# Patient Record
Sex: MALE | Employment: FULL TIME | URBAN - METROPOLITAN AREA
[De-identification: names, ages, dates, MRNs, and addresses within clinical notes are randomized per-mention and may not be internally consistent; named-entity substitution may affect disease eponyms.]

---

## 2017-01-16 DIAGNOSIS — Z76.0 ENCOUNTER FOR MEDICATION REFILL: Primary | ICD-10-CM

## 2017-01-16 RX ORDER — TRIAMCINOLONE ACETONIDE 1 MG/G
CREAM TOPICAL
Qty: 80 G | Refills: 0 | Status: SHIPPED | OUTPATIENT
Start: 2017-01-16 | End: 2021-07-30

## 2017-02-01 DIAGNOSIS — E78.00 HYPERCHOLESTEREMIA: Primary | ICD-10-CM

## 2017-02-01 DIAGNOSIS — I10 ESSENTIAL HYPERTENSION: ICD-10-CM

## 2017-02-01 DIAGNOSIS — Z80.42 FAMILY HISTORY OF PROSTATE CANCER: ICD-10-CM

## 2017-02-01 DIAGNOSIS — Z11.59 NEED FOR HEPATITIS C SCREENING TEST: ICD-10-CM

## 2017-02-01 PROCEDURE — 86803 HEPATITIS C AB TEST: CPT | Mod: 90 | Performed by: FAMILY MEDICINE

## 2017-02-01 PROCEDURE — 36415 COLL VENOUS BLD VENIPUNCTURE: CPT | Performed by: FAMILY MEDICINE

## 2017-02-01 PROCEDURE — 80061 LIPID PANEL: CPT | Mod: 90 | Performed by: FAMILY MEDICINE

## 2017-02-01 PROCEDURE — 80053 COMPREHEN METABOLIC PANEL: CPT | Mod: 90 | Performed by: FAMILY MEDICINE

## 2017-02-01 PROCEDURE — 84153 ASSAY OF PSA TOTAL: CPT | Mod: 90 | Performed by: FAMILY MEDICINE

## 2017-02-01 NOTE — Clinical Note
[facility] [address] [place]  [address]  Phone: [phone]    February 3, 2017      Aiden Valdez  [address] [facility]    [place] [address]              Dear Aiden,    I am writing to report that your included test results are within expected ranges. I do not suggest that we make any changes at this time.        Sincerely,     Jarret Tay M.D.    Results for orders placed or performed in visit on 02/01/17   HCV Antibody ([facility])   Result Value Ref Range    Hep C Virus Ab <0.1 0.0 - 0.9 s/co ratio    Narrative    Performed at:  01 - [facility] [address], [place], [place]  [id number]  : Tim Cardenas MD, Phone:  [phone]   Comp. Metabolic Panel (14) ([facility])   Result Value Ref Range    Glucose 97 65 - 99 mg/dL    Urea Nitrogen 17 8 - 27 mg/dL    Creatinine 0.94 0.76 - 1.27 mg/dL    eGFR If NonAfricn Am 86 >59 mL/min/1.73    eGFR If Africn Am 99 >59 mL/min/1.73    BUN/Creatinine Ratio 18 10 - 22    Sodium 141 134 - 144 mmol/L    Potassium 5.0 3.5 - 5.2 mmol/L    Chloride 102 96 - 106 mmol/L    Total CO2 25 18 - 28 mmol/L    Calcium 9.4 8.6 - 10.2 mg/dL    Protein Total 7.2 6.0 - 8.5 g/dL    Albumin 4.3 3.6 - 4.8 g/dL    Globulin, Total 2.9 1.5 - 4.5 g/dL    A/G Ratio 1.5 1.1 - 2.5    Bilirubin Total 0.4 0.0 - 1.2 mg/dL    Alkaline Phosphatase 54 39 - 117 IU/L    AST 19 0 - 40 IU/L    ALT 16 0 - 44 IU/L    Narrative    Performed at:  01 - [facility] [address], [place], [place]  [id number]  : Tim Cardenas MD, Phone:  [phone]   Lipid Panel ([facility])   Result Value Ref Range    Cholesterol 210 (H) 100 - 199 mg/dL    Triglycerides 91 0 - 149 mg/dL    HDL Cholesterol 50 >39 mg/dL    VLDL Cholesterol George 18 5 - 40 mg/dL    LDL Cholesterol Calculated 142 (H) 0 - 99 mg/dL    LDL/HDL Ratio 2.8 0.0 - 3.6 ratio units    Narrative    Performed at:  01 - [facility] [address], [place], [place]  [id number]  :  Tim Cardenas MD, Phone:  [phone]   PSA Serum ([facility])   Result Value Ref Range    PSA NG/ML 5.0 (H) 0.0 - 4.0 ng/mL    Narrative    Performed at:  01 - [facility] [address], [place], [place]  [id number]  : Tim Cardenas MD, Phone:  [phone]

## 2017-02-02 LAB
ALBUMIN SERPL-MCNC: 4.3 G/DL (ref 3.6–4.8)
ALBUMIN/GLOB SERPL: 1.5 {RATIO} (ref 1.1–2.5)
ALP SERPL-CCNC: 54 IU/L (ref 39–117)
ALT SERPL-CCNC: 16 IU/L (ref 0–44)
AST SERPL-CCNC: 19 IU/L (ref 0–40)
BILIRUB SERPL-MCNC: 0.4 MG/DL (ref 0–1.2)
BUN SERPL-MCNC: 17 MG/DL (ref 8–27)
BUN/CREATININE RATIO: 18 (ref 10–22)
CALCIUM SERPL-MCNC: 9.4 MG/DL (ref 8.6–10.2)
CHLORIDE SERPLBLD-SCNC: 102 MMOL/L (ref 96–106)
CHOLEST SERPL-MCNC: 210 MG/DL (ref 100–199)
CREAT SERPL-MCNC: 0.94 MG/DL (ref 0.76–1.27)
EGFR IF AFRICN AM: 99 ML/MIN/1.73
EGFR IF NONAFRICN AM: 86 ML/MIN/1.73
GLOBULIN, TOTAL: 2.9 G/DL (ref 1.5–4.5)
GLUCOSE SERPL-MCNC: 97 MG/DL (ref 65–99)
HCV AB SERPL QL IA: <0.1 S/CO RATIO (ref 0–0.9)
HDLC SERPL-MCNC: 50 MG/DL
LDL/HDL RATIO: 2.8 RATIO UNITS (ref 0–3.6)
LDLC SERPL CALC-MCNC: 142 MG/DL (ref 0–99)
POTASSIUM SERPL-SCNC: 5 MMOL/L (ref 3.5–5.2)
PROT SERPL-MCNC: 7.2 G/DL (ref 6–8.5)
PSA NG/ML: 5 NG/ML (ref 0–4)
SODIUM SERPL-SCNC: 141 MMOL/L (ref 134–144)
TOTAL CO2: 25 MMOL/L (ref 18–28)
TRIGL SERPL-MCNC: 91 MG/DL (ref 0–149)
VLDLC SERPL CALC-MCNC: 18 MG/DL (ref 5–40)

## 2017-02-03 NOTE — PROGRESS NOTES
Quick Note:    Dear Aiden,   I am writing to report that your included test results are within expected ranges. I do not suggest that we make any changes at this time.    Jarret Tay M.D.      ______

## 2017-02-06 DIAGNOSIS — E78.00 HYPERCHOLESTEREMIA: Primary | ICD-10-CM

## 2017-02-06 RX ORDER — FENOFIBRATE 145 MG/1
145 TABLET, COATED ORAL DAILY
Qty: 90 TABLET | Refills: 3 | Status: SHIPPED | OUTPATIENT
Start: 2017-02-06 | End: 2018-03-01

## 2017-02-09 ENCOUNTER — OFFICE VISIT (OUTPATIENT)
Dept: FAMILY MEDICINE | Facility: CLINIC | Age: 62
End: 2017-02-09

## 2017-02-09 VITALS
SYSTOLIC BLOOD PRESSURE: 128 MMHG | WEIGHT: 169 LBS | HEART RATE: 68 BPM | HEIGHT: 67 IN | DIASTOLIC BLOOD PRESSURE: 82 MMHG | OXYGEN SATURATION: 98 % | BODY MASS INDEX: 26.53 KG/M2

## 2017-02-09 DIAGNOSIS — Z12.11 SPECIAL SCREENING FOR MALIGNANT NEOPLASMS, COLON: ICD-10-CM

## 2017-02-09 DIAGNOSIS — I10 ESSENTIAL HYPERTENSION: ICD-10-CM

## 2017-02-09 DIAGNOSIS — C61 MALIGNANT NEOPLASM OF PROSTATE (H): ICD-10-CM

## 2017-02-09 DIAGNOSIS — Z00.00 ROUTINE GENERAL MEDICAL EXAMINATION AT A HEALTH CARE FACILITY: Primary | ICD-10-CM

## 2017-02-09 DIAGNOSIS — E78.00 HYPERCHOLESTEREMIA: ICD-10-CM

## 2017-02-09 PROCEDURE — 99396 PREV VISIT EST AGE 40-64: CPT | Performed by: FAMILY MEDICINE

## 2017-02-09 PROCEDURE — 99213 OFFICE O/P EST LOW 20 MIN: CPT | Mod: 25 | Performed by: FAMILY MEDICINE

## 2017-02-09 NOTE — MR AVS SNAPSHOT
After Visit Summary   2/9/2017    Aiden Valdez    MRN: [id number]           Patient Information     Date Of Birth          1955        Visit Information        Provider Department      2/9/2017 2:15 PM Jarret Tay MD [facility]        Today's Diagnoses     Routine general medical examination at a health care facility    -  1     Special screening for malignant neoplasms, colon         Malignant neoplasm of prostate (H)         Essential hypertension         Hypercholesteremia           Care Instructions      Preventive Health Recommendations  Male Ages 50 - 64    Yearly exam:             See your health care provider every year in order to  o   Review health changes.   o   Discuss preventive care.    o   Review your medicines if your doctor has prescribed any.     Have a cholesterol test every 5 years, or more frequently if you are at risk for high cholesterol/heart disease.     Have a diabetes test (fasting glucose) every three years. If you are at risk for diabetes, you should have this test more often.     Have a colonoscopy at age 50, or have a yearly FIT test (stool test). These exams will check for colon cancer.      Talk with your health care provider about whether or not a prostate cancer screening test (PSA) is right for you.    You should be tested each year for STDs (sexually transmitted diseases), if you re at risk.     Shots: Get a flu shot each year. Get a tetanus shot every 10 years.     Nutrition:    Eat at least 5 servings of fruits and vegetables daily.     Eat whole-grain bread, whole-wheat pasta and brown rice instead of white grains and rice.     Talk to your provider about Calcium and Vitamin D.     Lifestyle    Exercise for at least 150 minutes a week (30 minutes a day, 5 days a week). This will help you control your weight and prevent disease.     Limit alcohol to one drink per day.     No smoking.     Wear sunscreen to prevent skin cancer.     See  "your dentist every six months for an exam and cleaning.     See your eye doctor every 1 to 2 years.            Follow-ups after your visit        Additional Services     GASTROENTEROLOGY ADULT REF CONSULT ONLY       Preferred Location: [facility] [phone]  Screening colonoscopy    Please be aware that coverage of these services is subject to the terms and limitations of your health insurance plan.  Call member services at your health plan with any benefit or coverage questions.  Any procedures must be performed at a [place] facility OR coordinated by your clinic's referral office.    Please bring the following with you to your appointment:    (1) Any X-Rays, CTs or MRIs which have been performed.  Contact the facility where they were done to arrange for  prior to your scheduled appointment.    (2) List of current medications   (3) This referral request   (4) Any documents/labs given to you for this referral                  Who to contact     If you have questions or need follow up information about today's clinic visit or your schedule please contact [facility] directly at [phone].  Normal or non-critical lab and imaging results will be communicated to you by [employer], letter or phone within 4 business days after the clinic has received the results. If you do not hear from us within 7 days, please contact the clinic through [employer] or phone. If you have a critical or abnormal lab result, we will notify you by phone as soon as possible.  Submit refill requests through [facility] or call your pharmacy and they will forward the refill request to us. Please allow 3 business days for your refill to be completed.          Additional Information About Your Visit        [facility] Information     [facility] lets you send messages to your doctor, view your test results, renew your prescriptions, schedule appointments and more. To sign up, go to [email or web address] . Click on \"Log in\" on the left side " "of the screen, which will take you to the Welcome page. Then click on \"Sign up Now\" on the right side of the page.     You will be asked to enter the access code listed below, as well as some personal information. Please follow the directions to create your username and password.     Your access code is: [email or web address]  Expires: 5/10/2017  3:31 PM     Your access code will  in 90 days. If you need help or a new code, please call your [facility] or [phone].        Care EveryWhere ID     This is your Care EveryWhere ID. This could be used by other organizations to access your [place] medical records  [id number]        Your Vitals Were     Pulse Height BMI (Body Mass Index) Pulse Oximetry          68 1.695 m (5' 6.75\") 26.68 kg/m2 98%         Blood Pressure from Last 3 Encounters:   17 128/82   10/06/16 138/88   16 132/80    Weight from Last 3 Encounters:   17 76.658 kg (169 lb)   10/06/16 76.204 kg (168 lb)   16 76.749 kg (169 lb 3.2 oz)              We Performed the Following     GASTROENTEROLOGY ADULT REF CONSULT ONLY        Primary Care Provider Office Phone # Fax #    Jarret Tay [phone]       [facility] [address] [place] [address]        Thank you!     Thank you for choosing [facility]  for your care. Our goal is always to provide you with excellent care. Hearing back from our patients is one way we can continue to improve our services. Please take a few minutes to complete the written survey that you may receive in the mail after your visit with us. Thank you!             Your Updated Medication List - Protect others around you: Learn how to safely use, store and throw away your medicines at www.disposemymeds.org.          This list is accurate as of: 17  3:31 PM.  Always use your most recent med list.                   Brand Name Dispense Instructions for use    ACIDOPHILUS PROBIOTIC 100 MG Caps     " 60 capsule    Take 100 mg by mouth 2 times daily       fenofibrate 145 MG tablet     90 tablet    Take 1 tablet (145 mg) by mouth daily       FEXOFENADINE HCL PO      Take 180 mg by mouth daily       fluconazole 150 MG tablet    DIFLUCAN    10 tablet    1 tablet PO every other day       hydrocortisone 2.5 % cream          hydrocortisone 2.5 % cream    PROCTOSOL HC    28.35 g    place rectally twice daily as directed.       lisinopril 10 MG tablet    PRINIVIL/ZESTRIL    30 tablet    Take 1 tablet (10 mg) by mouth daily       triamcinolone 0.1 % cream    KENALOG    80 g    Apply sparingly to affected area three times daily as needed

## 2017-02-09 NOTE — PROGRESS NOTES
SUBJECTIVE:     CC: Aiden Valdez is an 61 year old male who presents for preventative health visit.     Healthy Habits:    Do you get at least three servings of calcium containing foods daily (dairy, green leafy vegetables, etc.)? yes    Amount of exercise or daily activities, outside of work: 6 day(s) per week    Problems taking medications regularly No    Medication side effects: Yes, muscle cramping. Fenofibrate??    Have you had an eye exam in the past two years? yes    Do you see a dentist twice per year? yes    Do you have sleep apnea, excessive snoring or daytime drowsiness?no        Questions about Fenofibrate and muscle cramping    Today's PHQ-2 Score:   PHQ-2 ( 1999 [employer]) 1/27/2016 1/6/2014   Q1: Little interest or pleasure in doing things 0 0   Q2: Feeling down, depressed or hopeless 0 0   PHQ-2 Score 0 0       Abuse: Current or Past(Physical, Sexual or Emotional)- No  Do you feel safe in your environment - Yes    Social History   Substance Use Topics     Smoking status: Never Smoker      Smokeless tobacco: Never Used     Alcohol Use: Yes     The patient does not drink >3 drinks per day nor >7 drinks per week.    Last PSA: No results found for: PSA    Recent Labs   Lab Test  02/01/17   0910  02/29/16   0751   CHOL  210*  237*   HDL  50  56   LDL  142*  161*   TRIG  91  100       Reviewed orders with patient. Reviewed health maintenance and updated orders accordingly - Yes    All Histories reviewed and updated in Epic.  Past Medical History   Diagnosis Date     AR (allergic rhinitis)      Periocular dermatitides      Hypercholesteremia      HTN (hypertension)       Past Surgical History   Procedure Laterality Date     Extracapsular cataract extration with intraocular lens implant       Prostate biopsy, needle, saturation sampling         ROS:  C: NEGATIVE for fever, chills, change in weight  I: NEGATIVE for worrisome rashes, moles or lesions  E: NEGATIVE for vision changes or irritation  ENT: NEGATIVE  for ear, mouth and throat problems  R: NEGATIVE for significant cough or SOB  CV: NEGATIVE for chest pain, palpitations or peripheral edema  GI: NEGATIVE for nausea, abdominal pain, heartburn, or change in bowel habits   male: negative for dysuria, hematuria, decreased urinary stream, erectile dysfunction, urethral discharge  M: NEGATIVE for significant arthralgias or myalgia  N: NEGATIVE for weakness, dizziness or paresthesias  P: NEGATIVE for changes in mood or affect    BP Readings from Last 3 Encounters:   02/09/17 128/82   10/06/16 138/88   01/27/16 132/80    Wt Readings from Last 3 Encounters:   02/09/17 76.658 kg (169 lb)   10/06/16 76.204 kg (168 lb)   01/27/16 76.749 kg (169 lb 3.2 oz)                  OBJECTIVE:     There were no vitals taken for this visit.  EXAM:  GENERAL: healthy, alert and no distress  EYES: Eyes grossly normal to inspection, PERRL and conjunctivae and sclerae normal  HENT: ear canals and TM's normal, nose and mouth without ulcers or lesions  NECK: no adenopathy, no asymmetry, masses, or scars and thyroid normal to palpation  RESP: lungs clear to auscultation - no rales, rhonchi or wheezes  BREAST: normal without masses, tenderness or nipple discharge and no palpable axillary masses or adenopathy  CV: regular rate and rhythm, normal S1 S2, no S3 or S4, no murmur, click or rub, no peripheral edema and peripheral pulses strong  ABDOMEN: soft, nontender, no hepatosplenomegaly, no masses and bowel sounds normal  RECTAL: normal sphincter tone, no rectal masses, prostate normal size, smooth, nontender without nodules or masses  MS: no gross musculoskeletal defects noted, no edema  SKIN: no suspicious lesions or rashes  NEURO: Normal strength and tone, mentation intact and speech normal  PSYCH: mentation appears normal, affect normal/bright    ASSESSMENT/PLAN:     Aiden was seen today for physical and hearing screening.    Diagnoses and all orders for this visit:    Routine general medical  "examination at a health care facility  -     GASTROENTEROLOGY ADULT REF CONSULT ONLY    Special screening for malignant neoplasms, colon  -     GASTROENTEROLOGY ADULT REF CONSULT ONLY    Other orders  -     Cancel: GASTROENTEROLOGY ADULT REF PROCEDURE ONLY        COUNSELING:  Reviewed preventive health counseling, as reflected in patient instructions       Regular exercise       Colon cancer screening         reports that he has never smoked. He has never used smokeless tobacco.    Estimated body mass index is 26.71 kg/(m^2) as calculated from the following:    Height as of 1/27/16: 1.689 m (5' 6.5\").    Weight as of 10/6/16: 76.204 kg (168 lb).       Counseling Resources:  ATP IV Guidelines  Pooled Cohorts Equation Calculator  FRAX Risk Assessment  ICSI Preventive Guidelines  Dietary Guidelines for Americans, 2010  USDA's MyPlate  ASA Prophylaxis  Lung CA Screening    Jarret Tay MD  [facility]  "

## 2017-02-09 NOTE — PROGRESS NOTES
Problem(s) Oriented visit    Besides the Wellness Exam he is here to discuss the status of the following problem(s)  Subjective:  1. Hypercholesteremia  Has history of hyperlipidemia.  On med for this, denies any significant side effects of this medication.      Latest labs reviewed:    Recent Labs   Lab Test  02/01/17   0910  02/29/16   0751   CHOL  210*  237*   HDL  50  56   LDL  142*  161*   TRIG  91  100        AST       19   2/1/2017     2.  Essential hypertension  Blood presure remains well controlled when checked out of clinic.    Reviewed last 6 BP readings in chart:  BP Readings from Last 6 Encounters:   02/09/17 128/82   10/06/16 138/88   01/27/16 132/80   02/17/15 140/86   02/05/15 118/66   01/22/15 142/82       he has not experienced any significant side effects from medications for hypertension.    NO active cardiac complaints or symptoms with exercise.    3. Malignant neoplasm of prostate (H)  Found on biopsy with Dr. Ray, wants us to order next MRI in approx 9 monts    ROS:  General and CV completed and negative except as noted above     HISTORY:   reports that he has never smoked. He has never used smokeless tobacco.    EXAM:  BP: 128/82   Pulse: 68    Temp: Data Unavailable    Wt Readings from Last 2 Encounters:   02/09/17 76.658 kg (169 lb)   10/06/16 76.204 kg (168 lb)       BMI= Body mass index is 26.68 kg/(m^2).    EXAM:  APPEARANCE: = Relaxed and in no distress      Assessment/Plan:  Malignant neoplasm of prostate (H)  Will check MRI in November  Essential hypertension  Discussed hypertension in detail including JNC VIII guidelines for blood pressure goals.  Discussed indication for treatment and treatment options.  Discussed the importance for aggressive management of HTN to prevent vascular complications later.  Recommended lower fat, lower carbohydrate, and lower sodium (<2000 mg)diet.  Discussed required intervals for follow up on HTN, lab studies, and the need to aggresive management of  other cardiac disease risk factors.  Recommened pt. follow their blood pressures outside the clinic to ensure that BPs are remaining within guidelines, and to contact me if the readings are not within guidelines so we can adjust treatment as needed.    Hypercholesteremia  Discussed current lipid results, previous results (if available) current guidelines (NCEP) for treatment and goals for lipids.  Discussed lifestyle modification, dietary changes (low fat, low simple carb) and regular aerobic exercise.  Discussed the link between dysmetabolic syndrome and impaired glucose tolerance seen in certain patterns of lipids.  Briefly discussed medication used for lipid lowering, including the statins are their possible side effects of myalgias, rhabdomyolysis, and liver toxicity.    Refill medicaion    Reviewed patients plan of care and provided an AVS.   Jarret Tay  [facility]  [phone]   For any issues my office # is [phone]

## 2017-03-08 DIAGNOSIS — Z76.0 ENCOUNTER FOR MEDICATION REFILL: ICD-10-CM

## 2017-03-09 RX ORDER — LISINOPRIL 10 MG/1
10 TABLET ORAL DAILY
Qty: 90 TABLET | Refills: 3 | Status: ON HOLD | OUTPATIENT
Start: 2017-03-09 | End: 2017-05-19

## 2017-05-08 ENCOUNTER — OFFICE VISIT (OUTPATIENT)
Dept: FAMILY MEDICINE | Facility: CLINIC | Age: 62
End: 2017-05-08

## 2017-05-08 VITALS
OXYGEN SATURATION: 95 % | SYSTOLIC BLOOD PRESSURE: 138 MMHG | HEART RATE: 94 BPM | BODY MASS INDEX: 26.67 KG/M2 | DIASTOLIC BLOOD PRESSURE: 88 MMHG | WEIGHT: 169 LBS

## 2017-05-08 DIAGNOSIS — T78.40XD ALLERGIC REACTION, SUBSEQUENT ENCOUNTER: ICD-10-CM

## 2017-05-08 DIAGNOSIS — J01.01 ACUTE RECURRENT MAXILLARY SINUSITIS: Primary | ICD-10-CM

## 2017-05-08 PROCEDURE — 99214 OFFICE O/P EST MOD 30 MIN: CPT | Performed by: FAMILY MEDICINE

## 2017-05-08 RX ORDER — FLUTICASONE PROPIONATE 50 MCG
1-2 SPRAY, SUSPENSION (ML) NASAL DAILY
Qty: 1 BOTTLE | Refills: 11 | Status: ON HOLD | OUTPATIENT
Start: 2017-05-08 | End: 2017-05-18

## 2017-05-08 NOTE — PROGRESS NOTES
SUBJECTIVE:  Aiden Valdez is a 61 year old male who complains of headaches for 4 days. Description of pain: dull pain, bilateral in the occipital area. Duration of individual headaches: all days, frequency daily. Associated symptoms: photophobia. Pain relief: OTC NSAID's. Precipitating factors: stress. He denies a history of recent head injury.   Prior neurological history: negative for migraine headaches, stroke, brain neoplasms, multiple sclerosis, meningitis, major head injuries, neurosurgical procedures.  Neurologic Review of Systems - no TIA or stroke-like symptoms.    Current Outpatient Prescriptions   Medication Sig Dispense Refill     Naphazoline-Pheniramine (OPCON-A OP)        lisinopril (PRINIVIL/ZESTRIL) 10 MG tablet Take 1 tablet (10 mg) by mouth daily 90 tablet 3     fenofibrate 145 MG tablet Take 1 tablet (145 mg) by mouth daily 90 tablet 3     triamcinolone (KENALOG) 0.1 % cream Apply sparingly to affected area three times daily as needed 80 g 0     FEXOFENADINE HCL PO Take 180 mg by mouth daily       hydrocortisone (PROCTOSOL HC) 2.5 % rectal cream place rectally twice daily as directed. 28.35 g 0     Lactobacillus (ACIDOPHILUS PROBIOTIC) 100 MG CAPS Take 100 mg by mouth 2 times daily 60 capsule 1     hydrocortisone 2.5 % cream          OBJECTIVE:  Appearance: healthy, alert and cooperative.  Neurological Exam: normal without focal findings, mental status, speech normal, alert and oriented x iii, TREVER, reflexes normal and symmetric.    Assessment/Plan:  Aiden was seen today for headache.    Diagnoses and all orders for this visit:    Acute recurrent maxillary sinusitis  -     amoxicillin-clavulanate (AUGMENTIN) 875-125 MG per tablet; Take 1 tablet by mouth 2 times daily  -     fluticasone (FLONASE) 50 MCG/ACT spray; Spray 1-2 sprays into both nostrils daily    Allergic reaction, subsequent encounter  -     fluticasone (FLONASE) 50 MCG/ACT spray; Spray 1-2 sprays into both nostrils  daily    Recommendations: lie in darkened room and apply cold packs prn for pain, side effect profile discussed in detail, asked to keep headache diary and patient reassured that neurodiagnostic workup not indicated from benign H&P.  [facility].Jarret Tay MD  [facility]  [phone]

## 2017-05-08 NOTE — MR AVS SNAPSHOT
"              After Visit Summary   2017    Aiden Valdez    MRN: [id number]           Patient Information     Date Of Birth          1955        Visit Information        Provider Department      2017 2:15 PM Jarret Tay MD [facility]        Today's Diagnoses     Acute recurrent maxillary sinusitis    -  1    Allergic reaction, subsequent encounter           Follow-ups after your visit        Who to contact     If you have questions or need follow up information about today's clinic visit or your schedule please contact [facility] directly at [phone].  Normal or non-critical lab and imaging results will be communicated to you by [employer], letter or phone within 4 business days after the clinic has received the results. If you do not hear from us within 7 days, please contact the clinic through [employer] or phone. If you have a critical or abnormal lab result, we will notify you by phone as soon as possible.  Submit refill requests through [employer] or call your pharmacy and they will forward the refill request to us. Please allow 3 business days for your refill to be completed.          Additional Information About Your Visit        MyChart Information     Nevro lets you send messages to your doctor, view your test results, renew your prescriptions, schedule appointments and more. To sign up, go to [email or web address] . Click on \"Log in\" on the left side of the screen, which will take you to the Welcome page. Then click on \"Sign up Now\" on the right side of the page.     You will be asked to enter the access code listed below, as well as some personal information. Please follow the directions to create your username and password.     Your access code is: [email or web address]  Expires: 5/10/2017  4:31 PM     Your access code will  in 90 days. If you need help or a new code, please call your [place] clinic or [phone].        Care EveryWhere ID     This is your " Care EveryWhere ID. This could be used by other organizations to access your [place] medical records  [id number]        Your Vitals Were     Pulse Pulse Oximetry BMI (Body Mass Index)             94 95% 26.67 kg/m2          Blood Pressure from Last 3 Encounters:   05/08/17 138/88   02/09/17 128/82   10/06/16 138/88    Weight from Last 3 Encounters:   05/08/17 76.7 kg (169 lb)   02/09/17 76.7 kg (169 lb)   10/06/16 76.2 kg (168 lb)              Today, you had the following     No orders found for display         Today's Medication Changes          These changes are accurate as of: 5/8/17  2:48 PM.  If you have any questions, ask your nurse or doctor.               Start taking these medicines.        Dose/Directions    amoxicillin-clavulanate 875-125 MG per tablet   Commonly known as:  AUGMENTIN   Used for:  Acute recurrent maxillary sinusitis   Started by:  Jarret Tay MD        Dose:  1 tablet   Take 1 tablet by mouth 2 times daily   Quantity:  20 tablet   Refills:  0       fluticasone 50 MCG/ACT spray   Commonly known as:  FLONASE   Used for:  Acute recurrent maxillary sinusitis, Allergic reaction, subsequent encounter   Started by:  Jarret Tay MD        Dose:  1-2 spray   Spray 1-2 sprays into both nostrils daily   Quantity:  1 Bottle   Refills:  11            Where to get your medicines      These medications were sent to [place] [address] [place] [facility] [address]  [address], [place] [address]     Phone:  [phone]     amoxicillin-clavulanate 875-125 MG per tablet    fluticasone 50 MCG/ACT spray                Primary Care Provider Office Phone # Fax #    Jarret Tay [phone]       [facility] [address] [place] [address]        Thank you!     Thank you for choosing [facility]  for your care. Our goal is always to provide you with excellent care. Hearing back from our patients is one way we can  continue to improve our services. Please take a few minutes to complete the written survey that you may receive in the mail after your visit with us. Thank you!             Your Updated Medication List - Protect others around you: Learn how to safely use, store and throw away your medicines at www.disposemymeds.org.          This list is accurate as of: 5/8/17  2:48 PM.  Always use your most recent med list.                   Brand Name Dispense Instructions for use    ACIDOPHILUS PROBIOTIC 100 MG Caps     60 capsule    Take 100 mg by mouth 2 times daily       amoxicillin-clavulanate 875-125 MG per tablet    AUGMENTIN    20 tablet    Take 1 tablet by mouth 2 times daily       fenofibrate 145 MG tablet     90 tablet    Take 1 tablet (145 mg) by mouth daily       FEXOFENADINE HCL PO      Take 180 mg by mouth daily       fluticasone 50 MCG/ACT spray    FLONASE    1 Bottle    Spray 1-2 sprays into both nostrils daily       hydrocortisone 2.5 % cream          hydrocortisone 2.5 % cream    PROCTOSOL HC    28.35 g    place rectally twice daily as directed.       lisinopril 10 MG tablet    PRINIVIL/ZESTRIL    90 tablet    Take 1 tablet (10 mg) by mouth daily       OPCON-A OP          triamcinolone 0.1 % cream    KENALOG    80 g    Apply sparingly to affected area three times daily as needed

## 2017-05-11 ENCOUNTER — OFFICE VISIT (OUTPATIENT)
Dept: FAMILY MEDICINE | Facility: CLINIC | Age: 62
End: 2017-05-11

## 2017-05-11 ENCOUNTER — TRANSFERRED RECORDS (OUTPATIENT)
Dept: FAMILY MEDICINE | Facility: CLINIC | Age: 62
End: 2017-05-11

## 2017-05-11 VITALS
HEART RATE: 79 BPM | OXYGEN SATURATION: 98 % | RESPIRATION RATE: 16 BRPM | BODY MASS INDEX: 26.48 KG/M2 | WEIGHT: 167.8 LBS | SYSTOLIC BLOOD PRESSURE: 136 MMHG | DIASTOLIC BLOOD PRESSURE: 82 MMHG | TEMPERATURE: 98.6 F

## 2017-05-11 DIAGNOSIS — R51.9 HEADACHE, UNSPECIFIED HEADACHE TYPE: Primary | ICD-10-CM

## 2017-05-11 DIAGNOSIS — I10 ESSENTIAL HYPERTENSION: ICD-10-CM

## 2017-05-11 PROCEDURE — 93000 ELECTROCARDIOGRAM COMPLETE: CPT | Performed by: FAMILY MEDICINE

## 2017-05-11 PROCEDURE — 99214 OFFICE O/P EST MOD 30 MIN: CPT | Performed by: FAMILY MEDICINE

## 2017-05-11 RX ORDER — OXYCODONE HYDROCHLORIDE 5 MG/1
5 TABLET ORAL EVERY 4 HOURS PRN
Qty: 18 TABLET | Refills: 0 | Status: ON HOLD | OUTPATIENT
Start: 2017-05-11 | End: 2017-05-18

## 2017-05-11 RX ORDER — METHYLPREDNISOLONE 4 MG
TABLET, DOSE PACK ORAL
Qty: 21 TABLET | Refills: 0 | Status: ON HOLD | OUTPATIENT
Start: 2017-05-11 | End: 2017-05-18

## 2017-05-11 NOTE — MR AVS SNAPSHOT
"              After Visit Summary   5/11/2017    Aiden Valdez    MRN: [id number]           Patient Information     Date Of Birth          1955        Visit Information        Provider Department      5/11/2017 1:45 PM Dutch Corey MD [facility]        Today's Diagnoses     Headache, unspecified headache type    -  1    Essential hypertension          Care Instructions      * Tension Headache    Muscle Tension Headache (also called \"stress headache\") is a very common cause of head pain. Under stress, some people tense the muscles of their shoulder, neck and scalp without knowing it. If this lasts long enough, a headache can occur. These headaches can be very painful and last for hours or even days.  Home Care:    If you were given pain medicine for this headache, do not drive yourself home. Arrange for a ride, instead. When you get home, try to sleep. You should feel much better when you wake up.    Heat to the back of your neck may relieve neck spasm.    Drink only clear liquids or eat a very light diet to avoid nausea/vomiting until symptoms improve.  Preventing Future Headaches    Identify the sources of stress in your life. These may not be obvious! Learn new ways to handle your stress, such as regular exercise, biofeedback, self-hypnosis and meditation. For more information about this, consult your doctor or go to a local bookstore and review the many books and tapes on this subject.    At the first sign of a tension headache, take time out if possible. Remove yourself from the stressful situation, find a quiet comfortable place to sit or lie down and let yourself relax. Heat and deep massage of the tight areas in the neck and shoulders may help reduce muscle spasm. Medicine, such as ibuprofen (Advil or Motrin) or a prescribed muscle relaxant may be helpful at this point.  Follow Up with your doctor if the headache is not better within the next 24 hours. If you have frequent headaches you " should discuss a treatment plan with your primary care doctor. Ask if you can have medicine to take at home the next time you get a bad headache. This may avoid the need for a visit to the emergency department in the future. Poorly controlled chronic headaches may require a referral to a neurologist (headache specialist).  Call your Doctor Or Get Prompt Medical Attention if any of the following occur:    Worsening of your head pain or no improvement within 24 hours    Repeated vomiting (unable to keep liquids down)    Fever of 100.4 F (38.0 C) or higher, or as directed by your healthcare provider    Stiff neck    Extreme drowsiness, confusion or fainting    Dizziness, vertigo (dizziness with spinning sensation)    Weakness of an arm or leg or one side of the face    Difficulty with speech or vision    2178-7682 [facility], [address] [place] [address]. All rights reserved. This information is not intended as a substitute for professional medical care. Always follow your healthcare professional's instructions.        * Migraine Headache  Migraine headaches are related to changes in blood flow to the brain. This causes throbbing or constant pain on one or both sides of the head. The pain may last from a few hours to several days. There is usually nausea, vomiting, sensitivity to light and sound, and blurred vision. A migraine attack may be triggered by emotional stress, hormone changes during the menstrual cycle, oral contraceptives, alcohol use, certain foods containing tyramine, eye strain, weather changes, missing meals, or too little or too much sleep.  Home Care For This Headache:  1) If you were given pain medicine for this headache, do not drive yourself home . Arrange for a ride, instead. When you get home, try to sleep. You should feel much better when you wake up.  2) Migraine headaches may improve with an ice pack on the forehead or at the base of the skull. Heat to the back of your neck  may relieve any neck spasm.  3) Drink only clear liquids or eat a very light diet to avoid nausea/vomiting until symptoms improve.  Preventing Future Headaches:  1) Pay attention to those factors that seem to trigger your headache. Try to avoid them when you can. If you have frequent headaches, it is useful to keep a diary of what you were doing, feeling or eating in the hours before each attack. Show this to your doctor to help find the cause of your headaches.  a) If you feel that stress is a factor in your headaches, look at the sources of stress in your life. Find ways to release the build-up of those stresses by using regular exercise, relaxation methods (yoga, meditation), bio-feedback or simply taking time-out for yourself. For more information about this, consult your doctor or go to a local bookstore and review books and tapes on this subject.  b) Tyramine is a substance present in the following foods : chocolate, yogurt, all cheeses except cottage cheese and cream cheese. smoked or pickled fish and meat (including herring, caviar, bologna, pepperoni, salami), liver, avocados, bananas, figs, raisins, and red wine. Be aware that these foods may trigger a migraine in some persons. Try taking these foods out of your diet for 1-2 months to see if this reduces headache frequency.  Treating Future Attacks:  1) At the first sign of a migraine headache, take a medicine to stop it if one has been prescribed for you. If not, take acetaminophen (Tylenol) or ibuprofen (Motrin, Advil) if you are able to take these. The sooner you take medicine, the better it will work.  2) You may also want to find a quiet, dark, comfortable place to sit or lie down. Let yourself relax or sleep.  3) An ice pack on the forehead or area of greatest pain may also help.  Follow Up  with your doctor if the headache is not better within the next 24 hours. If you have frequent headaches you should discuss a treatment plan with your primary  care doctor. Ask if you can have medicine to take at home the next time you get a bad headache. Poorly controlled chronic headaches may require a referral to a neurologist (headache specialist).  Get Prompt Medical Attention  if any of the following occur:    Your head pain gets worse, or does not improve within 24 hours    Repeated vomiting (can t keep liquids down)    Sinus or ear or throat pain (not already reported)    Fever of 101  F (38.3  C) or higher, or as directed by your healthcare provider    Stiff neck    Extreme drowsiness, confusion or fainting    Weakness of an arm or leg or one side of the face    Difficulty with speech or vision    7639-1376 [employer]. [address] [place] [address], [place] [address]. All rights reserved. This information is not intended as a substitute for professional medical care. Always follow your healthcare professional's instructions.        Self-Care for Headaches  Most headaches aren't serious and can be relieved with self-care. But some headaches may be a sign of another health problem like eye trouble or high blood pressure. To find the best treatment, learn what kind of headaches you get. For tension headaches, self-care will usually help. To treat migraines, ask your healthcare provider for advice. It is also possible to get both tension and migraine headaches. Self-care involves relieving the pain and avoiding headache  triggers  if you can.    Ways to reduce pain and tension  Try these steps:    Apply a cold compress or ice pack to the pain site.    Drink fluids. If nausea makes it hard to drink, try sucking on ice.    Rest. Protect yourself from bright light and loud noises.    Calm your emotions by imagining a peaceful scene.    Massage tight neck, shoulder, and head muscles.    To relax muscles, soak in a hot bath or use a hot shower.  Use medicines  Aspirin or aspirin substitutes, such as ibuprofen and acetaminophen, can relieve headache. Remember: Never  "give aspirin to anyone 18 years old or younger because of the risk of developing Reye syndrome. Use pain medicines only when necessary.  Track your headaches  Keeping a headache diary can help you and your healthcare provider identify what's causing your headaches:    Note when each headache happens.    Identify your activities and the foods you've eaten 6 to 8 hours before the headache began.    Look for any trends or \"triggers.\"  Signs of tension headache  Any of the following can be signs:    Dull pain or feeling of pressure in a tight band around your head    Pain in your neck or shoulders    Headache without a definite beginning or end    Headache after an activity such as driving or working on a computer  Signs of migraine  Any of the following can be signs:    Throbbing pain on one or both sides of your head    Nausea or vomiting    Extreme sensitivity to light, sound, and smells    Bright spots, flashes, or other visual changes    Pain or nausea so severe that you can't continue your daily activities  Call your healthcare provider   If you have any of the following symptoms, contact your healthcare provider:    A headache that lingers after a recent injury or bump to the head.    A fever with a stiff neck or pain when you bend your head toward your chest.    A headache along with slurred speech, changes in your vision, or numbness or weakness in your arms or legs.    A headache for longer than 3 days.    Frequent headaches, especially in the morning.    Headaches with seizures     Seek immediate medical attention if you have a headache that you would call \"the worst headache you have ever had.\"     2331-8643 [employer]. [address] [place] [address]. All rights reserved. This information is not intended as a substitute for professional medical care. Always follow your healthcare professional's instructions.              Follow-ups after your visit        Additional Services     " "[facility]       Referral to [facility]  Phone: [phone]  Fax: [phone]  Reason for referral: CT scan of brain, CT scan of sinuses .  Severe headache for 5 days                  Who to contact     If you have questions or need follow up information about today's clinic visit or your schedule please contact [facility] directly at [phone].  Normal or non-critical lab and imaging results will be communicated to you by [employer], letter or phone within 4 business days after the clinic has received the results. If you do not hear from us within 7 days, please contact the clinic through [employer] or phone. If you have a critical or abnormal lab result, we will notify you by phone as soon as possible.  Submit refill requests through [employer] or call your pharmacy and they will forward the refill request to us. Please allow 3 business days for your refill to be completed.          Additional Information About Your Visit        [employer]ly Information     [employer] lets you send messages to your doctor, view your test results, renew your prescriptions, schedule appointments and more. To sign up, go to [email or web address] . Click on \"Log in\" on the left side of the screen, which will take you to the Welcome page. Then click on \"Sign up Now\" on the right side of the page.     You will be asked to enter the access code listed below, as well as some personal information. Please follow the directions to create your username and password.     Your access code is: [email or web address]  Expires: 2017  2:14 PM     Your access code will  in 90 days. If you need help or a new code, please call your [place] clinic or [phone].        Care EveryWhere ID     This is your Care EveryWhere ID. This could be used by other organizations to access your [place] medical records  [id number]        Your Vitals Were     Pulse Temperature Respirations Pulse Oximetry BMI (Body Mass Index)       79 " 98.6  F (37  C) (Oral) 16 98% 26.48 kg/m2        Blood Pressure from Last 3 Encounters:   05/11/17 136/82   05/08/17 138/88   02/09/17 128/82    Weight from Last 3 Encounters:   05/11/17 76.1 kg (167 lb 12.8 oz)   05/08/17 76.7 kg (169 lb)   02/09/17 76.7 kg (169 lb)              We Performed the Following     EKG 12-lead complete w/read - Clinics     Referral to [facility]          Today's Medication Changes          These changes are accurate as of: 5/11/17  2:14 PM.  If you have any questions, ask your nurse or doctor.               Start taking these medicines.        Dose/Directions    methylPREDNISolone 4 MG tablet   Commonly known as:  MEDROL DOSEPAK   Used for:  Headache, unspecified headache type   Started by:  Dutch Corey MD        Follow package instructions   Quantity:  21 tablet   Refills:  0       oxyCODONE 5 MG IR tablet   Commonly known as:  ROXICODONE   Used for:  Headache, unspecified headache type   Started by:  Dutch Corey MD        Dose:  5 mg   Take 1 tablet (5 mg) by mouth every 4 hours as needed for pain Maximum 8 tablet(s) per day   Quantity:  18 tablet   Refills:  0            Where to get your medicines      Some of these will need a paper prescription and others can be bought over the counter.  Ask your nurse if you have questions.     Bring a paper prescription for each of these medications     methylPREDNISolone 4 MG tablet    oxyCODONE 5 MG IR tablet                Primary Care Provider Office Phone # Fax #    Jarret Tay [phone]       [facility] [address]  [facility] [address]        Thank you!     Thank you for choosing [facility]  for your care. Our goal is always to provide you with excellent care. Hearing back from our patients is one way we can continue to improve our services. Please take a few minutes to complete the written survey that you may receive in the mail after your visit with us. Thank  you!             Your Updated Medication List - Protect others around you: Learn how to safely use, store and throw away your medicines at www.disposemymeds.org.          This list is accurate as of: 5/11/17  2:14 PM.  Always use your most recent med list.                   Brand Name Dispense Instructions for use    ACIDOPHILUS PROBIOTIC 100 MG Caps     60 capsule    Take 100 mg by mouth 2 times daily       amoxicillin-clavulanate 875-125 MG per tablet    AUGMENTIN    20 tablet    Take 1 tablet by mouth 2 times daily       fenofibrate 145 MG tablet     90 tablet    Take 1 tablet (145 mg) by mouth daily       FEXOFENADINE HCL PO      Take 180 mg by mouth daily       fluticasone 50 MCG/ACT spray    FLONASE    1 Bottle    Spray 1-2 sprays into both nostrils daily       hydrocortisone 2.5 % cream          hydrocortisone 2.5 % cream    PROCTOSOL HC    28.35 g    place rectally twice daily as directed.       lisinopril 10 MG tablet    PRINIVIL/ZESTRIL    90 tablet    Take 1 tablet (10 mg) by mouth daily       methylPREDNISolone 4 MG tablet    MEDROL DOSEPAK    21 tablet    Follow package instructions       OPCON-A OP      Place 1-2 drops into both eyes daily       oxyCODONE 5 MG IR tablet    ROXICODONE    18 tablet    Take 1 tablet (5 mg) by mouth every 4 hours as needed for pain Maximum 8 tablet(s) per day       triamcinolone 0.1 % cream    KENALOG    80 g    Apply sparingly to affected area three times daily as needed

## 2017-05-11 NOTE — PATIENT INSTRUCTIONS
"  * Tension Headache    Muscle Tension Headache (also called \"stress headache\") is a very common cause of head pain. Under stress, some people tense the muscles of their shoulder, neck and scalp without knowing it. If this lasts long enough, a headache can occur. These headaches can be very painful and last for hours or even days.  Home Care:    If you were given pain medicine for this headache, do not drive yourself home. Arrange for a ride, instead. When you get home, try to sleep. You should feel much better when you wake up.    Heat to the back of your neck may relieve neck spasm.    Drink only clear liquids or eat a very light diet to avoid nausea/vomiting until symptoms improve.  Preventing Future Headaches    Identify the sources of stress in your life. These may not be obvious! Learn new ways to handle your stress, such as regular exercise, biofeedback, self-hypnosis and meditation. For more information about this, consult your doctor or go to a local bookstore and review the many books and tapes on this subject.    At the first sign of a tension headache, take time out if possible. Remove yourself from the stressful situation, find a quiet comfortable place to sit or lie down and let yourself relax. Heat and deep massage of the tight areas in the neck and shoulders may help reduce muscle spasm. Medicine, such as ibuprofen (Advil or Motrin) or a prescribed muscle relaxant may be helpful at this point.  Follow Up with your doctor if the headache is not better within the next 24 hours. If you have frequent headaches you should discuss a treatment plan with your primary care doctor. Ask if you can have medicine to take at home the next time you get a bad headache. This may avoid the need for a visit to the emergency department in the future. Poorly controlled chronic headaches may require a referral to a neurologist (headache specialist).  Call your Doctor Or Get Prompt Medical Attention if any of the following " occur:    Worsening of your head pain or no improvement within 24 hours    Repeated vomiting (unable to keep liquids down)    Fever of 100.4 F (38.0 C) or higher, or as directed by your healthcare provider    Stiff neck    Extreme drowsiness, confusion or fainting    Dizziness, vertigo (dizziness with spinning sensation)    Weakness of an arm or leg or one side of the face    Difficulty with speech or vision    1203-4544 [facility], [address] [place] [address], [place] [address]. All rights reserved. This information is not intended as a substitute for professional medical care. Always follow your healthcare professional's instructions.        * Migraine Headache  Migraine headaches are related to changes in blood flow to the brain. This causes throbbing or constant pain on one or both sides of the head. The pain may last from a few hours to several days. There is usually nausea, vomiting, sensitivity to light and sound, and blurred vision. A migraine attack may be triggered by emotional stress, hormone changes during the menstrual cycle, oral contraceptives, alcohol use, certain foods containing tyramine, eye strain, weather changes, missing meals, or too little or too much sleep.  Home Care For This Headache:  1) If you were given pain medicine for this headache, do not drive yourself home . Arrange for a ride, instead. When you get home, try to sleep. You should feel much better when you wake up.  2) Migraine headaches may improve with an ice pack on the forehead or at the base of the skull. Heat to the back of your neck may relieve any neck spasm.  3) Drink only clear liquids or eat a very light diet to avoid nausea/vomiting until symptoms improve.  Preventing Future Headaches:  1) Pay attention to those factors that seem to trigger your headache. Try to avoid them when you can. If you have frequent headaches, it is useful to keep a diary of what you were doing, feeling or eating in the hours before each attack.  Show this to your doctor to help find the cause of your headaches.  a) If you feel that stress is a factor in your headaches, look at the sources of stress in your life. Find ways to release the build-up of those stresses by using regular exercise, relaxation methods (yoga, meditation), bio-feedback or simply taking time-out for yourself. For more information about this, consult your doctor or go to a local bookstore and review books and tapes on this subject.  b) Tyramine is a substance present in the following foods : chocolate, yogurt, all cheeses except cottage cheese and cream cheese. smoked or pickled fish and meat (including herring, caviar, bologna, pepperoni, salami), liver, avocados, bananas, figs, raisins, and red wine. Be aware that these foods may trigger a migraine in some persons. Try taking these foods out of your diet for 1-2 months to see if this reduces headache frequency.  Treating Future Attacks:  1) At the first sign of a migraine headache, take a medicine to stop it if one has been prescribed for you. If not, take acetaminophen (Tylenol) or ibuprofen (Motrin, Advil) if you are able to take these. The sooner you take medicine, the better it will work.  2) You may also want to find a quiet, dark, comfortable place to sit or lie down. Let yourself relax or sleep.  3) An ice pack on the forehead or area of greatest pain may also help.  Follow Up  with your doctor if the headache is not better within the next 24 hours. If you have frequent headaches you should discuss a treatment plan with your primary care doctor. Ask if you can have medicine to take at home the next time you get a bad headache. Poorly controlled chronic headaches may require a referral to a neurologist (headache specialist).  Get Prompt Medical Attention  if any of the following occur:    Your head pain gets worse, or does not improve within 24 hours    Repeated vomiting (can t keep liquids down)    Sinus or ear or throat pain  (not already reported)    Fever of 101  F (38.3  C) or higher, or as directed by your healthcare provider    Stiff neck    Extreme drowsiness, confusion or fainting    Weakness of an arm or leg or one side of the face    Difficulty with speech or vision    2199-3646 [employer]. [address] [place] [id number]. All rights reserved. This information is not intended as a substitute for professional medical care. Always follow your healthcare professional's instructions.        Self-Care for Headaches  Most headaches aren't serious and can be relieved with self-care. But some headaches may be a sign of another health problem like eye trouble or high blood pressure. To find the best treatment, learn what kind of headaches you get. For tension headaches, self-care will usually help. To treat migraines, ask your healthcare provider for advice. It is also possible to get both tension and migraine headaches. Self-care involves relieving the pain and avoiding headache  triggers  if you can.    Ways to reduce pain and tension  Try these steps:    Apply a cold compress or ice pack to the pain site.    Drink fluids. If nausea makes it hard to drink, try sucking on ice.    Rest. Protect yourself from bright light and loud noises.    Calm your emotions by imagining a peaceful scene.    Massage tight neck, shoulder, and head muscles.    To relax muscles, soak in a hot bath or use a hot shower.  Use medicines  Aspirin or aspirin substitutes, such as ibuprofen and acetaminophen, can relieve headache. Remember: Never give aspirin to anyone 18 years old or younger because of the risk of developing Reye syndrome. Use pain medicines only when necessary.  Track your headaches  Keeping a headache diary can help you and your healthcare provider identify what's causing your headaches:    Note when each headache happens.    Identify your activities and the foods you've eaten 6 to 8 hours before the headache began.     "Look for any trends or \"triggers.\"  Signs of tension headache  Any of the following can be signs:    Dull pain or feeling of pressure in a tight band around your head    Pain in your neck or shoulders    Headache without a definite beginning or end    Headache after an activity such as driving or working on a computer  Signs of migraine  Any of the following can be signs:    Throbbing pain on one or both sides of your head    Nausea or vomiting    Extreme sensitivity to light, sound, and smells    Bright spots, flashes, or other visual changes    Pain or nausea so severe that you can't continue your daily activities  Call your healthcare provider   If you have any of the following symptoms, contact your healthcare provider:    A headache that lingers after a recent injury or bump to the head.    A fever with a stiff neck or pain when you bend your head toward your chest.    A headache along with slurred speech, changes in your vision, or numbness or weakness in your arms or legs.    A headache for longer than 3 days.    Frequent headaches, especially in the morning.    Headaches with seizures     Seek immediate medical attention if you have a headache that you would call \"the worst headache you have ever had.\"     0201-2066 [employer]. [address] [place] [address], [place] [address]. All rights reserved. This information is not intended as a substitute for professional medical care. Always follow your healthcare professional's instructions.        "

## 2017-05-11 NOTE — PROGRESS NOTES
Headache     Intense pounding  Started Sunday 5 days ago  Woke with it Sunday  Posterior base of skull right > left and bilateral temples  Has little black vision spots no bright lights or wavy lines  No photophobia  No nausea   aleve 2 bid little help  Doesn't feel well  And he has skipped exercise due to headache  He has not been able to work the last 2 days      Now on abx for sinus infectoin as of last Monday 4 days ago  Augmentin.  Nasal discharge none, post nasal drip none  Face pain none.  No recent URI    PMH neg for migraines     Past Surgical History:   Procedure Laterality Date     EXTRACAPSULAR CATARACT EXTRATION WITH INTRAOCULAR LENS IMPLANT       PROSTATE BIOPSY, NEEDLE, SATURATION SAMPLING       Current Outpatient Prescriptions   Medication               Naphazoline-Pheniramine (OPCON-A OP)     amoxicillin-clavulanate (AUGMENTIN) 875-125 MG per tablet     fluticasone (FLONASE) 50 MCG/ACT spray     lisinopril (PRINIVIL/ZESTRIL) 10 MG tablet     fenofibrate 145 MG tablet     triamcinolone (KENALOG) 0.1 % cream     FEXOFENADINE HCL PO     Lactobacillus (ACIDOPHILUS PROBIOTIC) 100 MG CAPS     hydrocortisone 2.5 % cream     hydrocortisone (PROCTOSOL HC) 2.5 % rectal cream     No current facility-administered medications for this visit.      Ros   No fever, no chills.  No nausea vomiting or diarrhea.  No recent rashes  No new neck pain although he does have some chronic neck stiffness due to his work.  He does not have numbness and tingling in either arms or legs      /82  Pulse 79  Temp 98.6  F (37  C) (Oral)  Resp 16  Wt 76.1 kg (167 lb 12.8 oz)  SpO2 98%  BMI 26.48 kg/m2  Looks mildly uncomfortable, no  distress  NEURO:  equal  strength, , DTR II/IV bilaterally (UE and LE), , CN intact, ambulates without difficulty.  no focal deficits noted.  ENT - nl  Eye PERRL EOMI   Nasal passages - no discharge no erythema  FACE non tender  Neck no nodes , supple , slightly poor rom in all  directions  Mouth = nl   Lungs clear   Cor reg no murmur  Ext no edema   Alert ox3     EKG REQUESTED BY PATIENT  Is sinus and normal    ASSESSMENT / PLAN  (R51) Headache, unspecified headache type  (primary encounter diagnosis)  This sounds most likely like a migraine.  Tension headache could also be severe and this nature.  Possibility of a sinus infection causing it exists although he has not responded at all to the antibiotics he is on.  Given that he's never had migraines before which is somewhat unusual for a 58-year-old man to have his first migraine I thought we should get an  a CT scan of his brain as well as a CT scan of his sinuses.  After a get those results called back then I will make a decision with him about whether he should start the prednisone or oxycodone which I have given him today in the paper prescription form.  I also gave him a sample of a for a nasal sumatriptan which he could try but not until I give him the results of his scans.    Plan: EKG 12-lead complete w/read - Clinics, Referral        to [facility], methylPREDNISolone (MEDROL        DOSEPAK) 4 MG tablet, oxyCODONE (ROXICODONE) 5         MG IR tablet         (I10) Essential hypertension        >25 minutes spent with patient, greater than 50% spent on discussion/education/planning - as outlined in assessment and plan   Manish Larson MD

## 2017-05-15 ENCOUNTER — TRANSFERRED RECORDS (OUTPATIENT)
Dept: FAMILY MEDICINE | Facility: CLINIC | Age: 62
End: 2017-05-15

## 2017-05-17 ENCOUNTER — TELEPHONE (OUTPATIENT)
Dept: FAMILY MEDICINE | Facility: CLINIC | Age: 62
End: 2017-05-17

## 2017-05-17 ENCOUNTER — HOSPITAL ENCOUNTER (INPATIENT)
Facility: CLINIC | Age: 62
LOS: 1 days | Discharge: HOME | DRG: 066 | End: 2017-05-19
Attending: EMERGENCY MEDICINE | Admitting: INTERNAL MEDICINE
Payer: COMMERCIAL

## 2017-05-17 ENCOUNTER — TRANSFERRED RECORDS (OUTPATIENT)
Dept: FAMILY MEDICINE | Facility: CLINIC | Age: 62
End: 2017-05-17

## 2017-05-17 DIAGNOSIS — I63.431 CEREBRAL INFARCTION DUE TO EMBOLISM OF RIGHT POSTERIOR CEREBRAL ARTERY (H): Primary | ICD-10-CM

## 2017-05-17 DIAGNOSIS — I63.9 CEREBROVASCULAR ACCIDENT (CVA), UNSPECIFIED MECHANISM (H): ICD-10-CM

## 2017-05-17 DIAGNOSIS — E78.2 MIXED HYPERLIPIDEMIA: ICD-10-CM

## 2017-05-17 DIAGNOSIS — R51.9 NONINTRACTABLE EPISODIC HEADACHE, UNSPECIFIED HEADACHE TYPE: ICD-10-CM

## 2017-05-17 DIAGNOSIS — E55.9 VITAMIN D DEFICIENCY: ICD-10-CM

## 2017-05-17 LAB
ALBUMIN SERPL-MCNC: 3.6 G/DL (ref 3.4–5)
ALP SERPL-CCNC: 53 U/L (ref 40–150)
ALT SERPL W P-5'-P-CCNC: 21 U/L (ref 0–70)
ANION GAP SERPL CALCULATED.3IONS-SCNC: 7 MMOL/L (ref 3–14)
APTT PPP: 28 SEC (ref 22–37)
AST SERPL W P-5'-P-CCNC: 18 U/L (ref 0–45)
BASOPHILS # BLD AUTO: 0 10E9/L (ref 0–0.2)
BASOPHILS NFR BLD AUTO: 0.3 %
BILIRUB SERPL-MCNC: 0.5 MG/DL (ref 0.2–1.3)
BUN SERPL-MCNC: 16 MG/DL (ref 7–30)
CALCIUM SERPL-MCNC: 9.2 MG/DL (ref 8.5–10.1)
CHLORIDE SERPL-SCNC: 106 MMOL/L (ref 94–109)
CO2 SERPL-SCNC: 27 MMOL/L (ref 20–32)
CREAT SERPL-MCNC: 0.79 MG/DL (ref 0.66–1.25)
DIFFERENTIAL METHOD BLD: NORMAL
EOSINOPHIL # BLD AUTO: 0.1 10E9/L (ref 0–0.7)
EOSINOPHIL NFR BLD AUTO: 1.5 %
ERYTHROCYTE [DISTWIDTH] IN BLOOD BY AUTOMATED COUNT: 13.1 % (ref 10–15)
GFR SERPL CREATININE-BSD FRML MDRD: ABNORMAL ML/MIN/1.7M2
GLUCOSE SERPL-MCNC: 118 MG/DL (ref 70–99)
HCT VFR BLD AUTO: 47.6 % (ref 40–53)
HGB BLD-MCNC: 16.7 G/DL (ref 13.3–17.7)
IMM GRANULOCYTES # BLD: 0 10E9/L (ref 0–0.4)
IMM GRANULOCYTES NFR BLD: 0.3 %
INR PPP: 1.03 (ref 0.86–1.14)
INTERPRETATION ECG - MUSE: NORMAL
LYMPHOCYTES # BLD AUTO: 1.4 10E9/L (ref 0.8–5.3)
LYMPHOCYTES NFR BLD AUTO: 19.3 %
MCH RBC QN AUTO: 32.2 PG (ref 26.5–33)
MCHC RBC AUTO-ENTMCNC: 35.1 G/DL (ref 31.5–36.5)
MCV RBC AUTO: 92 FL (ref 78–100)
MONOCYTES # BLD AUTO: 0.7 10E9/L (ref 0–1.3)
MONOCYTES NFR BLD AUTO: 10.1 %
NEUTROPHILS # BLD AUTO: 4.9 10E9/L (ref 1.6–8.3)
NEUTROPHILS NFR BLD AUTO: 68.5 %
NRBC # BLD AUTO: 0 10*3/UL
NRBC BLD AUTO-RTO: 0 /100
PLATELET # BLD AUTO: 295 10E9/L (ref 150–450)
POTASSIUM SERPL-SCNC: 3.8 MMOL/L (ref 3.4–5.3)
PROT SERPL-MCNC: 7.6 G/DL (ref 6.8–8.8)
RBC # BLD AUTO: 5.19 10E12/L (ref 4.4–5.9)
SODIUM SERPL-SCNC: 140 MMOL/L (ref 133–144)
TROPONIN I SERPL-MCNC: NORMAL UG/L (ref 0–0.04)
WBC # BLD AUTO: 7.1 10E9/L (ref 4–11)

## 2017-05-17 PROCEDURE — 85025 COMPLETE CBC W/AUTO DIFF WBC: CPT | Performed by: EMERGENCY MEDICINE

## 2017-05-17 PROCEDURE — 99285 EMERGENCY DEPT VISIT HI MDM: CPT

## 2017-05-17 PROCEDURE — 80053 COMPREHEN METABOLIC PANEL: CPT | Performed by: EMERGENCY MEDICINE

## 2017-05-17 PROCEDURE — 85610 PROTHROMBIN TIME: CPT | Performed by: EMERGENCY MEDICINE

## 2017-05-17 PROCEDURE — 25000128 H RX IP 250 OP 636: Performed by: EMERGENCY MEDICINE

## 2017-05-17 PROCEDURE — 85730 THROMBOPLASTIN TIME PARTIAL: CPT | Performed by: EMERGENCY MEDICINE

## 2017-05-17 PROCEDURE — 84484 ASSAY OF TROPONIN QUANT: CPT | Performed by: EMERGENCY MEDICINE

## 2017-05-17 PROCEDURE — 93005 ELECTROCARDIOGRAM TRACING: CPT

## 2017-05-17 RX ORDER — IOPAMIDOL 755 MG/ML
70 INJECTION, SOLUTION INTRAVASCULAR ONCE
Status: COMPLETED | OUTPATIENT
Start: 2017-05-17 | End: 2017-05-17

## 2017-05-17 RX ORDER — LIDOCAINE 40 MG/G
CREAM TOPICAL
Status: DISCONTINUED | OUTPATIENT
Start: 2017-05-17 | End: 2017-05-18

## 2017-05-17 RX ADMIN — IOPAMIDOL 70 ML: 755 INJECTION, SOLUTION INTRAVENOUS at 23:57

## 2017-05-17 ASSESSMENT — ENCOUNTER SYMPTOMS
NUMBNESS: 0
PALPITATIONS: 0
WEAKNESS: 0
DIZZINESS: 1
CONFUSION: 0
HEADACHES: 1
BACK PAIN: 1

## 2017-05-17 NOTE — IP AVS SNAPSHOT
[address] [facility]    [facility] [address]    [place] [address]    Phone:  [phone]                                       After Visit Summary   5/17/2017    Aiden Valdez    MRN: [id number]           After Visit Summary Signature Page     I have received my discharge instructions, and my questions have been answered. I have discussed any challenges I see with this plan with the nurse or doctor.    ..........................................................................................................................................  Patient/Patient Representative Signature      ..........................................................................................................................................  Patient Representative Print Name and Relationship to Patient    ..................................................               ................................................  Date                                            Time    ..........................................................................................................................................  Reviewed by Signature/Title    ...................................................              ..............................................  Date                                                            Time

## 2017-05-18 ENCOUNTER — APPOINTMENT (OUTPATIENT)
Dept: OCCUPATIONAL THERAPY | Facility: CLINIC | Age: 62
DRG: 066 | End: 2017-05-18
Attending: INTERNAL MEDICINE
Payer: COMMERCIAL

## 2017-05-18 ENCOUNTER — APPOINTMENT (OUTPATIENT)
Dept: CT IMAGING | Facility: CLINIC | Age: 62
DRG: 066 | End: 2017-05-18
Attending: EMERGENCY MEDICINE
Payer: COMMERCIAL

## 2017-05-18 PROBLEM — I63.9 STROKE (CEREBRUM) (H): Status: ACTIVE | Noted: 2017-05-18

## 2017-05-18 LAB
CHOLEST SERPL-MCNC: 222 MG/DL
CRP SERPL-MCNC: <2.9 MG/L (ref 0–8)
DEPRECATED CALCIDIOL+CALCIFEROL SERPL-MC: 19 UG/L (ref 20–75)
ERYTHROCYTE [SEDIMENTATION RATE] IN BLOOD BY WESTERGREN METHOD: 5 MM/H (ref 0–20)
HBA1C MFR BLD: 5.7 % (ref 4.3–6)
HDLC SERPL-MCNC: 51 MG/DL
LDLC SERPL CALC-MCNC: 141 MG/DL
NONHDLC SERPL-MCNC: 171 MG/DL
TRIGL SERPL-MCNC: 151 MG/DL
TSH SERPL DL<=0.005 MIU/L-ACNC: 1.48 MU/L (ref 0.4–4)
VIT B12 SERPL-MCNC: 364 PG/ML (ref 193–986)

## 2017-05-18 PROCEDURE — 70450 CT HEAD/BRAIN W/O DYE: CPT

## 2017-05-18 PROCEDURE — 96375 TX/PRO/DX INJ NEW DRUG ADDON: CPT

## 2017-05-18 PROCEDURE — 86140 C-REACTIVE PROTEIN: CPT | Performed by: INTERNAL MEDICINE

## 2017-05-18 PROCEDURE — 86141 C-REACTIVE PROTEIN HS: CPT | Performed by: PSYCHIATRY & NEUROLOGY

## 2017-05-18 PROCEDURE — 25000128 H RX IP 250 OP 636: Performed by: INTERNAL MEDICINE

## 2017-05-18 PROCEDURE — 86141 C-REACTIVE PROTEIN HS: CPT | Performed by: INTERNAL MEDICINE

## 2017-05-18 PROCEDURE — G0378 HOSPITAL OBSERVATION PER HR: HCPCS

## 2017-05-18 PROCEDURE — 36415 COLL VENOUS BLD VENIPUNCTURE: CPT | Performed by: INTERNAL MEDICINE

## 2017-05-18 PROCEDURE — 40000133 ZZH STATISTIC OT WARD VISIT

## 2017-05-18 PROCEDURE — 82607 VITAMIN B-12: CPT | Performed by: INTERNAL MEDICINE

## 2017-05-18 PROCEDURE — 25000132 ZZH RX MED GY IP 250 OP 250 PS 637: Performed by: INTERNAL MEDICINE

## 2017-05-18 PROCEDURE — 97165 OT EVAL LOW COMPLEX 30 MIN: CPT | Mod: GO

## 2017-05-18 PROCEDURE — 99223 1ST HOSP IP/OBS HIGH 75: CPT | Performed by: INTERNAL MEDICINE

## 2017-05-18 PROCEDURE — 25000132 ZZH RX MED GY IP 250 OP 250 PS 637: Performed by: EMERGENCY MEDICINE

## 2017-05-18 PROCEDURE — 70498 CT ANGIOGRAPHY NECK: CPT

## 2017-05-18 PROCEDURE — 25000128 H RX IP 250 OP 636: Performed by: EMERGENCY MEDICINE

## 2017-05-18 PROCEDURE — 96374 THER/PROPH/DIAG INJ IV PUSH: CPT

## 2017-05-18 PROCEDURE — 12000000 ZZH R&B MED SURG/OB

## 2017-05-18 PROCEDURE — 84443 ASSAY THYROID STIM HORMONE: CPT | Performed by: INTERNAL MEDICINE

## 2017-05-18 PROCEDURE — 99207 ZZC CDG-CODE CATEGORY CHANGED: CPT | Performed by: INTERNAL MEDICINE

## 2017-05-18 PROCEDURE — 83036 HEMOGLOBIN GLYCOSYLATED A1C: CPT | Performed by: INTERNAL MEDICINE

## 2017-05-18 PROCEDURE — 99207 ZZC NO CHARGE VISIT/PATIENT NOT SEEN: CPT | Performed by: INTERNAL MEDICINE

## 2017-05-18 PROCEDURE — 25000125 ZZHC RX 250: Performed by: EMERGENCY MEDICINE

## 2017-05-18 PROCEDURE — 82306 VITAMIN D 25 HYDROXY: CPT | Performed by: INTERNAL MEDICINE

## 2017-05-18 PROCEDURE — 80061 LIPID PANEL: CPT | Performed by: INTERNAL MEDICINE

## 2017-05-18 PROCEDURE — 85652 RBC SED RATE AUTOMATED: CPT | Performed by: INTERNAL MEDICINE

## 2017-05-18 RX ORDER — POTASSIUM CHLORIDE 7.45 MG/ML
10 INJECTION INTRAVENOUS
Status: DISCONTINUED | OUTPATIENT
Start: 2017-05-18 | End: 2017-05-19 | Stop reason: HOSPADM

## 2017-05-18 RX ORDER — DIPHENHYDRAMINE HYDROCHLORIDE 50 MG/ML
25 INJECTION INTRAMUSCULAR; INTRAVENOUS ONCE
Status: COMPLETED | OUTPATIENT
Start: 2017-05-18 | End: 2017-05-18

## 2017-05-18 RX ORDER — OXYCODONE HYDROCHLORIDE 5 MG/1
5 TABLET ORAL
Status: DISCONTINUED | OUTPATIENT
Start: 2017-05-18 | End: 2017-05-19 | Stop reason: HOSPADM

## 2017-05-18 RX ORDER — HYDROMORPHONE HYDROCHLORIDE 1 MG/ML
.3-.5 INJECTION, SOLUTION INTRAMUSCULAR; INTRAVENOUS; SUBCUTANEOUS
Status: DISCONTINUED | OUTPATIENT
Start: 2017-05-18 | End: 2017-05-19 | Stop reason: HOSPADM

## 2017-05-18 RX ORDER — PRAVASTATIN SODIUM 20 MG
20 TABLET ORAL AT BEDTIME
Status: DISCONTINUED | OUTPATIENT
Start: 2017-05-18 | End: 2017-05-19

## 2017-05-18 RX ORDER — DIPHENHYDRAMINE HYDROCHLORIDE 50 MG/ML
INJECTION INTRAMUSCULAR; INTRAVENOUS
Status: DISCONTINUED
Start: 2017-05-18 | End: 2017-05-18 | Stop reason: HOSPADM

## 2017-05-18 RX ORDER — POTASSIUM CHLORIDE 29.8 MG/ML
20 INJECTION INTRAVENOUS
Status: DISCONTINUED | OUTPATIENT
Start: 2017-05-18 | End: 2017-05-19 | Stop reason: HOSPADM

## 2017-05-18 RX ORDER — POTASSIUM CL/LIDO/0.9 % NACL 10MEQ/0.1L
10 INTRAVENOUS SOLUTION, PIGGYBACK (ML) INTRAVENOUS
Status: DISCONTINUED | OUTPATIENT
Start: 2017-05-18 | End: 2017-05-19 | Stop reason: HOSPADM

## 2017-05-18 RX ORDER — PRAVASTATIN SODIUM 20 MG
20 TABLET ORAL DAILY
Status: DISCONTINUED | OUTPATIENT
Start: 2017-05-18 | End: 2017-05-18

## 2017-05-18 RX ORDER — ASPIRIN 300 MG/1
300 SUPPOSITORY RECTAL DAILY
Status: DISCONTINUED | OUTPATIENT
Start: 2017-05-18 | End: 2017-05-19 | Stop reason: HOSPADM

## 2017-05-18 RX ORDER — POTASSIUM CHLORIDE 1.5 G/1.58G
20-40 POWDER, FOR SOLUTION ORAL
Status: DISCONTINUED | OUTPATIENT
Start: 2017-05-18 | End: 2017-05-19 | Stop reason: HOSPADM

## 2017-05-18 RX ORDER — POTASSIUM CHLORIDE 1500 MG/1
20-40 TABLET, EXTENDED RELEASE ORAL
Status: DISCONTINUED | OUTPATIENT
Start: 2017-05-18 | End: 2017-05-19 | Stop reason: HOSPADM

## 2017-05-18 RX ORDER — ACETAMINOPHEN 325 MG/1
650 TABLET ORAL EVERY 4 HOURS PRN
Status: DISCONTINUED | OUTPATIENT
Start: 2017-05-18 | End: 2017-05-19 | Stop reason: HOSPADM

## 2017-05-18 RX ORDER — NALOXONE HYDROCHLORIDE 0.4 MG/ML
.1-.4 INJECTION, SOLUTION INTRAMUSCULAR; INTRAVENOUS; SUBCUTANEOUS
Status: DISCONTINUED | OUTPATIENT
Start: 2017-05-18 | End: 2017-05-19 | Stop reason: HOSPADM

## 2017-05-18 RX ORDER — ACETAMINOPHEN 650 MG/1
650 SUPPOSITORY RECTAL EVERY 4 HOURS PRN
Status: DISCONTINUED | OUTPATIENT
Start: 2017-05-18 | End: 2017-05-19 | Stop reason: HOSPADM

## 2017-05-18 RX ORDER — LABETALOL HYDROCHLORIDE 5 MG/ML
10 INJECTION, SOLUTION INTRAVENOUS
Status: DISCONTINUED | OUTPATIENT
Start: 2017-05-18 | End: 2017-05-19 | Stop reason: HOSPADM

## 2017-05-18 RX ORDER — ASPIRIN 325 MG
325 TABLET ORAL ONCE
Status: COMPLETED | OUTPATIENT
Start: 2017-05-18 | End: 2017-05-18

## 2017-05-18 RX ORDER — METOCLOPRAMIDE HYDROCHLORIDE 5 MG/ML
10 INJECTION INTRAMUSCULAR; INTRAVENOUS ONCE
Status: COMPLETED | OUTPATIENT
Start: 2017-05-18 | End: 2017-05-18

## 2017-05-18 RX ORDER — SODIUM CHLORIDE 9 MG/ML
INJECTION, SOLUTION INTRAVENOUS CONTINUOUS
Status: DISCONTINUED | OUTPATIENT
Start: 2017-05-18 | End: 2017-05-19 | Stop reason: HOSPADM

## 2017-05-18 RX ORDER — LISINOPRIL 40 MG/1
40 TABLET ORAL DAILY
Status: DISCONTINUED | OUTPATIENT
Start: 2017-05-18 | End: 2017-05-19 | Stop reason: HOSPADM

## 2017-05-18 RX ADMIN — ACETAMINOPHEN 650 MG: 325 TABLET, FILM COATED ORAL at 15:11

## 2017-05-18 RX ADMIN — PRAVASTATIN SODIUM 20 MG: 20 TABLET ORAL at 19:32

## 2017-05-18 RX ADMIN — SODIUM CHLORIDE: 9 INJECTION, SOLUTION INTRAVENOUS at 03:00

## 2017-05-18 RX ADMIN — SODIUM CHLORIDE: 9 INJECTION, SOLUTION INTRAVENOUS at 15:12

## 2017-05-18 RX ADMIN — SODIUM CHLORIDE 100 ML: 9 INJECTION, SOLUTION INTRAVENOUS at 00:54

## 2017-05-18 RX ADMIN — ASPIRIN 325 MG: 325 TABLET, DELAYED RELEASE ORAL at 04:47

## 2017-05-18 RX ADMIN — METOCLOPRAMIDE 10 MG: 5 INJECTION, SOLUTION INTRAMUSCULAR; INTRAVENOUS at 00:41

## 2017-05-18 RX ADMIN — ASPIRIN 325 MG ORAL TABLET 325 MG: 325 PILL ORAL at 02:20

## 2017-05-18 RX ADMIN — ACETAMINOPHEN 650 MG: 325 TABLET, FILM COATED ORAL at 09:51

## 2017-05-18 RX ADMIN — ACETAMINOPHEN 650 MG: 325 TABLET, FILM COATED ORAL at 19:32

## 2017-05-18 RX ADMIN — DIPHENHYDRAMINE HYDROCHLORIDE 25 MG: 50 INJECTION, SOLUTION INTRAMUSCULAR; INTRAVENOUS at 00:36

## 2017-05-18 RX ADMIN — LISINOPRIL 40 MG: 40 TABLET ORAL at 09:47

## 2017-05-18 ASSESSMENT — VISUAL ACUITY
OU: BASELINE;GLASSES
OU: GLASSES;BLURRED VISION
OU: BASELINE;GLASSES

## 2017-05-18 ASSESSMENT — ACTIVITIES OF DAILY LIVING (ADL): PREVIOUS_RESPONSIBILITIES: MEAL PREP;HOUSEKEEPING;LAUNDRY;MEDICATION MANAGEMENT;FINANCES;DRIVING;WORK

## 2017-05-18 NOTE — PLAN OF CARE
Problem: Goal Outcome Summary  Goal: Goal Outcome Summary  PT - Pt has no PT needs per conversation with OT. Will complete PT order.

## 2017-05-18 NOTE — CONSULTS
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   #. DVT Prophylaxis  --Mechanical, Patient is able to ambulate  #. PT/OT/Speech  --Start evaluations  #. Nutrition / GI Prophylaxis  --Per recommendations of speech therapy      #. Code Status: Full Code    ----------------------------------------------------------------------------------  ----------------------------------------------------------------------------------  Reason for consult: I was asked by Dr. Gonzalez to evaluate this patient for stroke.    Chief Complaint   ______________________________________________  Carlos  History is obtained from the patient    History of Present Illness   ______________________________________________  61-year-old  gentleman with a past medical history notable for hypertension, dyslipidemia, prostate cancer, allergic rhinitis, who had been in his usual state of health until approximately 10 days ago when he started having an occipital headache, pulsating in nature, 6-8/10 in severity, with radiation to the temples, and blurry vision primarily on the left. He saw his primary care physician  on 05/08/2017 when his symptoms were attributed to a sinus infection, and therefore he was prescribed an antibiotic. Due to persistent symptoms, he was evaluated on 05/11 when CT scan of the head was obtained which, according to the patient, was essentially unremarkable. His decreased vision prompted the patient to see an ophthalmologist on 05/15, when was noted to have some visual field defects, and therefore an MRI was arranged for 05/17 at [facility] which, according to the preliminary report, showed two small left temporo-occipital recent strokes. He therefore received a call from his primary care physician and was referred to the Emergency Department at [facility] for further management. The patient has no previous history of strokes.       In the ER, the patient has been evaluated by Dr. Silva, the ER attending physician. He had essentially unremarkable chemistry and hematology profiles. Troponin I is less than 0.015. The electrocardiogram reveals normal sinus rhythm with a rate of 72 beats per minute.       CT head with contrast, and CT angiography of the head and neck showed high-grade right posterior cerebral artery stenosis, and a 1 cm right parietal stroke, per preliminary report. The case has been communicated to Dr. Bonilla, the on-call neurologist. He is being admitted to the hospital under observation status.           Overnight, still complains on left sided blurred vision. Mild headache. No weakness or numbness  Past Medical History    ______________________________________________  Past Medical History:   Diagnosis Date     AR (allergic rhinitis)      HTN (hypertension)      Hypercholesteremia      Periocular dermatitides      Past Surgical History   ______________________________________________  Past Surgical History:   Procedure Laterality Date     EXTRACAPSULAR CATARACT EXTRATION WITH INTRAOCULAR LENS IMPLANT       PROSTATE BIOPSY, NEEDLE, SATURATION SAMPLING       Prior  to Admission Medications   ______________________________________________  Prior to Admission Medications   Prescriptions Last Dose Informant Patient Reported? Taking?   FEXOFENADINE HCL PO  Self Yes Yes   Sig: Take 180 mg by mouth daily   Lactobacillus (PROBIOTIC ACIDOPHILUS PO)  Self Yes Yes   Sig: Take 100 mg by mouth 2 times daily as needed   Naphazoline-Pheniramine (OPCON-A OP)  Self Yes Yes   Sig: Place 1-2 drops into both eyes daily as needed (allergies)    fenofibrate 145 MG tablet  at am Self No Yes   Sig: Take 1 tablet (145 mg) by mouth daily   hydrocortisone (PROCTOSOL HC) 2.5 % rectal cream  Self No Yes   Sig: place rectally twice daily as directed.   lisinopril (PRINIVIL/ZESTRIL) 10 MG tablet  at am Self No Yes   Sig: Take 1 tablet (10 mg) by mouth daily   triamcinolone (KENALOG) 0.1 % cream  Self No Yes   Sig: Apply sparingly to affected area three times daily as needed      Facility-Administered Medications: None     Allergies   Allergies   Allergen Reactions     Crestor [Rosuvastatin] Other (See Comments)     Muscle ache     Simvastatin Other (See Comments)     Muscle ache     Atorvastatin Other (See Comments)     Muscles ache       Social History   ______________________________________________  Social History     Social History     Marital status:      Spouse name: Claudia is girlfriend     Number of children: N/A     Years of education: N/A     Occupational History     accounting [employer]     Social History Main Topics     Smoking status: Never Smoker     Smokeless tobacco: Never Used     Alcohol use Yes     Drug use: No     Sexual activity: Yes     Partners: Female     Other Topics Concern     Not on file     Social History Narrative       Family History   ______________________________________________  Family History   Problem Relation Age of Onset     C.A.D. Mother      CEREBROVASCULAR DISEASE Mother      Hypertension Mother      Respiratory Father      Prostate Cancer Brother   "        Review of Systems   ______________________________________________  A comprehensive review of  10 systems was performed and found to be negative except as described in this note  CONSTITUTIONAL: negative for fever, chills, change in weight  INTEGUMENTARY/SKIN: no rash or obvious new lesions  ENT/MOUTH: no sore throat, new sinus pain or nasal drainage, no neck mass noted  RESP: No pleuretic pain, No cough, no hemoptysis, No SOB   CV: negative for chest pain, palpitations or peripheral edema  GI: no nausea, vomiting, change in stools  : no dysuria or hematuria  MUSCULOSKELETAL: no myalgias, arthralgias or join efffusion  ENDOCRINE: no history of polyuria, polydyspsia or symptoms of thyroid dysfunction  PSYCHIATRIC: no change in mood stable  LYMPHATIC: no new lymphadenopathy  HEME: no bleeding or easy bruisability  NEURO: see HPI    Physical Exam   ______________________________________________  Weight:169 lbs 0 oz; Height:5' 7\"  Temp: 97.7  F (36.5  C) Temp src: Oral BP: (!) 138/95   Heart Rate: 63 Resp: 16 SpO2: 96 % O2 Device: None (Room air)    General Appearance:  No acute distress  Neuro:       Mental Status Exam:   Awake, alert, oriented X3. Speech and language are intact. Mental status is normal       Cranial Nerves:  Pupils 3 mm, reactive. EOMI. Face sensation is normal. Face is symmetric.Tongue and uvula are midline. Other CN are normal           Motor:  5/5 X 4. Tone and bulk are normal           Reflexes:  Normal DTR.Toes downgoing.        Sensory:  Normal to PP, LT, vibration and MIYA                   Coordination:   Intact finger-to-nose and toe-to-finger tests       Gait:  No significant difficulties  Neck: no nuchal rigidity, normal thyroid. No carotid bruits.    Cardiovascular: Regular rate and rhythm, no m/r/g  Lungs: Clear to auscultation  Abdomen: Soft, not tender, not distended  Extremities: No clubbing, no cyanosis, no edema    Data   ______________________________________________  All " Data personally reviewed:       Labs:   CBC RESULTS:     Recent Labs  Lab 05/17/17 2225   WBC 7.1   RBC 5.19   HGB 16.7   HCT 47.6        Basic Metabolic Panel:   Recent Labs   Lab Test  05/17/17 2225 02/01/17   0910  01/14/16   0836   NA  140  141  142   POTASSIUM  3.8  5.0  5.2   CHLORIDE  106  102  102   CO2  27   --    --    BUN  16  17  18  15  22   CR  0.79  0.94  0.69*   GLC  118*  97  88   LATRICIA  9.2  9.4  9.7     Liver panel:  Recent Labs   Lab Test  05/17/17 2225 02/01/17   0910  01/14/16   0836  12/29/14   0818  12/30/13   0933   PROTTOTAL  7.6  7.2  7.1  7.1  6.6   ALBUMIN  3.6  4.3  4.4  4.4  3.9   BILITOTAL  0.5  0.4  0.6  0.6  0.4   ALKPHOS  53  54  60  69  68   AST  18  19  17  17  26   ALT  21  16  12  23  25     INR:  Recent Labs   Lab Test  05/17/17 2225   INR  1.03      Lipid Profile:  Recent Labs   Lab Test  05/18/17   0820  02/01/17   0910  02/29/16   0751  01/14/16   0836  12/29/14   0818   CHOL  222*  210*  237*  282*  209*   HDL  51  50  56  44  48   LDL  141*  142*  161*  210*  129*   TRIG  151*  91  100  142  160*     Thyroid Panel:  Recent Labs   Lab Test  05/18/17   0820   TSH  1.48      Vitamin B12: No lab results found.   Vitamin D level: No lab results found.  A1C:   Recent Labs   Lab Test  05/18/17   0820   A1C  5.7     Troponin I:   Recent Labs   Lab Test  05/17/17 2225   TROPI  <0.015  The 99th percentile for upper reference range is 0.045 ug/L.  Troponin values in   the range of 0.045 - 0.120 ug/L may be associated with risks of adverse   clinical events.       Ammonia: No lab results found.  CK: No lab results found.     CRP inflammation:   Recent Labs   Lab Test  05/18/17 0820   CRP  <2.9     ESR: No lab results found.    RHEA: No lab results found.    ANCA: No lab results found.   Drug Screen: No lab results found.  Alcohol level:No lab results found.  UA Results:  No lab results found.  Most Recent 6 Bacteria Isolates From Any Culture (See EPIC Reports for  Culture Details):No lab results found.     Cardiac US:   --           Imaging:   All imaging studies were reviewed personally  CT head:   Tiny focus of hypodensity at the right temporooccipital  junction that could represent a tiny recent infarct. Brain MRI may be  helpful for further evaluation. Diffuse cerebral volume loss and  cerebral white matter changes consistent with chronic small vessel  ischemic disease.   CTA neck/head:  1. Focus of moderate narrowing of the P1 segment of the right  posterior cerebral artery. This may be atherosclerotic in nature.  2. Severe stenosis/subtotal occlusion of the mid P2 segment of the  right posterior cerebral artery that may represent atherosclerotic  narrowing but could also represent thrombotic/embolic narrowing. There  is flow into the two large branches of the right posterior cerebral  artery.  3. Mild atherosclerotic narrowing of the supraclinoid distal internal  carotid arteries on both sides.  4. Calcified atherosclerotic plaque without significant narrowing at  the origins of the vertebral and internal carotid arteries  bilaterally.  5. Otherwise, normal neck and head CTA.  MRI brain ([facility] 5/17/17):   1. Multiple small subacute infarcts are seen in the right PCA distribution. No hemorrhage or global mass effect at this time.   2. Conclusion are slowed flow along the P2 segment of the right posterior cerebral artery.   3. Small foci of presumed chronic infarction are noted elsewhere in the cerebral white matter.   MRI Orbits ([facility]):   1. No orbital abnormality identified.   2. Slight ethmoid sinus inflammatory change.

## 2017-05-18 NOTE — PLAN OF CARE
Problem: Goal Outcome Summary  Goal: Goal Outcome Summary  SLP: Orders received and chart reviewed. Patient admitted under observation for right PCA strokes. Patient passed the swallow screen and has been tolerating a regular diet with thin liquids. Met with patient he is currently NPO for a HUYEN this afternoon. His speech and language skills are intact during a bedside conversation. He complains of visual difficulties only. No skilled intervention is needed at this time. Agree with OT's recommendation for OP OT for vision rehab. Will complete the orders. Recommend: 1. Continue on a regular diet with thin liquids. 2. Discharge to home with his wife when stable.

## 2017-05-18 NOTE — PHARMACY-ADMISSION MEDICATION HISTORY
Admission medication history interview status for the 5/17/2017  admission is complete. See EPIC admission navigator for prior to admission medications     Medication history source reliability:Moderate    Actions taken by pharmacist (provider contacted, etc):called [facility], his only pharmacy, to confirm what he is on for cholesterol/lipids. He is NOT on any statins, and is only on fenofibrate for that purpose.     Additional medication history information not noted on PTA med list :None    Medication reconciliation/reorder completed by provider prior to medication history? Yes    Time spent in this activity: 30 minutes    Prior to Admission medications    Medication Sig Last Dose Taking? Auth Provider   Lactobacillus (PROBIOTIC ACIDOPHILUS PO) Take 100 mg by mouth 2 times daily as needed  Yes Unknown, Entered By History   Naphazoline-Pheniramine (OPCON-A OP) Place 1-2 drops into both eyes daily as needed (allergies)   Yes Reported, Patient   lisinopril (PRINIVIL/ZESTRIL) 10 MG tablet Take 1 tablet (10 mg) by mouth daily  at am Yes Jarret Tay MD   fenofibrate 145 MG tablet Take 1 tablet (145 mg) by mouth daily  at am Yes Jarret Tay MD   triamcinolone (KENALOG) 0.1 % cream Apply sparingly to affected area three times daily as needed  Yes Jarret Tay MD   FEXOFENADINE HCL PO Take 180 mg by mouth daily  Yes Reported, Patient   hydrocortisone (PROCTOSOL HC) 2.5 % rectal cream place rectally twice daily as directed.  Yes Dutch Orantes MD

## 2017-05-18 NOTE — PLAN OF CARE
Problem: Goal Outcome Summary  Goal: Goal Outcome Summary  Outcome: Improving  Patient alert x 4, mild blurry vision, mild HA and received tylenol, up independent, patient will have HUYEN tomorrow and NPO after midnight.

## 2017-05-18 NOTE — PLAN OF CARE
Problem: Goal Outcome Summary  Goal: Goal Outcome Summary  OT: Order received, chart reviewed. Lenoreal completed. Pt admitted under observation for infarcts of the L temporo-occipital lobe. Prior to admit, pt lives alone in apt and reports I with ADL/IADLs, including driving and full-time work as an . Today, pt demonstrated I with ADLs and functional transfers despite blurred vision (primarily in L eye per pt). Provided pt with OP resource and pt agreeable to vision rehab. Recommend discharge home with increased A from significant other for IADLs, heather transportation, and follow up with OP OT for vision rehab.

## 2017-05-18 NOTE — PROGRESS NOTES
AM admission  Chart, vitals, labs and medications reviewed  Neurology consult and input appreciated    Plan: Continue current management            Hx of allergy to statins. Pravastatin was started. Benefit out weight risk. Will continue the same for now and will monitor for any reaction             Will follow    Cruz Vargas MD  Internal medicine Hospitalist:   Pager [phone]    5/18/2017

## 2017-05-18 NOTE — ED PROVIDER NOTES
History     Chief Complaint:  Headache and Dizziness    HPI   Aiden Valdez is a 61 year old male who presents with headaches and dizziness. One week ago the patient went to his PCP for left field visual blurriness and a diffuse headache. There he was prescribed antibiotics to treat a possible sinusitis. With worsening headaches, left-sided worse than right, he went back to his PCP where a CT head imaging study was performed. He was prescribed Medrol Dosepak in addition to his antibiotics; he took his first dose five days ago. The combination of steroids and antibiotics resulted in dizziness and an abnormal gait. Due to this symptomology, Aiden discontinued his medications. Two days ago Aiden visited his ophthalmologist who obtained visual field test, indicating a left field cut. Today he had a brain MRI scan. MRI per verbal report showed small occipital and temporal infarcts. Aiden's ophthalmologist suspected a small stroke had occurred and he told Aiden to follow up with neurology The patient's primary care doctor consulted Dr. Bonilla of neurology who told the patient to immediately come to the ER for evaluation after seeing signs of a stroke on brain MRI studies.     Here in the ER, Aiden states that he has had markedly worsened visual acuity over the past two days; specifically, he has struggled to read numbers at work in his left visual field. He denies weakness, numbness and confusion. Aiden also denies chest pain and palpitations. He rates his headache as a 8/10 and he denotes thoracic back pain, improved with massage.  He has no significant cardiac history.     Cardiac Risk Factors:  CAD:    neg  Hypertension:   pos  Hyperlipidemia:  neg  Diabetes:   neg  Tobacco use:   pos  Gender:   male  Age:    60 yo  Familial Hx of CAD:  pos    Allergies:  Crestor  Simvastatin  Atorvastatin     Medications:   "  Methylprednisolone  Oxycodone  Naphazoline-pheniramine  Flonase  Lisinopril  Fenofibrate  Kenalog  Fexofenadine  Hydrocortisone  Lactobacillus     Past Medical History:    Headaches  Malignant neoplasm of prostate  HTN  Hypercholesteremia   AR  Periocular dermatitides     Past Surgical History:    Extracapsular cataract extraction with intraocular lens implant  Prostate biopsy, needle, saturation sampling    Family History:    CAD  Cerebrovascular disease  HTN  Respiratory  Prostate cancer    Social History:  Relationship status:   Tobacco use: neg  Alcohol use: pos. No street drug use  The patient presents with a significant other.      Review of Systems   Eyes: Positive for visual disturbance.   Cardiovascular: Negative for chest pain and palpitations.   Musculoskeletal: Positive for back pain.   Neurological: Positive for dizziness and headaches. Negative for weakness and numbness.   Psychiatric/Behavioral: Negative for confusion.   All other systems reviewed and are negative.    Physical Exam   First Vitals:  BP: (!) 152/99  Heart Rate: 112  Temp: 98.1  F (36.7  C)  Resp: 14  Height: 170.2 cm (5' 7\")  Weight: 76.7 kg (169 lb)  SpO2: 94 %      Physical Exam  Constitutional:  Appears well-developed and well-nourished. Cooperative. Appears   uncomfortable   HENT:   Head:    Atraumatic.   Mouth/Throat:   Oropharynx is without erythema or exudate and mucous     membranes are moist.   Eyes:    Conjunctivae normal and EOM are normal.      Pupils are equal, round, and reactive to light.   Neck:    Normal range of motion. Neck supple.   Cardiovascular:  Normal rate, regular rhythm, normal heart sounds and radial and    dorsalis pedis pulses are 2+ and symmetric.    Pulmonary/Chest:  Effort normal and breath sounds normal.   Abdominal:   Soft. Bowel sounds are normal.      No splenomegaly or hepatomegaly. No tenderness. No rebound.   Musculoskeletal:  Normal range of motion. No edema and no tenderness. "   Neurological:  No cranial nerve deficit. Normal FNF. 5/5 upper and lower extremity strength.    Normal speech, normal object naming. Alert and oriented x 3. Left sided visual    deficit to confrontation, sensation to light touch intact to all four extremities  Skin:    Skin is warm and dry.     Psychiatric:   Normal mood and affect.     Emergency Department Course   ECG @ 2308  Indication: headaches and dizziness  Rate 72 bpm.   PA interval 130 ms.   QRS duration 84 ms.   QT/QTc 390/427 ms.   P-R-T axes -16.  Notes: Normal sinus rhythm. Cannot rule out anterior infarct, age undetermined. Abnormal EKG. Now criteria for anterior infarct.   Time read 2315     Imaging:  Radiographic findings were communicated with the patient who voiced understanding of the findings.  Head CT, without contrast, per radiology:   Pending    Neck of Head CTA, with and without contrast, per radiology:   Pending    Laboratory:  CBC: WNL (WBC 7.1, HGB 16.7, )   CMP: glucose 118 (H), o/w WNL (Creatinine 0.79)     2332: Troponin I: <0.015  2324: INR: 1.03  2324: PTT: 28    Interventions:  0036: Benadryl, 25 mg, IV  0041: Reglan, 10 mg, IV  0220: Aspirin, 325 mg, PO    Emergency Department Course:  Nursing notes and vitals reviewed.  I performed an exam of the patient as documented above.  The above workup was undertaken.  2314: I spoke with the patient's neurologist Dr. Duran.   2335: I spoke with Dr. Bonilla who asked me to rule out dissection with a CVA scan.   0000: I rechecked the patient and discussed results.  0135: I spoke with Dr. Gonzalez regarding patient admission.   Admit to a neurology telemetry bed under the care of Dr. Gonzalez.    Impression & Plan      Medical Decision Making:  Aiden Valdez is a 61 year old male who presents complaining of head pain and left sided vision changes that have been present for the last week or so. He was treated for sinusitis and he had CT imaging of his head and sinuses that returned  unremarkable. He saw an eye doctor who noted a visual field cut and sent him for an MRI today. MRI per Dr. Duran shows an infarct in his occipital lobe and temporal lobe that are small. I was unable to access the official reports due to EPIC down time issues at [facility] where the patient had his studies done. EKG does not show signs of dysrhythmia. Laboratory testing returned fairly unremarkable as noted above. I discussed the case with Dr. Bonilla on call for neurology who recommended obtaining a CTA of the head and neck. He wanted to evaluate for the possibility of dissection which was negative. There was a high grade stenosis in the right PCA with limited flow beyond and there is a 1 cm area of likely subacute infarct in the parasaggital right parietal occipital lobe.     I discussed the case again with Dr. Bonilla whom recommended oral Aspirin so a dose was given to the patient. The patient also obtained a Reglan and Benadryl which partially improved his head pain. Dr. Gonzalez from hospitalist services agreed to accept the patient for admission. I discussed the results with the patient and he voiced understanding. The patient's wife has already gone home and the patient will be admitted to the hospital.     Diagnosis:    ICD-10-CM   1. Cerebrovascular accident (CVA), unspecified mechanism (H) I63.9   2. Nonintractable episodic headache, unspecified headache type R51     Disposition:  Admit to a neurology telemetry bed under the care of Dr. Gonzalez.    Tito JUAREZ, am serving as a scribe on 5/17/2017 at 10:22 PM to personally document services performed by Andrew Silva MD, based on my observations and the provider's statements to me.     EMERGENCY DEPARTMENT       Andrew Silva MD  05/18/17 0902

## 2017-05-18 NOTE — PROGRESS NOTES
" 05/18/17 0858   Quick Adds   Type of Visit Initial Occupational Therapy Evaluation   Living Environment   Lives With alone   Living Arrangements apartment   Home Accessibility stairs to enter home;tub/shower is not walk in   Number of Stairs to Enter Home 2   Transportation Available car;family or friend will provide  (Pt still drives; however, family can provide for now.)   Self-Care   Dominant Hand right   Usual Activity Tolerance excellent   Current Activity Tolerance moderate   Equipment Currently Used at Home none   Functional Level Prior   Ambulation 0-->independent   Transferring 0-->independent   Toileting 0-->independent   Bathing 0-->independent   Dressing 0-->independent   Eating 0-->independent   Communication 0-->understands/communicates without difficulty   Swallowing 0-->swallows foods/liquids without difficulty   Cognition 0 - no cognition issues reported   Fall history within last six months no   General Information   Onset of Illness/Injury or Date of Surgery - Date 05/18/17   Referring Physician RUTHY Gonzalez MD   Patient/Family Goals Statement Per pt, \"I need to be able to read and see numbers again for my job.\"    Additional Occupational Profile Info/Pertinent History of Current Problem Admitted under observation for possible CVA of the L temporo-occipital lobe.    Precautions/Limitations fall precautions   Cognitive Status Examination   Orientation orientation to person, place and time   Level of Consciousness alert   Able to Follow Commands WNL/WFL   Personal Safety (Cognitive) WNL/WFL   Memory intact   Visual Perception   Visual Perception Wears glasses  (for reading onl)   Visual Perception Comments Blurred and doubled vision noted at eval. Per pt, \"The left eye is worse.\"   Sensory Examination   Sensory Quick Adds No deficits were identified   Sensory Comments Denies B UE numbness and tingling   Pain Assessment   Patient Currently in Pain No   Range of Motion (ROM)   ROM Quick Adds No deficits " "were identified   ROM Comment B UE WNL   Strength   Manual Muscle Testing Quick Adds No deficits were identified   Strength Comments B UE 5/5 on MMT   Hand Strength   Hand Strength Comments B grasp WFL   Coordination   Coordination Comments Mild B UE incoordination noted; however, possibly related to blurred vision.    Mobility   Bed Mobility Comments I with bed mobility   Transfer Skill: Bed to Chair/Chair to Bed   Level of [place]: Bed to Chair independent   Transfer Skill: Sit to Stand   Level of [place]: Sit/Stand independent   Transfer Skill: Toilet Transfer   Level of [place]: Toilet independent   Lower Body Dressing   Level of [place]: Dress Lower Body independent   Toileting   Level of [place]: Toilet independent   Grooming   Level of [place]: Grooming independent   Eating/Self Feeding   Level of [place]: Eating independent   Instrumental Activities of Daily Living (IADL)   Previous Responsibilities meal prep;housekeeping;laundry;medication management;finances;driving;work   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   Activities of Daily Living Analysis   ADL Comments visual deficit   Clinical Impression   Criteria for Skilled Therapeutic Interventions Met evaluation only   OT Diagnosis Decreased I with IADLs   Influenced by the following impairments visual changes   Assessment of Occupational Performance 1-3 Performance Deficits   Identified Performance Deficits IADLs   Clinical Decision Making (Complexity) Low complexity   Predicted Duration of Therapy Intervention (days/wks) Eval only   Anticipated Discharge Disposition Home with Assist;Home with Outpatient Therapy   Risks and Benefits of Treatment have been explained. Yes   Patient, Family & other staff in agreement with plan of care Yes   [facility] AM-PAC TM \"6 Clicks\"   2016, Trustees of [facility], under license to [employer].  All rights reserved.   6 Clicks Short Forms Daily Activity Inpatient " "Short Form   [facility] AM-PAC  \"6 Clicks\" Daily Activity Inpatient Short Form   1. Putting on and taking off regular lower body clothing? 4 - None   2. Bathing (including washing, rinsing, drying)? 4 - None   3. Toileting, which includes using toilet, bedpan or urinal? 4 - None   4. Putting on and taking off regular upper body clothing? 4 - None   5. Taking care of personal grooming such as brushing teeth? 4 - None   6. Eating meals? 4 - None   Daily Activity Raw Score (Score out of 24.Lower scores equate to lower levels of function) 24   Total Evaluation Time   Total Evaluation Time (Minutes) 20     "

## 2017-05-18 NOTE — PHARMACY
Prescriber Notification Note    The pharmacist has communicated with this patient's provider regarding a concern or therapy recommendation.    Notified Person: Dr. Vargas   Date/Time of Notification: 1330 on 5/18  Interaction: phone  Concern/Recommendation: originally it was thought pt was on pravastatin PTA and was ordered, but upon finishing his medication list it was noted pt NOT on a statin and in fact has intolerances to 3 of them. Cholesterol levels today came back at SYT=460; HDL=51. Spoke with MD whether or not to continue pravastatin. To note, pravastatin does have the least amount of drug interactions/side effect.     Comments/Additional Details: Per MD - risk benefit with statin.  Given his present condition and LDL goal <100, would advocate for keeping pravastatin. MD will speak with patient and advise him of side effects to watch out for.     Dona Baugh, PharmD

## 2017-05-18 NOTE — PLAN OF CARE
Problem: Goal Outcome Summary  Goal: Goal Outcome Summary  Outcome: No Change  A&Ox4. Neuros: headache, otherwise intact. VSS on RA.  NIH on admission 0. Tele SB. Regular diet. Up with SBA. Denies pain. Plan for neuro consult, PT/OT/SLP evals today.

## 2017-05-18 NOTE — TELEPHONE ENCOUNTER
I spoke with Aiden this afternoon when he was at his ophthomologist's Dr Luisana Lucia  office. He was found to have homonymous hemianopsia on computerized visual field testing. He had been seen two days before and the physician exam was apparently okay but more testing was ordered for today. An mri of his brain was ordered today and he was found to have two areas of stroke per my discussion with Dr Aureliano villeda. The MRI was done at [facility] in [place]. I have subsequently discussed the patient with Dr Farhan Bonilla and we agree the patient should go to the [facility] for evaluation. I spoke to the patient and his wife and explained this and he agrees he will go to the ER now. I spoke to the emergency room MD Dr Brito  and informed him of the patient.  Manish Larson MD

## 2017-05-18 NOTE — H&P
PRIMARY CARE PROVIDER:  Jarret Tay MD      CHIEF COMPLAINT:  Headache and blurry vision.      HISTORY OF PRESENT ILLNESS:  Mr. Aiden Valdez is a delightful 61-year-old  gentleman with a past medical history notable for hypertension, dyslipidemia, prostate cancer, and allergic rhinitis, who had been in his usual state of health until approximately 10 days ago when he started having an occipital headache, pulsating in nature, 6-8/10 in severity, with radiation to his temples, as well as blurry vision primarily on the left.  He saw his primary care physician on 05/08/2017 when his symptoms were attributed to a sinus infection, and therefore he was prescribed an antibiotic.  Due to persistent symptoms, he was reevaluated on 05/11 when CT scan of the head was obtained which, according to the patient, was essentially unremarkable.  His decreased vision prompted the patient to see an ophthalmologist on 05/15, when was noted to have some visual field defects, and therefore an MRI was arranged for 05/17 at [facility] which, according to the preliminary report, showed two small left temporo-occipital recent strokes.  He therefore received a call from his primary care physician and was referred to the Emergency Department at [facility] for further management.  The patient has no previous history of strokes.      In the ER, the patient has been evaluated by Dr. Silva, the ER attending physician.  He had essentially unremarkable chemistry and hematology profiles.  Troponin I is less than 0.015.  The electrocardiogram reveals normal sinus rhythm with a rate of 72 beats per minute.      CT head with contrast, and CT angiography of the head and neck show high-grade right posterior cerebral artery stenosis, and a 1 cm right parietal stroke, per preliminary report.  The case has been communicated to Dr. Bonilla, the on-call neurologist.  He is being admitted to the hospital under observation status.       On direct questioning, the patient reports no double vision, abnormal speech, focal weakness, abnormal gait, chest pain, palpitations, nausea, vomiting, diarrhea, or urinary symptoms.      PAST MEDICAL HISTORY:   1.  Hypertension.   2.  Dyslipidemia.   3.  Prostate cancer, diagnosed approximately two years ago.   4.  Allergic rhinitis.   5.  Periocular dermatitis.      PAST SURGICAL HISTORY:   1.  Right cataract surgery.   2.  Prostate biopsy.      FAMILY HISTORY:  Mother had a history of a stroke at the age of 80.  She eventually  at the age of 95 of old age.  Father  of pneumonia in his 80s.      SOCIAL HISTORY:  The patient is originally from [place], but has lived in the [place] for approximately 40 years.  He is .  He denies smoking.  He drinks alcohol on occasion.  He does not use recreational drugs.      MEDICATIONS:  He is apparently on the following medications.   1.  Fenofibrate 145 mg p.o. daily.   2.  Pravachol.   3.  Fexofenadine 180 mg p.o. daily.   4.  Flonase 50 mcg, 1-2 sprays into both nostrils daily.   5.  Lisinopril 10 mg p.o. daily.   6.  Oxycodone 5 mg p.o. every 4 hours p.r.n. pain.      ALLERGIES AND INTOLERANCES:  Crestor, simvastatin, atorvastatin.  All cause muscle aches.      REVIEW OF SYSTEMS:  Ten-point review of system was performed thoroughly and was negative except as stated in the history of present illness.      PHYSICAL EXAMINATION:   GENERAL:  This patient is a very pleasant gentleman who is in no acute distress.   VITAL SIGNS:  Blood pressure 155/104, heart rate 56, respiratory rate 10, temperature 98.1 degrees Fahrenheit, oxygen saturation 94% on room air.   HEENT:  The pupils are round, equal, reactive to light bilaterally.  There is no conjunctival pallor or scleral icterus.  The oral mucosa appears moist.   NECK:  Supple.  There is no elevated JVP.   LUNGS:  Clear to auscultation bilaterally.  No crackles, wheezing or rhonchi.   CARDIOVASCULAR:  There  is normal S1 and S2 with regular rate and rhythm.  No S3, S4 or murmur is audible.   ABDOMEN:  Soft, nontender, nondistended.  Bowel sounds are present.   EXTREMITIES:  There is no calf tenderness or lower extremity edema or joint swelling.   SKIN:  There is no jaundice, cyanosis, or acute rash.   NEUROLOGIC:  He is awake, alert, oriented x3.  Speech is normal.  There is no facial asymmetry on examination of cranial nerves II-XII.  Motor strength is 5/5 throughout the extremities.  Gait was not tested.      DATA:   1.  Twelve-lead electrocardiogram:  Normal sinus rhythm with a rate of 72 beats per minute.   2.  Chemistry profile:  Sodium 140, potassium 3.8, chloride 106, bicarbonate 27, BUN 16, creatinine 0.79, calcium 9.2, albumin 3.6, alkaline phosphatase 53, ALT 21, AST 18, glucose 118, troponin I less than 0.015.   3.  Hematology profile:  White count 7.1, hemoglobin 16.7, platelet count 295,000.   4.  CT angiography of the head and neck, and CT of the head without contrast, per preliminary report:  High-grade right posterior cerebral artery stenosis, and a 1 cm right parietal stroke.      ASSESSMENT AND PLAN:  Mr. Aiden Valdez is a 61-year-old  gentleman with a past medical history notable for hypertension, dyslipidemia, and prostate cancer, who has presented with over a 10-day history of headache and blurry vision.  MRI of the brain earlier in an outside facility revealed two small left temporo-occipital strokes, per verbal report.  In the emergency department, head imaging shows high-grade right posterior cerebral artery stenosis, and a 1 cm right parietal stroke.   1.  Acute/subacute right parietal stroke:  Likely due to high-grade right posterior cerebral artery stenosis.  The patient has normal sinus rhythm on electrocardiogram.  His risk factors for stroke are hypertension and dyslipidemia.  Currently, his blood pressure is running slightly high at 155/104.  Aside from visual field loss, neuro  examination is essentially unremarkable.  I will keep the patient n.p.o. until he undergoes a formal speech/swallow evaluations.  I will keep the patient on telemetry.  I will obtain a 2D echocardiogram with bubble studies.  I will treat the patient with aspirin 324 mg p.o. daily, and Pravachol 20 mg p.o. daily.  His blood glucose is slightly elevated at 118, which is a nonfasting level; however, I will check hemoglobin A1c to rule out prediabetes.  I will place a consult for the Neurology Service.   2.  Dyslipidemia:  It seems that the patient is on pravastatin, but his home medications have not been yet reconciled.  He is intolerant of atorvastatin, simvastatin and rosuvastatin due to muscle aches.  I will resume his prior to admission statin therapy.  I will check a fasting lipid profile.   3.  Hypertension:  At home he is on lisinopril 10 mg p.o. daily.  His blood pressure is currently mildly elevated.  I will increase the dose of lisinopril to 40 mg p.o. daily, as the onset of stroke was more than 48 hours ago.  In addition, I will have IV labetalol available for systolic blood pressure more than 170.   4.  DVT prophylaxis:  Pneumatic compression devices.   5.  CODE STATUS:  It was discussed and a FULL CODE was established.   6.  Disposition:  I anticipate less than 48 hours of hospital stay.      I would like to thank Dr. Jarret Tay for allowing the Hospitalist Service to participate in the care of this patient.         SHAYY MADRIGAL MD             D: 2017 02:25   T: 2017 03:33   MT: [id number]      Name:     CHRIS CISNEROS   MRN:      [id number]        Account:      [id number]   :      1955           Admitted:     [id number]      Document: [id number]       cc: Jarret Bonilla MD

## 2017-05-19 ENCOUNTER — APPOINTMENT (OUTPATIENT)
Dept: CARDIOLOGY | Facility: CLINIC | Age: 62
DRG: 066 | End: 2017-05-19
Attending: PSYCHIATRY & NEUROLOGY
Payer: COMMERCIAL

## 2017-05-19 VITALS
SYSTOLIC BLOOD PRESSURE: 133 MMHG | HEIGHT: 67 IN | HEART RATE: 57 BPM | BODY MASS INDEX: 26.53 KG/M2 | DIASTOLIC BLOOD PRESSURE: 89 MMHG | TEMPERATURE: 97.6 F | WEIGHT: 169 LBS | RESPIRATION RATE: 16 BRPM | OXYGEN SATURATION: 96 %

## 2017-05-19 LAB
ANION GAP SERPL CALCULATED.3IONS-SCNC: 6 MMOL/L (ref 3–14)
BUN SERPL-MCNC: 10 MG/DL (ref 7–30)
CALCIUM SERPL-MCNC: 8.7 MG/DL (ref 8.5–10.1)
CHLORIDE SERPL-SCNC: 110 MMOL/L (ref 94–109)
CO2 SERPL-SCNC: 26 MMOL/L (ref 20–32)
CREAT SERPL-MCNC: 0.79 MG/DL (ref 0.66–1.25)
CRP SERPL HS-MCNC: 0.6 MG/L
GFR SERPL CREATININE-BSD FRML MDRD: ABNORMAL ML/MIN/1.7M2
GLUCOSE SERPL-MCNC: 103 MG/DL (ref 70–99)
POTASSIUM SERPL-SCNC: 4.1 MMOL/L (ref 3.4–5.3)
SODIUM SERPL-SCNC: 142 MMOL/L (ref 133–144)
VIT B12 SERPL-MCNC: 384 PG/ML (ref 193–986)

## 2017-05-19 PROCEDURE — 93272 ECG/REVIEW INTERPRET ONLY: CPT | Performed by: INTERNAL MEDICINE

## 2017-05-19 PROCEDURE — 99239 HOSP IP/OBS DSCHRG MGMT >30: CPT | Performed by: INTERNAL MEDICINE

## 2017-05-19 PROCEDURE — 25000128 H RX IP 250 OP 636: Performed by: INTERNAL MEDICINE

## 2017-05-19 PROCEDURE — 93325 DOPPLER ECHO COLOR FLOW MAPG: CPT

## 2017-05-19 PROCEDURE — 93325 DOPPLER ECHO COLOR FLOW MAPG: CPT | Mod: 26 | Performed by: INTERNAL MEDICINE

## 2017-05-19 PROCEDURE — 93312 ECHO TRANSESOPHAGEAL: CPT | Mod: 26 | Performed by: INTERNAL MEDICINE

## 2017-05-19 PROCEDURE — 25000132 ZZH RX MED GY IP 250 OP 250 PS 637: Performed by: INTERNAL MEDICINE

## 2017-05-19 PROCEDURE — 93320 DOPPLER ECHO COMPLETE: CPT | Mod: 26 | Performed by: INTERNAL MEDICINE

## 2017-05-19 PROCEDURE — 40000857 ZZH STATISTIC TEE INCLUDES SEDATION

## 2017-05-19 PROCEDURE — 93270 REMOTE 30 DAY ECG REV/REPORT: CPT | Performed by: PSYCHIATRY & NEUROLOGY

## 2017-05-19 PROCEDURE — 25000132 ZZH RX MED GY IP 250 OP 250 PS 637: Performed by: PSYCHIATRY & NEUROLOGY

## 2017-05-19 PROCEDURE — 80048 BASIC METABOLIC PNL TOTAL CA: CPT | Performed by: INTERNAL MEDICINE

## 2017-05-19 PROCEDURE — 25000125 ZZHC RX 250: Performed by: INTERNAL MEDICINE

## 2017-05-19 PROCEDURE — 36415 COLL VENOUS BLD VENIPUNCTURE: CPT | Performed by: INTERNAL MEDICINE

## 2017-05-19 RX ORDER — NALOXONE HYDROCHLORIDE 0.4 MG/ML
.1-.4 INJECTION, SOLUTION INTRAMUSCULAR; INTRAVENOUS; SUBCUTANEOUS
Status: DISCONTINUED | OUTPATIENT
Start: 2017-05-19 | End: 2017-05-19 | Stop reason: HOSPADM

## 2017-05-19 RX ORDER — FENTANYL CITRATE 50 UG/ML
50-100 INJECTION, SOLUTION INTRAMUSCULAR; INTRAVENOUS
Status: DISCONTINUED | OUTPATIENT
Start: 2017-05-19 | End: 2017-05-19 | Stop reason: HOSPADM

## 2017-05-19 RX ORDER — ASPIRIN 325 MG
325 TABLET, DELAYED RELEASE (ENTERIC COATED) ORAL DAILY
Qty: 60 TABLET | Refills: 0 | Status: SHIPPED | OUTPATIENT
Start: 2017-05-19 | End: 2018-05-07 | Stop reason: DRUGHIGH

## 2017-05-19 RX ORDER — LIDOCAINE HYDROCHLORIDE 40 MG/ML
1.5 SOLUTION TOPICAL ONCE
Status: COMPLETED | OUTPATIENT
Start: 2017-05-19 | End: 2017-05-19

## 2017-05-19 RX ORDER — LISINOPRIL 40 MG/1
40 TABLET ORAL DAILY
Qty: 30 TABLET | Refills: 0 | Status: SHIPPED | OUTPATIENT
Start: 2017-05-19 | End: 2017-06-01

## 2017-05-19 RX ORDER — FENTANYL CITRATE 50 UG/ML
25-50 INJECTION, SOLUTION INTRAMUSCULAR; INTRAVENOUS
Status: DISCONTINUED | OUTPATIENT
Start: 2017-05-19 | End: 2017-05-19 | Stop reason: HOSPADM

## 2017-05-19 RX ORDER — FLUMAZENIL 0.1 MG/ML
0.2 INJECTION, SOLUTION INTRAVENOUS
Status: DISCONTINUED | OUTPATIENT
Start: 2017-05-19 | End: 2017-05-19 | Stop reason: HOSPADM

## 2017-05-19 RX ORDER — GLYCOPYRROLATE 0.2 MG/ML
0.1 INJECTION, SOLUTION INTRAMUSCULAR; INTRAVENOUS ONCE
Status: COMPLETED | OUTPATIENT
Start: 2017-05-19 | End: 2017-05-19

## 2017-05-19 RX ORDER — SODIUM CHLORIDE 9 MG/ML
INJECTION, SOLUTION INTRAVENOUS CONTINUOUS PRN
Status: DISCONTINUED | OUTPATIENT
Start: 2017-05-19 | End: 2017-05-19 | Stop reason: HOSPADM

## 2017-05-19 RX ADMIN — MIDAZOLAM 5 MG: 1 INJECTION INTRAMUSCULAR; INTRAVENOUS at 09:33

## 2017-05-19 RX ADMIN — ACETAMINOPHEN 650 MG: 325 TABLET, FILM COATED ORAL at 11:56

## 2017-05-19 RX ADMIN — SODIUM CHLORIDE: 9 INJECTION, SOLUTION INTRAVENOUS at 08:15

## 2017-05-19 RX ADMIN — VITAMIN D, TAB 1000IU (100/BT) 2000 UNITS: 25 TAB at 11:56

## 2017-05-19 RX ADMIN — FENTANYL CITRATE 75 MCG: 50 INJECTION INTRAMUSCULAR; INTRAVENOUS at 09:34

## 2017-05-19 RX ADMIN — LIDOCAINE HYDROCHLORIDE 1.5 ML: 40 SOLUTION TOPICAL at 08:14

## 2017-05-19 RX ADMIN — ASPIRIN 325 MG: 325 TABLET, DELAYED RELEASE ORAL at 11:13

## 2017-05-19 RX ADMIN — GLYCOPYRROLATE 0.1 MG: 0.2 INJECTION, SOLUTION INTRAMUSCULAR; INTRAVENOUS at 08:09

## 2017-05-19 RX ADMIN — LISINOPRIL 40 MG: 40 TABLET ORAL at 11:56

## 2017-05-19 RX ADMIN — SODIUM CHLORIDE: 9 INJECTION, SOLUTION INTRAVENOUS at 04:02

## 2017-05-19 ASSESSMENT — VISUAL ACUITY
OU: GLASSES;BLURRED VISION

## 2017-05-19 NOTE — PLAN OF CARE
Problem: Goal Outcome Summary  Goal: Goal Outcome Summary  Outcome: Adequate for Discharge Date Met:  05/19/17  Pt alert and oriented x4. VSS. Tele NSR. Neuros with slight L eye deviation toward midline, blurry vision in L eye and HA. CMS intact. HUYEN completed. Tolerating regular diet. Ambulating independently. Plan to d/c home this afternoon with 24 hr cardiac monitor and O/P OT.

## 2017-05-19 NOTE — PLAN OF CARE
Mr. Valdez had a stroke that affected his vision  He can return to work in 2 weeks.     If you have any questions, please do not hesitate to contact me    Ty Goyal MD, PhD, FAHA  Director of Comprehensive Stroke Center  [facility]  Tel:  [phone]  Fax: [phone]

## 2017-05-19 NOTE — PLAN OF CARE
Problem: Goal Outcome Summary  Goal: Goal Outcome Summary  Outcome: Improving  A&O. Neuros intact except blurry vision and mild HA. VSS. Tele SB. Up independently. Tylenol given x 1 for HA. NPO since 0000 for HUYEN today.

## 2017-05-19 NOTE — DISCHARGE INSTRUCTIONS
Your risk factors for stroke or TIA (transient ischemic attack):    Your Risk Factors Your Results Normal Ranges   High blood pressure BP Readings from Last 1 Encounters:   05/19/17 133/89    Less than 120/80   Cholesterol              Total Lab Results   Component Value Date    CHOL 222 05/18/2017      Less than 150    Triglycerides   Lab Results   Component Value Date    TRIG 151 05/18/2017    Less than 150   LDL Lab Results   Component Value Date     05/18/2017       Less than 70   HDL Lab Results   Component Value Date    HDL 51 05/18/2017            Greater than 40 (men)  Greater than 50 (women)   Diabetes   Recent Labs  Lab 05/19/17  0730   *    Fasting blood glucose    Smoking/tobacco use  Quit smoking and tobacco   Overweight  Lose 1-2 pounds a week   Lack of exercise  30 minutes moderate activity each day   Other risk factors include carotid (neck) artery disease, atrial fibrillation and stress. You may be on new medicine to treat high blood pressure, cholesterol, diabetes or atrial fibrillation.    Understanding Stroke Booklet given to patient. Please refer to booklet for further information.    Stroke warning signs and symptoms - CALL 911 right away for:  - Sudden numbness or weakness in the face, arm or leg (often on one side of the body).  - Sudden confusion or trouble understanding what is going on.  - Sudden blurred or decreased vision in one or both eyes.  - Sudden trouble speaking, loss of balance, dizziness or problems with coordination.  - Sudden, severe headache for no reason.  - Fainting or seizures.  - Symptoms may go away then come back suddenly.

## 2017-05-19 NOTE — PROGRESS NOTES
[facility]  [facility] [place]  STROKE WORKUP SUMMARY      Date of Admission: 5/17/2017    Hospital Day: 3        STROKE SUMMARY:    Type of Stroke:  --Unclear, possibly thromboembolic  Risk factors:  --Atherosclerosis in L PCA vs embolic event  Recommendations:  --Activity: Ad nakia  --Diet: Regular    --Aspirin 325 mg   --Patient has intolerance to statins  --Repeat MRA brain in 4 weeks.  --CardioNet for 30 days   --Outpatient occupational therapy  Follow up:  --Dr. Goyal / Nohelia Mariee NP in [facility] in 4weeks      Interval History:   Patient presented with visual changes for over 10 days. MRI brain demonstrated left occipital strokes and PCA stenosis/plaque  Patient completed stroke workup.    Patient achieved maximal benefit from this hospitalization        MEDICATIONS:          Medications Prior to Admission:     Prescriptions Prior to Admission   Medication Sig Dispense Refill Last Dose     Lactobacillus (PROBIOTIC ACIDOPHILUS PO) Take 100 mg by mouth 2 times daily as needed        Naphazoline-Pheniramine (OPCON-A OP) Place 1-2 drops into both eyes daily as needed (allergies)    Taking     lisinopril (PRINIVIL/ZESTRIL) 10 MG tablet Take 1 tablet (10 mg) by mouth daily 90 tablet 3  at am     fenofibrate 145 MG tablet Take 1 tablet (145 mg) by mouth daily 90 tablet 3  at am     triamcinolone (KENALOG) 0.1 % cream Apply sparingly to affected area three times daily as needed 80 g 0 Taking     FEXOFENADINE HCL PO Take 180 mg by mouth daily   Taking     hydrocortisone (PROCTOSOL HC) 2.5 % rectal cream place rectally twice daily as directed. 28.35 g 0 Taking             Discharge Medications:     Current Discharge Medication List      CONTINUE these medications which have NOT CHANGED    Details   Lactobacillus (PROBIOTIC ACIDOPHILUS PO) Take 100 mg by mouth 2 times daily as needed      Naphazoline-Pheniramine (OPCON-A OP) Place 1-2 drops into both eyes daily as  "needed (allergies)       lisinopril (PRINIVIL/ZESTRIL) 10 MG tablet Take 1 tablet (10 mg) by mouth daily  Qty: 90 tablet, Refills: 3    Associated Diagnoses: Encounter for medication refill      fenofibrate 145 MG tablet Take 1 tablet (145 mg) by mouth daily  Qty: 90 tablet, Refills: 3    Associated Diagnoses: Hypercholesteremia      triamcinolone (KENALOG) 0.1 % cream Apply sparingly to affected area three times daily as needed  Qty: 80 g, Refills: 0    Associated Diagnoses: Encounter for medication refill      FEXOFENADINE HCL PO Take 180 mg by mouth daily      hydrocortisone (PROCTOSOL HC) 2.5 % rectal cream place rectally twice daily as directed.  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 Will not continue Pravachol.     Can be d/c today      Examination:   169 lbs 0 oz  5' 7\"  Neurological Examination upon discharge:  Neuro:       Mental Status Exam:   Awake, alert, oriented X3. Speech and language are intact. Mental status is normal       Cranial Nerves:  Pupils 3 mm, reactive. EOMI. Face sensation is normal. Face is symmetric.Tongue and uvula are midline. Other CN are normal           Motor:  5/5 X 4. Tone and bulk are normal           Reflexes:  Normal DTR.Toes downgoing.        Sensory:  Normal to PP, LT, vibration and MIYA                   Coordination:   Intact finger-to-nose and toe-to-finger tests       Gait:  No significant " difficulties    -----------------------------------------------------------------------------------------------------------         Data:   Laboratory Data:  CBC RESULTS:     Recent Labs  Lab 05/17/17 2225   WBC 7.1   RBC 5.19   HGB 16.7   HCT 47.6        Basic Metabolic Panel:    Recent Labs  Lab 05/19/17  0730 05/17/17 2225    140   POTASSIUM 4.1 3.8   CHLORIDE 110* 106   CO2 26 27   BUN 10 16   CR 0.79 0.79   * 118*   LATRICIA 8.7 9.2     INR:  Recent Labs  Lab 05/17/17 2225   INR 1.03      Lipid Profile:  Recent Labs   Lab Test  05/18/17 0820 02/01/17   0910   CHOL  222*  210*   HDL  51  50   LDL  141*  142*   TRIG  151*  91     --Thyroid Panel--  TSH:  Recent Labs  Lab 05/18/17  0820   TSH 1.48          --Nutrition panel--    Vitamin B12:   Recent Labs  Lab 05/18/17  1125   B12 364      Vitamin D level:   Recent Labs  Lab 05/18/17 0820   VITDT 19*     Folate: @LABRCNTIP (FOLIC:3)@  A1C:   Recent Labs  Lab 05/18/17  0820   A1C 5.7            Cardiac Studies:    HUYEN  Normal transesophageal echocardiogram. No cardiac source of embolus identified. There is no comparison study  available.     Imaging:   CT head:   Tiny focus of hypodensity at the right temporooccipital  junction that could represent a tiny recent infarct. Brain MRI may be  helpful for further evaluation. Diffuse cerebral volume loss and  cerebral white matter changes consistent with chronic small vessel  ischemic disease.   CTA neck/head:  1. Focus of moderate narrowing of the P1 segment of the right  posterior cerebral artery. This may be atherosclerotic in nature.  2. Severe stenosis/subtotal occlusion of the mid P2 segment of the  right posterior cerebral artery that may represent atherosclerotic  narrowing but could also represent thrombotic/embolic narrowing. There  is flow into the two large branches of the right posterior cerebral  artery.  3. Mild atherosclerotic narrowing of the supraclinoid distal internal  carotid  arteries on both sides.  4. Calcified atherosclerotic plaque without significant narrowing at  the origins of the vertebral and internal carotid arteries  bilaterally.  5. Otherwise, normal neck and head CTA.  MRI brain ([facility]):   1. Multiple small subacute infarcts are seen in the right PCA distribution. No hemorrhage or global mass effect at this time.   2. Conclusion are slowed flow along the P2 segment of the right posterior cerebral artery.   3. Small foci of presumed chronic infarction are noted elsewhere in the cerebral white matter.   MRI Orbits ([facility]):   1. No orbital abnormality identified.   2. Slight ethmoid sinus inflammatory change.        Education:   Reviewed written information on the followin. Patient was educated on the stroke warning signs  2. We discussed medications prescribed at discharge  3. We discussed stroke risk factors that are attributable to patient  4. We discussed importance of activation of emergency medical system in case of recurrent symptoms.   5. We discussed follow up for stroke.    Patient understood educational information and asked appropriate questions.        Time:   Time Spend: over 45 minutes on discharge    ----------------------------------------------------------------------  Ty Goyal MD, PhD, [facility]  Director of Stroke Center  [facility]

## 2017-05-19 NOTE — PROGRESS NOTES
Unable to contact Dr Goayl's  Ronny.  Left message for her to call myself or pt's cell w/ date/time of f/u appointment and MRA in 4 weeks.  Cardionet ordered. Pt will need OP OT for vision rehab.  Await Dr Vargas for dc orders.

## 2017-05-21 LAB — CRP SERPL HS-MCNC: 0.7 MG/L

## 2017-05-22 ENCOUNTER — MEDICAL CORRESPONDENCE (OUTPATIENT)
Dept: FAMILY MEDICINE | Facility: CLINIC | Age: 62
End: 2017-05-22

## 2017-05-22 ENCOUNTER — CARE COORDINATION (OUTPATIENT)
Dept: CARE COORDINATION | Facility: CLINIC | Age: 62
End: 2017-05-22

## 2017-05-22 NOTE — PROGRESS NOTES
"ED/Discharge Protocol    \"Hi, my name is @Mazin Miramontes, MA   @, a Care Coordinator, and I am calling on behalf of Jarret Taylor's office at [facility].  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    How are you doing now that you are home?\" Vision is cloudy.     Is patient experiencing symptoms that may require a hospital visit?  Patient is okay.    Discharge Instructions    \"Let's review your discharge instructions.  What is/are the follow-up recommendations?  Pt. Response: Patient is told to F/U with Dr. Tay.    \"Were you instructed to make a follow-up appointment?\"  Pt. Response: Yes.  Has appointment been made?   No.  \"Can I help you schedule that appointment?\" Yes. (I transferred call to [facility]).      \"When you see the provider, I would recommend that you bring your discharge instructions with you. And bring medications/or list along with you.    Medications    \"How many new medications are you on since your hospitalization/ED visit?\"    0-1 Patient is now taking Aspirin.  \"How many of your current medicines changed (dose, timing, name, etc.) while you were in the hospital/ED visit?\"   0-1 Lisinopril dosage went up to 40mg.  \"Do you have questions about your medications?\"   Yes, the dosage on BP med and Aspirin seems a lot for patient. He is wondering if he needs to take it as it currently is.  \"Were you newly diagnosed with heart failure, COPD, diabetes, dvt, blood clot, pulmonary emboli or did you have a heart attack?\"   No  For patients on insulin: \"Did you start on insulin in the hospital or did you have your insulin dose changed?\"   No    Medication reconciliation completed? Yes    Was MTM referral placed (*Make sure to put transitions as reason for referral)?   No    Call Summary    \"Do you have any " "questions or concerns about your condition or care plan at the moment?\"    Yes, patient is told that his results at the hospital are normal upon discharge. He is concerned about why he is wearing a Cardio Net monitor.  Triage advice given: YES follow up with PCP             YES  continue with ED advise             YES contact hospital, if symptoms return or get worse    Patient was in ER 0 in the past year (assess appropriateness of ER visits.)      \"If you have questions or things don't continue to improve, we encourage you contact us through the main clinic number,  [phone]. If the clinic is not open, the on-call provider is available to help you.     \"Thank you for your time and take care!\"              "

## 2017-05-23 ENCOUNTER — OFFICE VISIT (OUTPATIENT)
Dept: FAMILY MEDICINE | Facility: CLINIC | Age: 62
End: 2017-05-23

## 2017-05-23 DIAGNOSIS — I63.431 CEREBRAL INFARCTION DUE TO EMBOLISM OF RIGHT POSTERIOR CEREBRAL ARTERY (H): Primary | ICD-10-CM

## 2017-05-23 PROCEDURE — 99496 TRANSJ CARE MGMT HIGH F2F 7D: CPT | Performed by: FAMILY MEDICINE

## 2017-05-23 NOTE — PROGRESS NOTES
Problem(s) Oriented visit        SUBJECTIVE:                                                    Aiden Valdez is a 61 year old male who presents to clinic today for the following health issues :        Hospital Follow-up Visit:    Hospital/Nursing Home/IP Rehab Facility: [facility]  Date of Admission:   Date of Discharge:   Reason(s) for Admission: stroke              Problems taking medications regularly:  None       Medication changes since discharge: None       Problems adhering to non-medication therapy:  None    Summary of hospitalization:  [facility] discharge summary reviewed    Aiden Valdez was admitted on 5/17/2017 by Marilin Gonzalez MD and I would refer you to their history and physical. The following problems were addressed during his hospitalization:     1. Acute/subacute right parietal stroke: Aside from visual field loss, neuro examination was essentially unremarkable.   ---Neurology consulted  ---kept the patient n.p.o. until he undergoes a formal speech/swallow evaluations.   --- Placed on telemetry and on normal sinus rrhythm  ----Echocardiogram with bubble studies negative  ----aspirin 324 mg p.o. Daily,  ----Intolerance for statin.   ---- Etiology unclear.   --- CardiNet for 30 days  --- Follow up in out patient clinic with MRA in Neurology clinic with Dr Gaines in 4 weeks   -- Out patient OT to follow     2. Dyslipidemia: He is intolerant of atorvastatin, simvastatin and rosuvastatin due to muscle aches. On Fenofibrate. fasting lipid profile , Cholesterol 222, triglyceride 151. Pravachol was started and later discontinued by neurology due to history of statin intolerance   3. Hypertension: At home he was on lisinopril 10 mg p.o. Daily and dose was increased 40 mg p.o. Daily for better SBP control. Recommend to follow up with his PCP        Patient was seen prior to discharge and found to be stable. He was advised to follow with his PCP, Neurology and OT     Code  Status:    Full Code     Brief Hospital Stay Summary Sent Home With Patient in AVS:     Reason for your hospital stay   CVA                    Important Results:    See below.     Pending Results:       Unresulted Labs Ordered in the Past 30 Days of this Admission     Date and Time Order Name Status Description     5/18/2017 0820 CRP cardiac risk In process           Discharge Instructions and Follow-Up:    Follow-up Appointments   Follow Up and recommended labs and tests   Follow up with primary care provider, Jarret Tay, within 7 days   for hospital follow- up. No follow up labs or test are needed.           Follow Up and recommended labs and tests   Follow up with Dr Goyal in neurology clinic with MRA of the brain in 4   weeks.         Diagnostic Tests/Treatments reviewed.  Follow up needed: none  Other Healthcare Providers Involved in Patient s Care:         None  Update since discharge: stable.     Post Discharge Medication Reconciliation: discharge medications reconciled and changed, per note/orders (see AVS).  Plan of care communicated with patient              Problem list, Medication list, Allergies, and Medical/Social/Surgical histories reviewed in [facility] and updated as appropriate.   Additional history: as documented    ROS:  5 point ROS completed and negative except noted above, including Gen, CV, Resp, GI, MS    Histories:   Patient Active Problem List   Diagnosis     Hypercholesteremia     Advanced directives, counseling/discussion     Family history of prostate cancer     Essential hypertension     Malignant neoplasm of prostate (H)     Headache, unspecified headache type     Stroke (cerebrum) (H)     Stroke (H)     Past Surgical History:   Procedure Laterality Date     EXTRACAPSULAR CATARACT EXTRATION WITH INTRAOCULAR LENS IMPLANT       PROSTATE BIOPSY, NEEDLE, SATURATION SAMPLING         Social History   Substance Use Topics     Smoking status: Never Smoker     Smokeless tobacco: Never Used      Alcohol use Yes     Family History   Problem Relation Age of Onset     C.A.D. Mother      CEREBROVASCULAR DISEASE Mother      Hypertension Mother      Respiratory Father      Prostate Cancer Brother            OBJECTIVE:                                                    There were no vitals taken for this visit.  There is no height or weight on file to calculate BMI.   APPEARANCE: = Relaxed and in no distress  Conj/Eyelids = noninjected and lids and lashes are without inflammation  PERRLA/Irises = Pupils Round Reactive to Light and Irisis without inflammation  Neck = No anterior or posterior adenopathy appreciated.  Thyroid = Not enlarged and no masses felt  Resp effort = Calm regular breathing  Breath Sounds = Good air movement with no rales or rhonchi in any lung fields  Heart Rate, Rythym, & sounds (no Murm)  = Regular rate and rythym with no S3, S4, or murmer appreciated.  Carotid Art's = Pulses full and equal and no bruits appreciated  Abdomen = Soft, nontender, no masses, & bowel sounds in all quadrants  Liver/Spleen = Normal span and no splenomegaly noted  Digits and Nails = FROM in all finger joints, no nail dystrophy  Ext (edema) = No pretibial edema noted or elsewhere  Musculsktl =  Strength and ROM of major joints are within normal limits  SKIN = absent significant rashes or lesions   Recent/Remote Memory = Alert and Oriented x 3  Mood/Affect = Cooperative and interested     ASSESSMENT/PLAN:                                                        Diagnoses and all orders for this visit:    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  He is wearing a holter monitor.  He is in nsr now.  Vision is at decreased for some numbers when he tries to read on a page.  He is worried about uncertainty of the severity of this event.      Regular exercise    The following health maintenance items are  reviewed in Epic and correct as of today:  Health Maintenance   Topic Date Due     COLON CANCER SCREEN (SYSTEM ASSIGNED)  11/06/2016     INFLUENZA VACCINE (SYSTEM ASSIGNED)  09/01/2017     ADVANCE DIRECTIVE PLANNING Q5 YRS (NO INBASKET)  01/22/2020     LIPID SCREEN Q5 YR MALE (SYSTEM ASSIGNED)  05/18/2022     TETANUS IMMUNIZATION (SYSTEM ASSIGNED)  12/31/2022     HEPATITIS C SCREENING  Completed       Jarret Tay MD  [facility]  Family Practice  [facility]  [phone]    For any issues my office # is [phone]

## 2017-05-23 NOTE — MR AVS SNAPSHOT
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 (H)    -  1       Follow-ups after your visit        Your next 10 appointments already scheduled     Jun 08, 2017  9:30 AM CDT   L/Vision Eval with Essie Maria OT   [facility] [place], Occupational Therapy, Vision - Outpatie ([facility], [facility])    [address]  [address], [address]  [facility] [address] [phone]              Who to contact     If you have questions or need follow up information about today's clinic visit or your schedule please contact [facility] directly at [phone].  Normal or non-critical lab and imaging results will be communicated to you by [employer], letter or phone within 4 business days after the clinic has received the results. If you do not hear from us within 7 days, please contact the clinic through [employer] or phone. If you have a critical or abnormal lab result, we will notify you by phone as soon as possible.  Submit refill requests through [employer] or call your pharmacy and they will forward the refill request to us. Please allow 3 business days for your refill to be completed.          Additional Information About Your Visit        [employer] Information     [employer] lets you send messages to your doctor, view your test results, renew your prescriptions, schedule appointments and more. To sign up, go to [email or web address] . Click on \"Log in\" on the left side of the screen, which will take you to the Welcome page. Then click on \"Sign up Now\" on the right side of the page.     You will be asked to enter the access code listed below, as well " as some personal information. Please follow the directions to create your username and password.     Your access code is: [email or web address]  Expires: 2017  2:14 PM     Your access code will  in 90 days. If you need help or a new code, please call your [facility] clinic or [phone].        Care EveryWhere ID     This is your Care EveryWhere ID. This could be used by other organizations to access your [facility] medical records  [id number]         Blood Pressure from Last 3 Encounters:   17 133/89   17 136/82   17 138/88    Weight from Last 3 Encounters:   17 76.7 kg (169 lb)   17 76.1 kg (167 lb 12.8 oz)   17 76.7 kg (169 lb)              Today, you had the following     No orders found for display       Primary Care Provider Office Phone # Fax #    Jarret Tay [phone]       [facility] [address] [facility] [address]        Thank you!     Thank you for choosing [facility]  for your care. Our goal is always to provide you with excellent care. Hearing back from our patients is one way we can continue to improve our services. Please take a few minutes to complete the written survey that you may receive in the mail after your visit with us. Thank you!             Your Updated Medication List - Protect others around you: Learn how to safely use, store and throw away your medicines at www.disposemymeds.org.          This list is accurate as of: 17  8:43 PM.  Always use your most recent med list.                   Brand Name Dispense Instructions for use    aspirin 325 MG EC tablet     60 tablet    Take 1 tablet (325 mg) by mouth daily       cholecalciferol 2000 UNITS tablet     30 tablet    Take 2,000 Units by mouth daily       fenofibrate 145 MG tablet     90 tablet    Take 1 tablet (145 mg) by mouth daily       FEXOFENADINE HCL PO      Take 180 mg by mouth daily       hydrocortisone 2.5 % cream    PROCTOSOL HC     28.35 g    place rectally twice daily as directed.       lisinopril 40 MG tablet    PRINIVIL/ZESTRIL    30 tablet    Take 1 tablet (40 mg) by mouth daily       OPCON-A OP      Place 1-2 drops into both eyes daily as needed (allergies)       PROBIOTIC ACIDOPHILUS PO      Take 100 mg by mouth 2 times daily as needed       triamcinolone 0.1 % cream    KENALOG    80 g    Apply sparingly to affected area three times daily as needed

## 2017-05-26 ENCOUNTER — TELEPHONE (OUTPATIENT)
Dept: FAMILY MEDICINE | Facility: CLINIC | Age: 62
End: 2017-05-26

## 2017-05-26 DIAGNOSIS — I63.9 CEREBROVASCULAR ACCIDENT (CVA), UNSPECIFIED MECHANISM (H): ICD-10-CM

## 2017-06-01 RX ORDER — LISINOPRIL 40 MG/1
20 TABLET ORAL DAILY
Qty: 30 TABLET | Refills: 0
Start: 2017-05-26 | End: 2018-01-22 | Stop reason: DRUGHIGH

## 2017-06-01 NOTE — TELEPHONE ENCOUNTER
5/26/17 patient calls reporting dizzy episodes in am after meds, breakfast and minor stretching exercises. Symptoms last ~1-2 hours. Not experiencing symptoms at any other time during the day. Patient is concerned it may be due to his lisinopril which was increased from 10mg to 40 mg during recent hospitalization for stroke.   Increase was made on 5/18 with BP's noted at ~140 systolic but high 90's and low 100 systolic.  BP's next day were improved.   Patient does not have any home BP's to offer at this time.   Plan: ok per Dr. Díaz (oncall for Dr. Tay) to decrease lisinopril to 20mg qd and monitor BP and bring log with to next appt with Dr. Tay. Call sooner prn.  Patient agrees.  Kyleigh Taylor RN

## 2017-06-15 DIAGNOSIS — E55.9 VITAMIN D DEFICIENCY: ICD-10-CM

## 2017-06-15 DIAGNOSIS — I63.9 CEREBROVASCULAR ACCIDENT (CVA), UNSPECIFIED MECHANISM (H): ICD-10-CM

## 2017-06-15 RX ORDER — LISINOPRIL 40 MG/1
TABLET ORAL
Qty: 30 TABLET | Refills: 0 | Status: SHIPPED | OUTPATIENT
Start: 2017-06-15 | End: 2018-01-22 | Stop reason: DRUGHIGH

## 2017-06-15 RX ORDER — CHOLECALCIFEROL (VITAMIN D3) 50 MCG
TABLET ORAL
Qty: 30 TABLET | Refills: 0 | Status: SHIPPED | OUTPATIENT
Start: 2017-06-15 | End: 2020-01-27 | Stop reason: DRUGHIGH

## 2017-06-22 ENCOUNTER — TRANSFERRED RECORDS (OUTPATIENT)
Dept: FAMILY MEDICINE | Facility: CLINIC | Age: 62
End: 2017-06-22

## 2017-07-25 ENCOUNTER — TRANSFERRED RECORDS (OUTPATIENT)
Dept: FAMILY MEDICINE | Facility: CLINIC | Age: 62
End: 2017-07-25

## 2017-11-27 ENCOUNTER — TRANSFERRED RECORDS (OUTPATIENT)
Dept: FAMILY MEDICINE | Facility: CLINIC | Age: 62
End: 2017-11-27

## 2017-12-08 DIAGNOSIS — Z23 NEED FOR PROPHYLACTIC VACCINATION AND INOCULATION AGAINST INFLUENZA: Primary | ICD-10-CM

## 2017-12-08 PROCEDURE — 90471 IMMUNIZATION ADMIN: CPT | Performed by: FAMILY MEDICINE

## 2017-12-08 PROCEDURE — 90686 IIV4 VACC NO PRSV 0.5 ML IM: CPT | Performed by: FAMILY MEDICINE

## 2017-12-08 NOTE — PROGRESS NOTES

## 2018-01-15 DIAGNOSIS — I63.9 STROKE (CEREBRUM) (H): ICD-10-CM

## 2018-01-15 DIAGNOSIS — Z80.42 FAMILY HISTORY OF PROSTATE CANCER: ICD-10-CM

## 2018-01-15 DIAGNOSIS — E78.00 HYPERCHOLESTEREMIA: Primary | ICD-10-CM

## 2018-01-15 PROCEDURE — 84153 ASSAY OF PSA TOTAL: CPT | Mod: 90 | Performed by: FAMILY MEDICINE

## 2018-01-15 PROCEDURE — 80053 COMPREHEN METABOLIC PANEL: CPT | Mod: 90 | Performed by: FAMILY MEDICINE

## 2018-01-15 PROCEDURE — 80061 LIPID PANEL: CPT | Mod: 90 | Performed by: FAMILY MEDICINE

## 2018-01-15 PROCEDURE — 36415 COLL VENOUS BLD VENIPUNCTURE: CPT | Performed by: FAMILY MEDICINE

## 2018-01-15 NOTE — LETTER
[facility] [address] [place]  [address]  Phone: [phone]    January 16, 2018      Aiden Kaurkojo  [address]    [place] [address]              Dear Aiden,    I am writing to report that your included test results are within expected ranges. I do not suggest that we make any changes at this time.        Sincerely,     Jarret Tay M.D.    Results for orders placed or performed in visit on 01/15/18   Lipid Panel ([facility])   Result Value Ref Range    Cholesterol 175 100 - 199 mg/dL    Triglycerides 82 0 - 149 mg/dL    HDL Cholesterol 51 >39 mg/dL    VLDL Cholesterol George 16 5 - 40 mg/dL    LDL Cholesterol Calculated 108 (H) 0 - 99 mg/dL    LDL/HDL Ratio 2.1 0.0 - 3.6 ratio units    Narrative    Performed at:  01  [facility] [address], [place], [place]  [id number]  : Tim Cardenas MD, Phone:  [phone]   PSA Serum ([facility])   Result Value Ref Range    PSA NG/ML 3.5 0.0 - 4.0 ng/mL    Narrative    Performed at:  01 - [facility] [address], [place], [place]  [id number]  : Tim Cardenas MD, Phone:  [phone]   Comp. Metabolic Panel (14) ([facility])   Result Value Ref Range    Glucose 99 65 - 99 mg/dL    Urea Nitrogen 21 8 - 27 mg/dL    Creatinine 0.99 0.76 - 1.27 mg/dL    eGFR If NonAfricn Am 80 >59 mL/min/1.73    eGFR If Africn Am 93 >59 mL/min/1.73    BUN/Creatinine Ratio 21 10 - 24    Sodium 141 134 - 144 mmol/L    Potassium 5.2 3.5 - 5.2 mmol/L    Chloride 101 96 - 106 mmol/L    Total CO2 28 18 - 28 mmol/L    Calcium 9.7 8.6 - 10.2 mg/dL    Protein Total 7.1 6.0 - 8.5 g/dL    Albumin 4.4 3.6 - 4.8 g/dL    Globulin, Total 2.7 1.5 - 4.5 g/dL    A/G Ratio 1.6 1.2 - 2.2    Bilirubin Total 0.3 0.0 - 1.2 mg/dL    Alkaline Phosphatase 40 39 - 117 IU/L    AST 15 0 - 40 IU/L    ALT 12 0 - 44 IU/L    Narrative    Performed at:  01 - [facility] [address], [place], [place]  [id number]  : Tim Cardenas MD,  Phone:  [phone]

## 2018-01-16 LAB
ALBUMIN SERPL-MCNC: 4.4 G/DL (ref 3.6–4.8)
ALBUMIN/GLOB SERPL: 1.6 {RATIO} (ref 1.2–2.2)
ALP SERPL-CCNC: 40 IU/L (ref 39–117)
ALT SERPL-CCNC: 12 IU/L (ref 0–44)
AST SERPL-CCNC: 15 IU/L (ref 0–40)
BILIRUB SERPL-MCNC: 0.3 MG/DL (ref 0–1.2)
BUN SERPL-MCNC: 21 MG/DL (ref 8–27)
BUN/CREATININE RATIO: 21 (ref 10–24)
CALCIUM SERPL-MCNC: 9.7 MG/DL (ref 8.6–10.2)
CHLORIDE SERPLBLD-SCNC: 101 MMOL/L (ref 96–106)
CHOLEST SERPL-MCNC: 175 MG/DL (ref 100–199)
CREAT SERPL-MCNC: 0.99 MG/DL (ref 0.76–1.27)
EGFR IF AFRICN AM: 93 ML/MIN/1.73
EGFR IF NONAFRICN AM: 80 ML/MIN/1.73
GLOBULIN, TOTAL: 2.7 G/DL (ref 1.5–4.5)
GLUCOSE SERPL-MCNC: 99 MG/DL (ref 65–99)
HDLC SERPL-MCNC: 51 MG/DL
LDL/HDL RATIO: 2.1 RATIO UNITS (ref 0–3.6)
LDLC SERPL CALC-MCNC: 108 MG/DL (ref 0–99)
POTASSIUM SERPL-SCNC: 5.2 MMOL/L (ref 3.5–5.2)
PROT SERPL-MCNC: 7.1 G/DL (ref 6–8.5)
PSA NG/ML: 3.5 NG/ML (ref 0–4)
SODIUM SERPL-SCNC: 141 MMOL/L (ref 134–144)
TOTAL CO2: 28 MMOL/L (ref 18–28)
TRIGL SERPL-MCNC: 82 MG/DL (ref 0–149)
VLDLC SERPL CALC-MCNC: 16 MG/DL (ref 5–40)

## 2018-01-16 NOTE — PROGRESS NOTES
Dear Aiden,   I am writing to report that your included test results are within expected ranges. I do not suggest that we make any changes at this time.    Jarret Tay M.D.

## 2018-01-22 ENCOUNTER — OFFICE VISIT (OUTPATIENT)
Dept: FAMILY MEDICINE | Facility: CLINIC | Age: 63
End: 2018-01-22

## 2018-01-22 VITALS
HEART RATE: 68 BPM | DIASTOLIC BLOOD PRESSURE: 74 MMHG | BODY MASS INDEX: 24.59 KG/M2 | HEIGHT: 66 IN | WEIGHT: 153 LBS | SYSTOLIC BLOOD PRESSURE: 110 MMHG

## 2018-01-22 DIAGNOSIS — I10 ESSENTIAL HYPERTENSION: ICD-10-CM

## 2018-01-22 DIAGNOSIS — I63.442 CEREBROVASCULAR ACCIDENT (CVA) DUE TO EMBOLISM OF LEFT CEREBELLAR ARTERY (H): ICD-10-CM

## 2018-01-22 DIAGNOSIS — E78.00 HYPERCHOLESTEREMIA: ICD-10-CM

## 2018-01-22 DIAGNOSIS — L65.9 HAIR LOSS: ICD-10-CM

## 2018-01-22 DIAGNOSIS — Z12.11 SPECIAL SCREENING FOR MALIGNANT NEOPLASMS, COLON: ICD-10-CM

## 2018-01-22 DIAGNOSIS — R21 RASH: ICD-10-CM

## 2018-01-22 DIAGNOSIS — Z00.00 PREVENTATIVE HEALTH CARE: Primary | ICD-10-CM

## 2018-01-22 LAB
% GRANULOCYTES: 73.4 % (ref 42.2–75.2)
HCT VFR BLD AUTO: 44.3 % (ref 39–51)
HEMOGLOBIN: 14.4 G/DL (ref 13.4–17.5)
LYMPHOCYTES NFR BLD AUTO: 20 % (ref 20.5–51.1)
MCH RBC QN AUTO: 29.4 PG (ref 27–31)
MCHC RBC AUTO-ENTMCNC: 32.6 G/DL (ref 33–37)
MCV RBC AUTO: 90.1 FL (ref 80–100)
MONOCYTES NFR BLD AUTO: 6.6 % (ref 1.7–9.3)
PLATELET # BLD AUTO: 284 K/UL (ref 140–450)
RBC # BLD AUTO: 4.92 X10/CMM (ref 4.2–5.9)
WBC # BLD AUTO: 5.6 X10/CMM (ref 3.8–11)

## 2018-01-22 PROCEDURE — 92551 PURE TONE HEARING TEST AIR: CPT | Performed by: FAMILY MEDICINE

## 2018-01-22 PROCEDURE — 84443 ASSAY THYROID STIM HORMONE: CPT | Mod: 90 | Performed by: FAMILY MEDICINE

## 2018-01-22 PROCEDURE — 99396 PREV VISIT EST AGE 40-64: CPT | Performed by: FAMILY MEDICINE

## 2018-01-22 PROCEDURE — 36415 COLL VENOUS BLD VENIPUNCTURE: CPT | Performed by: FAMILY MEDICINE

## 2018-01-22 PROCEDURE — 84439 ASSAY OF FREE THYROXINE: CPT | Mod: 90 | Performed by: FAMILY MEDICINE

## 2018-01-22 PROCEDURE — 85025 COMPLETE CBC W/AUTO DIFF WBC: CPT | Performed by: FAMILY MEDICINE

## 2018-01-22 PROCEDURE — 99213 OFFICE O/P EST LOW 20 MIN: CPT | Mod: 25 | Performed by: FAMILY MEDICINE

## 2018-01-22 RX ORDER — LISINOPRIL 10 MG/1
10 TABLET ORAL DAILY
Refills: 3 | COMMUNITY
Start: 2017-12-04 | End: 2018-05-07

## 2018-01-22 RX ORDER — TRIAMCINOLONE ACETONIDE 1 MG/G
CREAM TOPICAL
Qty: 80 G | Refills: 1 | Status: SHIPPED | OUTPATIENT
Start: 2018-01-22 | End: 2019-06-25

## 2018-01-22 NOTE — MR AVS SNAPSHOT
"              After Visit Summary   1/22/2018    Aiden Valdez    MRN: [id number]           Patient Information     Date Of Birth          1955        Visit Information        Provider Department      1/22/2018 7:45 AM Jarret Tay MD [facility]        Today's Diagnoses     Hypercholesteremia    -  1    Preventative health care        Essential hypertension        Cerebrovascular accident (CVA) due to embolism of left cerebellar artery (H)        Special screening for malignant neoplasms, colon        Rash        Hair loss          Care Instructions    Use antifungal powder on the head of the penis after drying carefully    Thanks for coming in to see me today!    Your next visit with us should be scheduled for Follow up in 4 months    Listed next are the medical health Maintenance items we are tracking for your care and when they are next due (or overdue!)  Our goal is 100% \"up to date.\" Keep this list handy to call and schedule what you are due for in the future!    Health Maintenance   Topic Date Due     COLON CANCER SCREEN (SYSTEM ASSIGNED)  11/06/2016     ADVANCE DIRECTIVE PLANNING Q5 YRS  01/22/2020     TETANUS IMMUNIZATION (SYSTEM ASSIGNED)  12/31/2022     LIPID SCREEN Q5 YR MALE (SYSTEM ASSIGNED)  01/15/2023     INFLUENZA VACCINE (SYSTEM ASSIGNED)  Completed     HEPATITIS C SCREENING  Completed         CeraVe Is the best moisturizer I have found, while it is expensive, it works well.   Apply after bathing.    Preventive Health Recommendations  Male Ages 50 - 64    Yearly exam:             See your health care provider every year in order to  o   Review health changes.   o   Discuss preventive care.    o   Review your medicines if your doctor has prescribed any.     Have a cholesterol test every 5 years, or more frequently if you are at risk for high cholesterol/heart disease.     Have a diabetes test (fasting glucose) every three years. If you are at risk for diabetes, you should " have this test more often.     Have a colonoscopy at age 50, or have a yearly FIT test (stool test). These exams will check for colon cancer.      Talk with your health care provider about whether or not a prostate cancer screening test (PSA) is right for you.    You should be tested each year for STDs (sexually transmitted diseases), if you re at risk.     Shots: Get a flu shot each year. Get a tetanus shot every 10 years.     Nutrition:    Eat at least 5 servings of fruits and vegetables daily.     Eat whole-grain bread, whole-wheat pasta and brown rice instead of white grains and rice.     Talk to your provider about Calcium and Vitamin D.     Lifestyle    Exercise for at least 150 minutes a week (30 minutes a day, 5 days a week). This will help you control your weight and prevent disease.     Limit alcohol to one drink per day.     No smoking.     Wear sunscreen to prevent skin cancer.     See your dentist every six months for an exam and cleaning.     See your eye doctor every 1 to 2 years.            Follow-ups after your visit        Additional Services     GASTROENTEROLOGY ADULT REF PROCEDURE ONLY       Last Lab Result: Creatinine (mg/dL)       Date                     Value                 01/15/2018               0.99             ----------  There is no height or weight on file to calculate BMI.     Needed:  No  Language:  English    Patient will be contacted to schedule procedure.     Please be aware that coverage of these services is subject to the terms and limitations of your health insurance plan.  Call member services at your health plan with any benefit or coverage questions.  Any procedures must be performed at a [facility] facility OR coordinated by your clinic's referral office.    Please bring the following with you to your appointment:    (1) Any X-Rays, CTs or MRIs which have been performed.  Contact the facility where they were done to arrange for  prior to your scheduled  "appointment.    (2) List of current medications   (3) This referral request   (4) Any documents/labs given to you for this referral                  Who to contact     If you have questions or need follow up information about today's clinic visit or your schedule please contact [facility] directly at [phone].  Normal or non-critical lab and imaging results will be communicated to you by MyChart, letter or phone within 4 business days after the clinic has received the results. If you do not hear from us within 7 days, please contact the clinic through [employer] or phone. If you have a critical or abnormal lab result, we will notify you by phone as soon as possible.  Submit refill requests through [employer] or call your pharmacy and they will forward the refill request to us. Please allow 3 business days for your refill to be completed.          Additional Information About Your Visit        MyChart Information     [employer] lets you send messages to your doctor, view your test results, renew your prescriptions, schedule appointments and more. To sign up, go to [email or web address] . Click on \"Log in\" on the left side of the screen, which will take you to the Welcome page. Then click on \"Sign up Now\" on the right side of the page.     You will be asked to enter the access code listed below, as well as some personal information. Please follow the directions to create your username and password.     Your access code is: [email or web address]  Expires: 2018  8:29 AM     Your access code will  in 90 days. If you need help or a new code, please call your [facility] clinic or [phone].        Care EveryWhere ID     This is your Care EveryWhere ID. This could be used by other organizations to access your [facility] medical records  [id number]        Your Vitals Were     Pulse Height BMI (Body Mass Index)             68 1.683 m (5' 6.25\") 24.51 kg/m2          Blood Pressure from Last 3 Encounters: "   01/22/18 110/74   05/19/17 133/89   05/11/17 136/82    Weight from Last 3 Encounters:   01/22/18 69.4 kg (153 lb)   05/17/17 76.7 kg (169 lb)   05/11/17 76.1 kg (167 lb 12.8 oz)              We Performed the Following     CBC with Diff/Plt ([facility])     GASTROENTEROLOGY ADULT REF PROCEDURE ONLY     SCREENING TEST, PURE TONE, AIR ONLY     Thyroxine (T4) Free  Direct  S ([facility])     TSH ([facility])          Today's Medication Changes          These changes are accurate as of: 1/22/18  8:29 AM.  If you have any questions, ask your nurse or doctor.               Start taking these medicines.        Dose/Directions    STATIN NOT PRESCRIBED (INTENTIONAL)   Used for:  Hypercholesteremia, Cerebrovascular accident (CVA) due to embolism of left cerebellar artery (H)   Started by:  Jarret Tay MD        Dose:  1 each   1 each daily Please choose reason not prescribed, below   Refills:  0         These medicines have changed or have updated prescriptions.        Dose/Directions    lisinopril 10 MG tablet   Commonly known as:  PRINIVIL/ZESTRIL   This may have changed:  Another medication with the same name was removed. Continue taking this medication, and follow the directions you see here.   Changed by:  Jarret Tay MD        Dose:  10 mg   Take 10 mg by mouth daily   Refills:  3       * triamcinolone 0.1 % cream   Commonly known as:  KENALOG   This may have changed:  Another medication with the same name was added. Make sure you understand how and when to take each.   Used for:  Encounter for medication refill   Changed by:  Jarret Tay MD        Apply sparingly to affected area three times daily as needed   Quantity:  80 g   Refills:  0       * triamcinolone 0.1 % cream   Commonly known as:  KENALOG   This may have changed:  You were already taking a medication with the same name, and this prescription was added. Make sure you understand how and when to take each.   Used for:  Rash   Changed by:   Jarret Tay MD        Apply  topically 2 times daily as needed. Apply sparingly to affected area.   Quantity:  80 g   Refills:  1       * Notice:  This list has 2 medication(s) that are the same as other medications prescribed for you. Read the directions carefully, and ask your doctor or other care provider to review them with you.         Where to get your medicines      These medications were sent to [place] [address] [place] [facility] [address]  [address], [place] [address]     Phone:  [phone]     triamcinolone 0.1 % cream         Some of these will need a paper prescription and others can be bought over the counter.  Ask your nurse if you have questions.     You don't need a prescription for these medications     STATIN NOT PRESCRIBED (INTENTIONAL)                Primary Care Provider Office Phone # Fax #    Jarret Tay [phone] [address] [place] [address]        Equal Access to Services     BLAISE LYMAN AH: Hadii aad ku hadasho Soomaali, waaxda luqadaha, qaybta kaalmada adeegyada, waxay idiin hayaan christophe kharabogdan carrera . So [facility] [phone].    ATENCIÓN: Si habla español, tiene a lee disposición servicios gratuitos de asistencia lingüística. Llame al [phone].    We comply with applicable federal civil rights laws and [place] laws. We do not discriminate on the basis of race, color, national origin, age, disability, sex, sexual orientation, or gender identity.            Thank you!     Thank you for choosing [facility]  for your care. Our goal is always to provide you with excellent care. Hearing back from our patients is one way we can continue to improve our services. Please take a few minutes to complete the written survey that you may receive in the mail after your visit with us. Thank you!             Your Updated Medication List - Protect others around you: Learn how to safely use, store and throw  away your medicines at www.disposemymeds.org.          This list is accurate as of: 1/22/18  8:29 AM.  Always use your most recent med list.                   Brand Name Dispense Instructions for use Diagnosis    aspirin 325 MG EC tablet     60 tablet    Take 1 tablet (325 mg) by mouth daily    Cerebrovascular accident (CVA), unspecified mechanism (H)       fenofibrate 145 MG tablet     90 tablet    Take 1 tablet (145 mg) by mouth daily    Hypercholesteremia       FEXOFENADINE HCL PO      Take 180 mg by mouth daily        hydrocortisone 2.5 % cream    PROCTOSOL HC    28.35 g    place rectally twice daily as directed.    Preventative health care       lisinopril 10 MG tablet    PRINIVIL/ZESTRIL     Take 10 mg by mouth daily        OPCON-A OP      Place 1-2 drops into both eyes daily as needed (allergies)        PROBIOTIC ACIDOPHILUS PO      Take 100 mg by mouth 2 times daily as needed        STATIN NOT PRESCRIBED (INTENTIONAL)      1 each daily Please choose reason not prescribed, below    Hypercholesteremia, Cerebrovascular accident (CVA) due to embolism of left cerebellar artery (H)       * triamcinolone 0.1 % cream    KENALOG    80 g    Apply sparingly to affected area three times daily as needed    Encounter for medication refill       * triamcinolone 0.1 % cream    KENALOG    80 g    Apply  topically 2 times daily as needed. Apply sparingly to affected area.    Rash       vitamin D 2000 UNITS tablet     30 tablet    TAKE 1 TABLET BY MOUTH EVERY DAY    Vitamin D deficiency       * Notice:  This list has 2 medication(s) that are the same as other medications prescribed for you. Read the directions carefully, and ask your doctor or other care provider to review them with you.

## 2018-01-22 NOTE — LETTER
[place] [id number] [place]  [address]  Phone: [phone]    January 25, 2018      Aiden Valdez  [address]    [place] [address]              Dear Aiden,      I am writing to report that your included test results are within expected ranges.   I do not suggest that we make any changes at this time.    Sincerely,     Jarret Tay M.D.    Results for orders placed or performed in visit on 01/22/18   TSH ([facility])   Result Value Ref Range    TSH 0.860 0.450 - 4.500 uIU/mL    Narrative    Performed at:  01 - [facility] [address], [place], [place]  [id number]  : Tim Cardenas MD, Phone:  [phone]   Thyroxine (T4) Free  Direct  S ([facility])   Result Value Ref Range    T4 Free 1.37 0.82 - 1.77 ng/dL    Narrative    Performed at:  01 - [facility] [address], [place], [place]  [id number]  : Tim Cardenas MD, Phone:  [phone]   CBC with Diff/Plt ([facility])   Result Value Ref Range    WBC x10/cmm 5.6 3.8 - 11.0 x10/cmm    % Lymphocytes 20.0 (A) 20.5 - 51.1 %    % Monocytes 6.6 1.7 - 9.3 %    % Granulocytes 73.4 42.2 - 75.2 %    RBC x10/cmm 4.92 4.2 - 5.9 x10/cmm    Hemoglobin 14.4 13.4 - 17.5 g/dl    Hematocrit 44.3 39 - 51 %    MCV 90.1 80 - 100 fL    MCH 29.4 27.0 - 31.0 pg    MCHC 32.6 (A) 33.0 - 37.0 g/dL    Platelet Count 284 140 - 450 K/uL

## 2018-01-22 NOTE — PATIENT INSTRUCTIONS
"Use antifungal powder on the head of the penis after drying carefully    Thanks for coming in to see me today!    Your next visit with us should be scheduled for Follow up in 4 months    Listed next are the medical health Maintenance items we are tracking for your care and when they are next due (or overdue!)  Our goal is 100% \"up to date.\" Keep this list handy to call and schedule what you are due for in the future!    Health Maintenance   Topic Date Due     COLON CANCER SCREEN (SYSTEM ASSIGNED)  11/06/2016     ADVANCE DIRECTIVE PLANNING Q5 YRS  01/22/2020     TETANUS IMMUNIZATION (SYSTEM ASSIGNED)  12/31/2022     LIPID SCREEN Q5 YR MALE (SYSTEM ASSIGNED)  01/15/2023     INFLUENZA VACCINE (SYSTEM ASSIGNED)  Completed     HEPATITIS C SCREENING  Completed         CeraVe Is the best moisturizer I have found, while it is expensive, it works well.   Apply after bathing.    Preventive Health Recommendations  Male Ages 50   64    Yearly exam:             See your health care provider every year in order to  o   Review health changes.   o   Discuss preventive care.    o   Review your medicines if your doctor has prescribed any.     Have a cholesterol test every 5 years, or more frequently if you are at risk for high cholesterol/heart disease.     Have a diabetes test (fasting glucose) every three years. If you are at risk for diabetes, you should have this test more often.     Have a colonoscopy at age 50, or have a yearly FIT test (stool test). These exams will check for colon cancer.      Talk with your health care provider about whether or not a prostate cancer screening test (PSA) is right for you.    You should be tested each year for STDs (sexually transmitted diseases), if you re at risk.     Shots: Get a flu shot each year. Get a tetanus shot every 10 years.     Nutrition:    Eat at least 5 servings of fruits and vegetables daily.     Eat whole-grain bread, whole-wheat pasta and brown rice instead of white grains " and rice.     Talk to your provider about Calcium and Vitamin D.     Lifestyle    Exercise for at least 150 minutes a week (30 minutes a day, 5 days a week). This will help you control your weight and prevent disease.     Limit alcohol to one drink per day.     No smoking.     Wear sunscreen to prevent skin cancer.     See your dentist every six months for an exam and cleaning.     See your eye doctor every 1 to 2 years.

## 2018-01-22 NOTE — NURSING NOTE
Patient states that he has had significant weight loss as well as hair loss since May 2017. Cleveland Lainez LPN      1/22/2018    7:50 AM      HEARING FREQUENCY:   Right Ear:  500 Hz: PASS   1000 Hz: PASS   2000 Hz: PASS   4000 Hz: PASS  Left Ear:  500 Hz: PASS   1000 Hz: PASS   2000 Hz: PASS   4000 Hz: PASS  Cleveland Lainez LPN 10/18/2017 11:56 AM

## 2018-01-22 NOTE — PROGRESS NOTES
SUBJECTIVE:   CC: Aiden Valdez is an 62 year old male who presents for preventative health visit.     Healthy Habits:    Do you get at least three servings of calcium containing foods daily (dairy, green leafy vegetables, etc.)? yes    Amount of exercise or daily activities, outside of work: 6 day(s) per week    Problems taking medications regularly No    Medication side effects: maybe    Have you had an eye exam in the past two years? yes    Do you see a dentist twice per year? yes    Do you have sleep apnea, excessive snoring or daytime drowsiness?no     Nursing Notes:   Cleveland Lainez LPN  1/22/2018  7:50 AM  Signed  Patient states that he has had significant weight loss as well as hair loss since May 2017. Cleveland Lainez LPN      1/22/2018    7:50 AM      HEARING FREQUENCY:   Right Ear:  500 Hz: PASS   1000 Hz: PASS   2000 Hz: PASS   4000 Hz: PASS  Left Ear:  500 Hz: PASS   1000 Hz: PASS   2000 Hz: PASS   4000 Hz: PASS  Cleveland Lainez LPN 10/18/2017 11:56 AM        PROBLEMS TO ADD ON...weight loss & hair loss htn and stroke follow up    Stroke,  Has greatly changed his diet and lost 15 pounds,  He is unable to tolerated statin, tried 4 and had severe leg pain.s  See note from dr. Goyal. Cardiac work up neg.  He had quite severe hyperchol previously  Now on an asa.    Has history of hyperlipidemia.  Not in the past on statin for this, but reports excellent dietary compliance with a low chol diet.  Interested in knowing where he is at today.  Latest labs reviewed:  Recent Labs   Lab Test  01/15/18   0824  05/18/17   0820   CHOL  175  222*   HDL  51  51   LDL  108*  141*   TRIG  82  151*      Feels he is losing hair compared.    Blood presure remains well controlled when checked out of clinic.    Reviewed last 6 BP readings in chart:  BP Readings from Last 6 Encounters:   01/22/18 110/74   05/19/17 133/89   05/11/17 136/82   05/08/17 138/88   02/09/17 128/82   10/06/16 138/88       he has not  experienced any significant side effects from medications for hypertension.    NO active cardiac complaints or symptoms with exercise.      Today's PHQ-2 Score:   PHQ-2 ( 1999 Pfizer) 1/22/2018 2/9/2017   Q1: Little interest or pleasure in doing things 0 1   Q2: Feeling down, depressed or hopeless 0 0   PHQ-2 Score 0 1         Abuse: Current or Past(Physical, Sexual or Emotional)- No  Do you feel safe in your environment - Yes  Social History   Substance Use Topics     Smoking status: Never Smoker     Smokeless tobacco: Never Used     Alcohol use Yes      Comment: 1-3 week      If you drink alcohol do you typically have >3 drinks per day or >7 drinks per week? No                      Last PSA: No results found for: PSA    Reviewed orders with patient. Reviewed health maintenance and updated orders accordingly - Yes  Labs reviewed in EPIC  BP Readings from Last 3 Encounters:   01/22/18 110/74   05/19/17 133/89   05/11/17 136/82    Wt Readings from Last 3 Encounters:   01/22/18 69.4 kg (153 lb)   05/17/17 76.7 kg (169 lb)   05/11/17 76.1 kg (167 lb 12.8 oz)                  Patient Active Problem List   Diagnosis     Hypercholesteremia     Advanced directives, counseling/discussion     Family history of prostate cancer     Essential hypertension     Headache, unspecified headache type     Stroke (cerebrum) (H)     Stroke (H)     Past Surgical History:   Procedure Laterality Date     EXTRACAPSULAR CATARACT EXTRATION WITH INTRAOCULAR LENS IMPLANT       PROSTATE BIOPSY, NEEDLE, SATURATION SAMPLING         Social History   Substance Use Topics     Smoking status: Never Smoker     Smokeless tobacco: Never Used     Alcohol use Yes      Comment: 1-3 week     Family History   Problem Relation Age of Onset     Respiratory Father      C.A.D. Mother      CEREBROVASCULAR DISEASE Mother      Hypertension Mother      Prostate Cancer Brother          Current Outpatient Prescriptions   Medication Sig Dispense Refill     lisinopril  (PRINIVIL/ZESTRIL) 10 MG tablet Take 10 mg by mouth daily  3     STATIN NOT PRESCRIBED, INTENTIONAL, 1 each daily Please choose reason not prescribed, below       triamcinolone (KENALOG) 0.1 % cream Apply  topically 2 times daily as needed. Apply sparingly to affected area. 80 g 1     Cholecalciferol (VITAMIN D) 2000 UNITS tablet TAKE 1 TABLET BY MOUTH EVERY DAY 30 tablet 0     aspirin  MG EC tablet Take 1 tablet (325 mg) by mouth daily 60 tablet 0     Lactobacillus (PROBIOTIC ACIDOPHILUS PO) Take 100 mg by mouth 2 times daily as needed       Naphazoline-Pheniramine (OPCON-A OP) Place 1-2 drops into both eyes daily as needed (allergies)        fenofibrate 145 MG tablet Take 1 tablet (145 mg) by mouth daily 90 tablet 3     triamcinolone (KENALOG) 0.1 % cream Apply sparingly to affected area three times daily as needed 80 g 0     FEXOFENADINE HCL PO Take 180 mg by mouth daily       hydrocortisone (PROCTOSOL HC) 2.5 % rectal cream place rectally twice daily as directed. 28.35 g 0     [DISCONTINUED] lisinopril (PRINIVIL/ZESTRIL) 40 MG tablet TAKE 1 TABLET BY MOUTH DAILY 30 tablet 0     [DISCONTINUED] lisinopril (PRINIVIL/ZESTRIL) 40 MG tablet Take 0.5 tablets (20 mg) by mouth daily 30 tablet 0     Allergies   Allergen Reactions     Crestor [Rosuvastatin] Other (See Comments)     Muscle ache     Simvastatin Other (See Comments)     Muscle ache     Atorvastatin Other (See Comments)     Muscles ache     Recent Labs   Lab Test  01/15/18   0824  05/19/17   0730  05/18/17   0820  05/17/17   2225  02/01/17   0910   A1C   --    --   5.7   --    --    LDL  108*   --   141*   --   142*   HDL  51   --   51   --   50   TRIG  82   --   151*   --   91   ALT  12   --    --   21  16   CR  0.99  0.79   --   0.79  0.94   GFRESTIMATED   --   >90  Non  GFR Calc     --   >90  Non  GFR Calc     --    GFRESTBLACK   --   >90   GFR Calc     --   >90   GFR Calc     --   "  POTASSIUM  5.2  4.1   --   3.8  5.0   TSH   --    --   1.48   --    --               Reviewed and updated as needed this visit by clinical staffTobacco  Allergies  Meds  Med Hx  Surg Hx  Fam Hx  Soc Hx        Reviewed and updated as needed this visit by Provider        Past Medical History:   Diagnosis Date     Acute arterial ischemic stroke, multifocal, posterior circulation (H) 05/2017    hospitalized [facility].      AR (allergic rhinitis)      HTN (hypertension)      Hypercholesteremia      Periocular dermatitides       Past Surgical History:   Procedure Laterality Date     EXTRACAPSULAR CATARACT EXTRATION WITH INTRAOCULAR LENS IMPLANT       PROSTATE BIOPSY, NEEDLE, SATURATION SAMPLING         ROS:  C: NEGATIVE for fever, chills, change in weight  I: NEGATIVE for worrisome rashes, moles or lesions  E: NEGATIVE for vision changes or irritation  ENT: NEGATIVE for ear, mouth and throat problems  R: NEGATIVE for significant cough or SOB  CV: NEGATIVE for chest pain, palpitations or peripheral edema  GI: NEGATIVE for nausea, abdominal pain, heartburn, or change in bowel habits   male: negative for dysuria, hematuria, decreased urinary stream, erectile dysfunction, urethral discharge  M: NEGATIVE for significant arthralgias or myalgia  N: NEGATIVE for weakness, dizziness or paresthesias  P: NEGATIVE for changes in mood or affect    OBJECTIVE:   /74 (BP Location: Right arm, Patient Position: Sitting, Cuff Size: Adult Regular)  Pulse 68  Ht 1.683 m (5' 6.25\")  Wt 69.4 kg (153 lb)  BMI 24.51 kg/m2  EXAM:  GENERAL: healthy, alert and no distress  EYES: Eyes grossly normal to inspection, PERRL and conjunctivae and sclerae normal  HENT: ear canals and TM's normal, nose and mouth without ulcers or lesions  NECK: no adenopathy, no asymmetry, masses, or scars and thyroid normal to palpation  RESP: lungs clear to auscultation - no rales, rhonchi or wheezes  CV: regular rate and rhythm, normal S1 S2, no S3 or S4, no " murmur, click or rub, no peripheral edema and peripheral pulses strong  ABDOMEN: soft, nontender, no hepatosplenomegaly, no masses and bowel sounds normal   (male): testicles normal without atrophy or masses, no hernias, penis normal without urethral discharge and slight fungal infection under the foreskin  RECTAL: normal sphincter tone, no rectal masses, prostate normal size, smooth, nontender without nodules or masses  MS: no gross musculoskeletal defects noted, no edema  SKIN: no suspicious lesions or rashes  NEURO: Normal strength and tone, mentation intact and speech normal  PSYCH: mentation appears normal, affect normal/bright    ASSESSMENT/PLAN:   Aiden was seen today for physical.    Diagnoses and all orders for this visit:    Hypercholesteremia  -     STATIN NOT PRESCRIBED, INTENTIONAL,; 1 each daily Please choose reason not prescribed, below  Great improvement, cpm    Preventative health care  -     SCREENING TEST, PURE TONE, AIR ONLY    Essential hypertension  -     CBC with Diff/Plt ([facility])  Discussed hypertension in detail including JNC VIII guidelines for blood pressure goals.  Discussed indication for treatment and treatment options.  Discussed the importance for aggressive management of HTN to prevent vascular complications later.  Recommended lower fat, lower carbohydrate, and lower sodium (<2000 mg)diet.  Discussed required intervals for follow up on HTN, lab studies, and the need to aggresive management of other cardiac disease risk factors.  Recommened pt. follow their blood pressures outside the clinic to ensure that BPs are remaining within guidelines, and to contact me if the readings are not within guidelines so we can adjust treatment as needed.    Cerebrovascular accident (CVA) due to embolism of left cerebellar artery (H)  -     STATIN NOT PRESCRIBED, INTENTIONAL,; 1 each daily Please choose reason not prescribed, below  Prevention efforts have been significant continue asa    Special  "screening for malignant neoplasms, colon  -     GASTROENTEROLOGY ADULT REF PROCEDURE ONLY    Rash  -     triamcinolone (KENALOG) 0.1 % cream; Apply  topically 2 times daily as needed. Apply sparingly to affected area.    Hair loss  -     TSH ([facility])  -     Thyroxine (T4) Free  Direct  S ([facility])  Penile fungal rash  Reviewed cure      COUNSELING:  Reviewed preventive health counseling, as reflected in patient instructions       Regular exercise       Healthy diet/nutrition       Colon cancer screening       reports that he has never smoked. He has never used smokeless tobacco.      Estimated body mass index is 24.51 kg/(m^2) as calculated from the following:    Height as of this encounter: 1.683 m (5' 6.25\").    Weight as of this encounter: 69.4 kg (153 lb).         Counseling Resources:  ATP IV Guidelines  Pooled Cohorts Equation Calculator  FRAX Risk Assessment  ICSI Preventive Guidelines  Dietary Guidelines for Americans, 2010  USDA's MyPlate  ASA Prophylaxis  Lung CA Screening    Jarret Tay MD  [facility]  "

## 2018-01-24 LAB
T4 FREE SERPL-MCNC: 1.37 NG/DL (ref 0.82–1.77)
TSH BLD-ACNC: 0.86 UIU/ML (ref 0.45–4.5)

## 2018-02-05 ENCOUNTER — TRANSFERRED RECORDS (OUTPATIENT)
Dept: FAMILY MEDICINE | Facility: CLINIC | Age: 63
End: 2018-02-05

## 2018-03-01 DIAGNOSIS — E78.00 HYPERCHOLESTEREMIA: ICD-10-CM

## 2018-03-01 RX ORDER — FENOFIBRATE 145 MG/1
TABLET, COATED ORAL
Qty: 90 TABLET | Refills: 3 | Status: SHIPPED | OUTPATIENT
Start: 2018-03-01 | End: 2019-01-24

## 2018-03-01 NOTE — TELEPHONE ENCOUNTER
fenofibrate 145 MG    LAST  Med check 1/22/18   last labs(for RX) 1/22/18  Next  appt scheduled =  5/7/18 med ck  Jocelin Ag MA    Recent Labs   Lab Test  01/15/18   0824  05/18/17   0820   CHOL  175  222*   HDL  51  51   LDL  108*  141*   TRIG  82  151*

## 2018-03-18 DIAGNOSIS — Z76.0 ENCOUNTER FOR MEDICATION REFILL: ICD-10-CM

## 2018-03-18 RX ORDER — LISINOPRIL 10 MG/1
TABLET ORAL
Qty: 90 TABLET | Refills: 3 | Status: SHIPPED | OUTPATIENT
Start: 2018-03-18 | End: 2018-05-07

## 2018-04-09 ENCOUNTER — TRANSFERRED RECORDS (OUTPATIENT)
Dept: FAMILY MEDICINE | Facility: CLINIC | Age: 63
End: 2018-04-09

## 2018-05-07 ENCOUNTER — OFFICE VISIT (OUTPATIENT)
Dept: FAMILY MEDICINE | Facility: CLINIC | Age: 63
End: 2018-05-07

## 2018-05-07 VITALS
RESPIRATION RATE: 12 BRPM | SYSTOLIC BLOOD PRESSURE: 112 MMHG | BODY MASS INDEX: 24.44 KG/M2 | WEIGHT: 152.6 LBS | HEART RATE: 68 BPM | DIASTOLIC BLOOD PRESSURE: 68 MMHG

## 2018-05-07 DIAGNOSIS — M54.6 CHRONIC MIDLINE THORACIC BACK PAIN: ICD-10-CM

## 2018-05-07 DIAGNOSIS — G89.29 CHRONIC MIDLINE THORACIC BACK PAIN: ICD-10-CM

## 2018-05-07 DIAGNOSIS — F43.22 ADJUSTMENT DISORDER WITH ANXIOUS MOOD: ICD-10-CM

## 2018-05-07 DIAGNOSIS — I10 ESSENTIAL HYPERTENSION: Primary | ICD-10-CM

## 2018-05-07 DIAGNOSIS — B36.9 FUNGAL DERMATOSIS: ICD-10-CM

## 2018-05-07 PROCEDURE — 99214 OFFICE O/P EST MOD 30 MIN: CPT | Performed by: FAMILY MEDICINE

## 2018-05-07 RX ORDER — LISINOPRIL 5 MG/1
5 TABLET ORAL DAILY
Qty: 90 TABLET | Refills: 3 | Status: SHIPPED | OUTPATIENT
Start: 2018-05-07 | End: 2019-01-24

## 2018-05-07 NOTE — MR AVS SNAPSHOT
"              After Visit Summary   5/7/2018    Aiden Valdez    MRN: [id number]           Patient Information     Date Of Birth          1955        Visit Information        Provider Department      5/7/2018 7:45 AM Jarret Tay MD [facility]        Today's Diagnoses     Essential hypertension    -  1    Chronic midline thoracic back pain        Fungal dermatosis        concern regaurding previous stroke          Care Instructions    .[email or web address]          Follow-ups after your visit        Additional Services     PHYSICAL THERAPY REFERRAL       *This therapy referral will be filtered to a centralized scheduling office at [facility] and the patient will receive a call to schedule an appointment at a [facility] location most convenient for them. *     [facility] provides Physical Therapy evaluation and treatment and many specialty services across the [facility] system.  If requesting a specialty program, please choose from the list below.    If you have not heard from the scheduling office within 2 business days, please call [phone] for all locations, with the exception of [place], please call [phone] and [facility], please call [phone]  Treatment: Evaluation & Treatment  Special Instructions/Modalities:   Special Programs: mid back rehab at [facility] on [facility]    Please be aware that coverage of these services is subject to the terms and limitations of your health insurance plan.  Call member services at your health plan with any benefit or coverage questions.      **Note to Provider:  If you are referring outside of [facility] for the therapy appointment, please list the name of the location in the \"special instructions\" above, print the referral and give to the patient to schedule the appointment.                  Who to contact     If you have questions or need follow up information about today's clinic visit or your schedule " "please contact [facility] directly at [phone].  Normal or non-critical lab and imaging results will be communicated to you by MyChart, letter or phone within 4 business days after the clinic has received the results. If you do not hear from us within 7 days, please contact the clinic through [employer] or phone. If you have a critical or abnormal lab result, we will notify you by phone as soon as possible.  Submit refill requests through [employer] or call your pharmacy and they will forward the refill request to us. Please allow 3 business days for your refill to be completed.          Additional Information About Your Visit        [employer] Information     [employer] lets you send messages to your doctor, view your test results, renew your prescriptions, schedule appointments and more. To sign up, go to [email or web address] . Click on \"Log in\" on the left side of the screen, which will take you to the Welcome page. Then click on \"Sign up Now\" on the right side of the page.     You will be asked to enter the access code listed below, as well as some personal information. Please follow the directions to create your username and password.     Your access code is: [email or web address]  Expires: 2018  8:17 AM     Your access code will  in 90 days. If you need help or a new code, please call your [facility] clinic or [phone].        Care EveryWhere ID     This is your Care EveryWhere ID. This could be used by other organizations to access your [facility] medical records  [id number]        Your Vitals Were     Pulse Respirations BMI (Body Mass Index)             68 12 24.44 kg/m2          Blood Pressure from Last 3 Encounters:   18 112/68   18 110/74   17 133/89    Weight from Last 3 Encounters:   18 69.2 kg (152 lb 9.6 oz)   18 69.4 kg (153 lb)   17 76.7 kg (169 lb)              We Performed the Following     PHYSICAL THERAPY REFERRAL          Today's Medication Changes "          These changes are accurate as of 5/7/18  8:17 AM.  If you have any questions, ask your nurse or doctor.               Start taking these medicines.        Dose/Directions    butenafine 1 % Crea cream   Commonly known as:  MENTAX   Used for:  Fungal dermatosis   Started by:  Jarret Tay MD        Apply topically to lesion bid   Quantity:  60 g   Refills:  3         These medicines have changed or have updated prescriptions.        Dose/Directions    aspirin 81 MG tablet   This may have changed:  Another medication with the same name was removed. Continue taking this medication, and follow the directions you see here.   Changed by:  Jarret Tay MD        Dose:  162 mg   Take 162 mg by mouth daily   Refills:  0       lisinopril 5 MG tablet   Commonly known as:  PRINIVIL/ZESTRIL   This may have changed:  See the new instructions.   Used for:  Essential hypertension   Changed by:  Jarret Tay MD        Dose:  5 mg   Take 1 tablet (5 mg) by mouth daily   Quantity:  90 tablet   Refills:  3            Where to get your medicines      These medications were sent to [id number] - [place], [place] - [address], [place] [address]     Phone:  [phone]     butenafine 1 % Crea cream    lisinopril 5 MG tablet                Primary Care Provider Office Phone # Fax #    Jarret Tay [phone] [address] [place] [address]        Equal Access to Services     [facility]: Hadii venita newby hadasho Soarianaali, waaxda luqadaha, qaybta kaalmada adeegyada, sukhdeep carrera . So [facility] [phone].    ATENCIÓN: Si habla español, tiene a lee disposición servicios gratuitos de asistencia lingüística. Llame al [phone].    We comply with applicable federal civil rights laws and [place] laws. We do not discriminate on the basis of race, color, national origin, age, disability, sex, sexual orientation,  or gender identity.            Thank you!     Thank you for choosing [facility]  for your care. Our goal is always to provide you with excellent care. Hearing back from our patients is one way we can continue to improve our services. Please take a few minutes to complete the written survey that you may receive in the mail after your visit with us. Thank you!             Your Updated Medication List - Protect others around you: Learn how to safely use, store and throw away your medicines at www.disposemymeds.org.          This list is accurate as of 5/7/18  8:17 AM.  Always use your most recent med list.                   Brand Name Dispense Instructions for use Diagnosis    aspirin 81 MG tablet      Take 162 mg by mouth daily        butenafine 1 % Crea cream    MENTAX    60 g    Apply topically to lesion bid    Fungal dermatosis       fenofibrate 145 MG tablet     90 tablet    TAKE 1 TABLET BY MOUTH EVERY DAY    Hypercholesteremia       FEXOFENADINE HCL PO      Take 180 mg by mouth daily        hydrocortisone 2.5 % cream    PROCTOSOL HC    28.35 g    place rectally twice daily as directed.    Preventative health care       lisinopril 5 MG tablet    PRINIVIL/ZESTRIL    90 tablet    Take 1 tablet (5 mg) by mouth daily    Essential hypertension       OPCON-A OP      Place 1-2 drops into both eyes daily as needed (allergies)        PROBIOTIC ACIDOPHILUS PO      Take 100 mg by mouth 2 times daily as needed        STATIN NOT PRESCRIBED (INTENTIONAL)      1 each daily Please choose reason not prescribed, below    Hypercholesteremia, Cerebrovascular accident (CVA) due to embolism of left cerebellar artery (H)       * triamcinolone 0.1 % cream    KENALOG    80 g    Apply sparingly to affected area three times daily as needed    Encounter for medication refill       * triamcinolone 0.1 % cream    KENALOG    80 g    Apply  topically 2 times daily as needed. Apply sparingly to affected area.    Rash       vitamin D  2000 units tablet     30 tablet    TAKE 1 TABLET BY MOUTH EVERY DAY    Vitamin D deficiency       * Notice:  This list has 2 medication(s) that are the same as other medications prescribed for you. Read the directions carefully, and ask your doctor or other care provider to review them with you.

## 2018-05-07 NOTE — PROGRESS NOTES
Problem(s) Oriented visit        SUBJECTIVE:                                                    Aiden Valdez is a 62 year old male who presents to clinic today for the following health issues :        1. Essential hypertension  Blood presure remains well controlled when checked out of clinic.    Reviewed last 6 BP readings in chart:  BP Readings from Last 6 Encounters:   05/07/18 112/68   01/22/18 110/74   05/19/17 133/89   05/11/17 136/82   05/08/17 138/88   02/09/17 128/82       he has not experienced any significant side effects from medications for hypertension.    NO active cardiac complaints or symptoms with exercise.    - lisinopril (PRINIVIL/ZESTRIL) 5 MG tablet; Take 1 tablet (5 mg) by mouth daily  Dispense: 90 tablet; Refill: 3    2. Chronic midline thoracic back pain  Due to work positioning    3. Fungal dermatosis  Itchy under foreskin  - butenafine (MENTAX) 1 % CREA cream; Apply topically to lesion bid  Dispense: 60 g; Refill: 3    4. concern regaurding previous stroke  Lots of rumininatint about recurrance , wee ntoes from Neurology         Problem list, Medication list, Allergies, and Medical/Social/Surgical histories reviewed in [facility] and updated as appropriate.   Additional history: as documented    ROS:  General and Resp. completed and negative except as noted above    Histories:   Patient Active Problem List   Diagnosis     Hypercholesteremia     Advanced directives, counseling/discussion     Family history of prostate cancer     Essential hypertension     Headache, unspecified headache type     Stroke (cerebrum) (H)     Stroke (H)     Past Surgical History:   Procedure Laterality Date     EXTRACAPSULAR CATARACT EXTRATION WITH INTRAOCULAR LENS IMPLANT       PROSTATE BIOPSY, NEEDLE, SATURATION SAMPLING         Social History   Substance Use Topics     Smoking status: Never Smoker     Smokeless tobacco: Never Used     Alcohol use Yes      Comment: 1-3 week     Family History   Problem Relation Age of  Onset     Respiratory Father      ROSALINE.A.D. Mother      CEREBROVASCULAR DISEASE Mother      Hypertension Mother      Prostate Cancer Brother            OBJECTIVE:                                                    /68  Pulse 68  Resp 12  Wt 69.2 kg (152 lb 9.6 oz)  BMI 24.44 kg/m2  Body mass index is 24.44 kg/(m^2).   APPEARANCE: = Relaxed and in no distress  Conj/Eyelids = noninjected and lids and lashes are without inflammation  PERRLA/Irises = Pupils Round Reactive to Light and Irisis without inflammation  Neck = No anterior or posterior adenopathy appreciated.  Thyroid = Not enlarged and no masses felt  Resp effort = Calm regular breathing  Breath Sounds = Good air movement with no rales or rhonchi in any lung fields  Heart Rate, Rythym, & sounds (no Murm)  = Regular rate and rythym with no S3, S4, or murmer appreciated.  Carotid Art's = Pulses full and equal and no bruits appreciated  Abdomen = Soft, nontender, no masses, & bowel sounds in all quadrants  Liver/Spleen = Normal span and no splenomegaly noted  Digits and Nails = FROM in all finger joints, no nail dystrophy  Ext (edema) = No pretibial edema noted or elsewhere  Musculsktl =  Strength and ROM of major joints are within normal limits  SKIN = absent significant rashes or lesions   Recent/Remote Memory = Alert and Oriented x 3  Mood/Affect = Cooperative and interested     ASSESSMENT/PLAN:                                                        Aiden was seen today for hypertension and medication reconciliation.    Diagnoses and all orders for this visit:    Essential hypertension  -     lisinopril (PRINIVIL/ZESTRIL) 5 MG tablet; Take 1 tablet (5 mg) by mouth daily    Chronic midline thoracic back pain    Fungal dermatosis  -     butenafine (MENTAX) 1 % CREA cream; Apply topically to lesion bid    concern regaurding previous stroke      >25 min spent with patient, greater than 50% spent on discussion/education/planning, etc. About The primary  encounter diagnosis was Essential hypertension. Diagnoses of Chronic midline thoracic back pain, Fungal dermatosis, and concern regaurding previous stroke were also pertinent to this visit.        Work on weight loss  Regular exercise    The following health maintenance items are reviewed in Epic and correct as of today:  Health Maintenance   Topic Date Due     HIV SCREEN (SYSTEM ASSIGNED)  11/14/1973     ADVANCE DIRECTIVE PLANNING Q5 YRS  01/22/2020     TETANUS IMMUNIZATION (SYSTEM ASSIGNED)  12/31/2022     LIPID SCREEN Q5 YR MALE (SYSTEM ASSIGNED)  01/15/2023     COLONOSCOPY Q5 YR  04/09/2023     INFLUENZA VACCINE  Completed     HEPATITIS C SCREENING  Completed       Jarret Tay MD  [facility]  Family Practice  [facility]  [phone]    For any issues my office # is [phone]

## 2018-05-15 ENCOUNTER — TELEPHONE (OUTPATIENT)
Dept: FAMILY MEDICINE | Facility: CLINIC | Age: 63
End: 2018-05-15

## 2018-05-15 NOTE — TELEPHONE ENCOUNTER
Received fax from [facility] pharmacy stating patients rx for Mentax cream is to expensive and no generic available. Per Tracee-pharmacist- patient can get Lotrim Ultra OTC in place of prescription.  Called patient to let him know of change.

## 2018-06-08 NOTE — TELEPHONE ENCOUNTER
Patient called clinic stating that he has been using the Lotrim and he now is having increased irritation and blister on foreskin of penis.  After speaking with Dr. Tay and Tracee DUNN to discuss option patient is advised to call dermatology.  Phone numbers for  Dermatology and [facility] given to patient and he is advised to call for appointment-he agrees.  Lenora Sanchez

## 2018-08-06 ENCOUNTER — TRANSFERRED RECORDS (OUTPATIENT)
Dept: FAMILY MEDICINE | Facility: CLINIC | Age: 63
End: 2018-08-06

## 2018-10-11 ENCOUNTER — OFFICE VISIT (OUTPATIENT)
Dept: FAMILY MEDICINE | Facility: CLINIC | Age: 63
End: 2018-10-11

## 2018-10-11 VITALS
SYSTOLIC BLOOD PRESSURE: 130 MMHG | HEIGHT: 67 IN | DIASTOLIC BLOOD PRESSURE: 88 MMHG | WEIGHT: 147.8 LBS | BODY MASS INDEX: 23.2 KG/M2 | TEMPERATURE: 98.6 F | HEART RATE: 54 BPM

## 2018-10-11 DIAGNOSIS — Z01.818 PREOP GENERAL PHYSICAL EXAM: Primary | ICD-10-CM

## 2018-10-11 DIAGNOSIS — Z23 NEED FOR PROPHYLACTIC VACCINATION AND INOCULATION AGAINST INFLUENZA: ICD-10-CM

## 2018-10-11 DIAGNOSIS — Z00.00 PREVENTATIVE HEALTH CARE: ICD-10-CM

## 2018-10-11 DIAGNOSIS — I63.442 CEREBROVASCULAR ACCIDENT (CVA) DUE TO EMBOLISM OF LEFT CEREBELLAR ARTERY (H): ICD-10-CM

## 2018-10-11 PROCEDURE — 90686 IIV4 VACC NO PRSV 0.5 ML IM: CPT | Performed by: FAMILY MEDICINE

## 2018-10-11 PROCEDURE — 99215 OFFICE O/P EST HI 40 MIN: CPT | Mod: 25 | Performed by: FAMILY MEDICINE

## 2018-10-11 PROCEDURE — 90471 IMMUNIZATION ADMIN: CPT | Performed by: FAMILY MEDICINE

## 2018-10-11 RX ORDER — KETOCONAZOLE 20 MG/ML
SHAMPOO TOPICAL PRN
COMMUNITY
Start: 2018-08-14

## 2018-10-11 NOTE — MR AVS SNAPSHOT
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 This includes signs of a cold or flu (such as a sore throat, runny nose, cough, rash or fever).    Do not smoke, drink alcohol or take over the counter medicine (unless your surgeon or primary care doctor tells you to) for the 24 hours before and after surgery.    If you take prescribed drugs: Follow your doctor s orders about which medicines to take and which to stop until after surgery.    Eating and drinking prior to surgery: follow the instructions from your surgeon    Take a shower or bath the night before surgery. Use the soap your surgeon gave you to gently clean your skin. If you do not have soap from your surgeon, use your regular soap. Do not shave or scrub the surgery site.  Wear clean pajamas and have clean sheets on your bed.           Follow-ups after your visit        Additional Services     Referral to [facility]       Referral to [facility]  Phone:  [phone]  Reason for Referral:  Follow up on CVA in 2017    Please be aware that coverage of these services is subject to the terms and limitations of your health insurance plan.  Call member services at your health plan with any benefit or coverage questions.                  Who to contact     If you have questions or need follow up information about today's clinic  "visit or your schedule please contact [facility] directly at [phone].  Normal or non-critical lab and imaging results will be communicated to you by MyChart, letter or phone within 4 business days after the clinic has received the results. If you do not hear from us within 7 days, please contact the clinic through [employer] or phone. If you have a critical or abnormal lab result, we will notify you by phone as soon as possible.  Submit refill requests through [employer] or call your pharmacy and they will forward the refill request to us. Please allow 3 business days for your refill to be completed.          Additional Information About Your Visit        MyChart Information     [employer] lets you send messages to your doctor, view your test results, renew your prescriptions, schedule appointments and more. To sign up, go to [email or web address] . Click on \"Log in\" on the left side of the screen, which will take you to the Welcome page. Then click on \"Sign up Now\" on the right side of the page.     You will be asked to enter the access code listed below, as well as some personal information. Please follow the directions to create your username and password.     Your access code is: [email or web address]  Expires: 2019  2:45 PM     Your access code will  in 90 days. If you need help or a new code, please call your [facility] clinic or [phone].        Care EveryWhere ID     This is your Care EveryWhere ID. This could be used by other organizations to access your [facility] medical records  [id number]        Your Vitals Were     Pulse Temperature Height BMI (Body Mass Index)          54 98.6  F (37  C) (Oral) 1.695 m (5' 6.75\") 23.32 kg/m2         Blood Pressure from Last 3 Encounters:   10/11/18 130/88   18 112/68   18 110/74    Weight from Last 3 Encounters:   10/11/18 67 kg (147 lb 12.8 oz)   18 69.2 kg (152 lb 9.6 oz)   18 69.4 kg (153 lb)              We Performed the " Following     FLU VACCINE, SPLIT VIRUS, IM (QUADRIVALENT) [34807]- >3 YRS     Referral to [facility]     Vaccine Administration, Initial [57790]          Where to get your medicines      These medications were sent to [facility] [address] - [place], [place] - [address]  [address], [place] [address]     Phone:  [phone]     hydrocortisone 2.5 % cream          Primary Care Provider Office Phone # Fax #    Jarret Tay [phone] [address] [place] [address]        Equal Access to Services     [facility] : Hadii aad ku hadasho Soomaali, waaxda luqadaha, qaybta kaalmada adeegyada, sukhdeep carrera . So [facility] [phone].    ATENCIÓN: Si habla español, tiene a lee disposición servicios gratuitos de asistencia lingüística. [facility] [phone].    We comply with applicable federal civil rights laws and [place] laws. We do not discriminate on the basis of race, color, national origin, age, disability, sex, sexual orientation, or gender identity.            Thank you!     Thank you for choosing [facility]  for your care. Our goal is always to provide you with excellent care. Hearing back from our patients is one way we can continue to improve our services. Please take a few minutes to complete the written survey that you may receive in the mail after your visit with us. Thank you!             Your Updated Medication List - Protect others around you: Learn how to safely use, store and throw away your medicines at www.disposemymeds.org.          This list is accurate as of 10/11/18  2:45 PM.  Always use your most recent med list.                   Brand Name Dispense Instructions for use Diagnosis    aspirin 81 MG tablet      Take 162 mg by mouth daily        fenofibrate 145 MG tablet     90 tablet    TAKE 1 TABLET BY MOUTH EVERY DAY    Hypercholesteremia       FEXOFENADINE HCL PO      Take 180  mg by mouth daily        hydrocortisone 2.5 % cream    PROCTOSOL HC    28.35 g    place rectally twice daily as directed.    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 Apply sparingly to affected area.    Rash       vitamin D 2000 units tablet     30 tablet    TAKE 1 TABLET BY MOUTH EVERY DAY    Vitamin D deficiency       * Notice:  This list has 2 medication(s) that are the same as other medications prescribed for you. Read the directions carefully, and ask your doctor or other care provider to review them with you.

## 2018-10-25 NOTE — PROGRESS NOTES
10/11/18 faxed preop to [facility] [place] @ [phone]    Evangelista Corrales,   [facility]  [phone]

## 2019-01-17 DIAGNOSIS — E78.00 HYPERCHOLESTEREMIA: ICD-10-CM

## 2019-01-17 DIAGNOSIS — Z12.5 SCREENING FOR PROSTATE CANCER: ICD-10-CM

## 2019-01-17 DIAGNOSIS — I10 ESSENTIAL HYPERTENSION: Primary | ICD-10-CM

## 2019-01-17 LAB
% GRANULOCYTES: 71.4 % (ref 42.2–75.2)
HCT VFR BLD AUTO: 42 % (ref 39–51)
HEMOGLOBIN: 13.9 G/DL (ref 13.4–17.5)
LYMPHOCYTES NFR BLD AUTO: 22.4 % (ref 20.5–51.1)
MCH RBC QN AUTO: 29.2 PG (ref 27–31)
MCHC RBC AUTO-ENTMCNC: 33.1 G/DL (ref 33–37)
MCV RBC AUTO: 88.2 FL (ref 80–100)
MONOCYTES NFR BLD AUTO: 6.2 % (ref 1.7–9.3)
PLATELET # BLD AUTO: 330 K/UL (ref 140–450)
RBC # BLD AUTO: 4.76 X10/CMM (ref 4.2–5.9)
WBC # BLD AUTO: 4.7 X10/CMM (ref 3.8–11)

## 2019-01-17 PROCEDURE — 36415 COLL VENOUS BLD VENIPUNCTURE: CPT | Performed by: FAMILY MEDICINE

## 2019-01-17 PROCEDURE — 85025 COMPLETE CBC W/AUTO DIFF WBC: CPT | Performed by: FAMILY MEDICINE

## 2019-01-17 NOTE — LETTER
[facility] [address] [place]  [address]  Phone: [phone]    January 21, 2019      Aiden Valdez  [address]    [place] [address]              Dear Aiden,    The results from your recent visit showed I am writing to report that your included test results are within expected ranges. I do not suggest that we make any changes at this time.        Sincerely,     Jarret Tay M.D.    Results for orders placed or performed in visit on 01/17/19   Comp. Metabolic Panel (14) ([facility])   Result Value Ref Range    Glucose 97 65 - 99 mg/dL    Urea Nitrogen 18 8 - 27 mg/dL    Creatinine 0.96 0.76 - 1.27 mg/dL    eGFR If NonAfricn Am 83 >59 mL/min/1.73    eGFR If Africn Am 95 >59 mL/min/1.73    BUN/Creatinine Ratio 19 10 - 24    Sodium 142 134 - 144 mmol/L    Potassium 5.3 (H) 3.5 - 5.2 mmol/L    Chloride 104 96 - 106 mmol/L    Total CO2 25 20 - 29 mmol/L    Calcium 9.8 8.6 - 10.2 mg/dL    Protein Total 6.7 6.0 - 8.5 g/dL    Albumin 4.3 3.6 - 4.8 g/dL    Globulin, Total 2.4 1.5 - 4.5 g/dL    A/G Ratio 1.8 1.2 - 2.2    Bilirubin Total 0.3 0.0 - 1.2 mg/dL    Alkaline Phosphatase 35 (L) 39 - 117 IU/L    AST 19 0 - 40 IU/L    ALT 13 0 - 44 IU/L    Narrative    Performed at:  01 - [facility] [address], [place], [place]  [id number]  : Akhil Baxter MD, Phone:  [phone]   Lipid Panel ([facility])   Result Value Ref Range    Cholesterol 169 100 - 199 mg/dL    Triglycerides 55 0 - 149 mg/dL    HDL Cholesterol 51 >39 mg/dL    VLDL Cholesterol George 11 5 - 40 mg/dL    LDL Cholesterol Calculated 107 (H) 0 - 99 mg/dL    LDL/HDL Ratio 2.1 0.0 - 3.6 ratio    Narrative    Performed at:  01 - [facility] [address], [place], [place]  [id number]  : Akhil Baxter MD, Phone:  [phone]   PSA Serum ([facility])   Result Value Ref Range    PSA NG/ML 3.3 0.0 - 4.0 ng/mL    Narrative    Performed at:  01 - [facility] [address], [place], [place]   [id number]  : Akhil Baxter MD, Phone:  [phone]   CBC with Diff/Plt ([facility])   Result Value Ref Range    WBC x10/cmm 4.7 3.8 - 11.0 x10/cmm    % Lymphocytes 22.4 20.5 - 51.1 %    % Monocytes 6.2 1.7 - 9.3 %    % Granulocytes 71.4 42.2 - 75.2 %    RBC x10/cmm 4.76 4.2 - 5.9 x10/cmm    Hemoglobin 13.9 13.4 - 17.5 g/dl    Hematocrit 42.0 39 - 51 %    MCV 88.2 80 - 100 fL    MCH 29.2 27.0 - 31.0 pg    MCHC 33.1 33.0 - 37.0 g/dL    Platelet Count 330 140 - 450 K/uL   TSH ([facility])   Result Value Ref Range    TSH 0.935 0.450 - 4.500 uIU/mL    Narrative    Performed at:  01 - [facility] [address], [place], [place]  [id number]  : Akhil Baxter MD, Phone:  [phone]

## 2019-01-18 LAB
ALBUMIN SERPL-MCNC: 4.3 G/DL (ref 3.6–4.8)
ALBUMIN/GLOB SERPL: 1.8 {RATIO} (ref 1.2–2.2)
ALP SERPL-CCNC: 35 IU/L (ref 39–117)
ALT SERPL-CCNC: 13 IU/L (ref 0–44)
AST SERPL-CCNC: 19 IU/L (ref 0–40)
BILIRUB SERPL-MCNC: 0.3 MG/DL (ref 0–1.2)
BUN SERPL-MCNC: 18 MG/DL (ref 8–27)
BUN/CREATININE RATIO: 19 (ref 10–24)
CALCIUM SERPL-MCNC: 9.8 MG/DL (ref 8.6–10.2)
CHLORIDE SERPLBLD-SCNC: 104 MMOL/L (ref 96–106)
CHOLEST SERPL-MCNC: 169 MG/DL (ref 100–199)
CREAT SERPL-MCNC: 0.96 MG/DL (ref 0.76–1.27)
EGFR IF AFRICN AM: 95 ML/MIN/1.73
EGFR IF NONAFRICN AM: 83 ML/MIN/1.73
GLOBULIN, TOTAL: 2.4 G/DL (ref 1.5–4.5)
GLUCOSE SERPL-MCNC: 97 MG/DL (ref 65–99)
HDLC SERPL-MCNC: 51 MG/DL
LDL/HDL RATIO: 2.1 RATIO (ref 0–3.6)
LDLC SERPL CALC-MCNC: 107 MG/DL (ref 0–99)
POTASSIUM SERPL-SCNC: 5.3 MMOL/L (ref 3.5–5.2)
PROT SERPL-MCNC: 6.7 G/DL (ref 6–8.5)
PSA NG/ML: 3.3 NG/ML (ref 0–4)
SODIUM SERPL-SCNC: 142 MMOL/L (ref 134–144)
TOTAL CO2: 25 MMOL/L (ref 20–29)
TRIGL SERPL-MCNC: 55 MG/DL (ref 0–149)
TSH BLD-ACNC: 0.94 UIU/ML (ref 0.45–4.5)
VLDLC SERPL CALC-MCNC: 11 MG/DL (ref 5–40)

## 2019-01-24 ENCOUNTER — OFFICE VISIT (OUTPATIENT)
Dept: FAMILY MEDICINE | Facility: CLINIC | Age: 64
End: 2019-01-24

## 2019-01-24 VITALS
OXYGEN SATURATION: 97 % | DIASTOLIC BLOOD PRESSURE: 80 MMHG | RESPIRATION RATE: 16 BRPM | WEIGHT: 146.4 LBS | SYSTOLIC BLOOD PRESSURE: 132 MMHG | HEART RATE: 62 BPM | BODY MASS INDEX: 22.98 KG/M2 | HEIGHT: 67 IN

## 2019-01-24 DIAGNOSIS — E78.00 HYPERCHOLESTEREMIA: ICD-10-CM

## 2019-01-24 DIAGNOSIS — I10 ESSENTIAL HYPERTENSION: ICD-10-CM

## 2019-01-24 DIAGNOSIS — I63.40 CEREBROVASCULAR ACCIDENT (CVA) DUE TO EMBOLISM OF CEREBRAL ARTERY (H): ICD-10-CM

## 2019-01-24 DIAGNOSIS — Z13.6 SCREENING FOR AAA (ABDOMINAL AORTIC ANEURYSM): ICD-10-CM

## 2019-01-24 DIAGNOSIS — Z00.00 MEDICARE ANNUAL WELLNESS VISIT, SUBSEQUENT: Primary | ICD-10-CM

## 2019-01-24 DIAGNOSIS — E87.5 HYPERPOTASSEMIA: ICD-10-CM

## 2019-01-24 DIAGNOSIS — Z23 NEED FOR VACCINATION: ICD-10-CM

## 2019-01-24 PROCEDURE — 90670 PCV13 VACCINE IM: CPT | Performed by: FAMILY MEDICINE

## 2019-01-24 PROCEDURE — 99397 PER PM REEVAL EST PAT 65+ YR: CPT | Performed by: FAMILY MEDICINE

## 2019-01-24 PROCEDURE — 90471 IMMUNIZATION ADMIN: CPT | Performed by: FAMILY MEDICINE

## 2019-01-24 PROCEDURE — 99213 OFFICE O/P EST LOW 20 MIN: CPT | Mod: 25 | Performed by: FAMILY MEDICINE

## 2019-01-24 RX ORDER — FENOFIBRATE 145 MG/1
145 TABLET, COATED ORAL DAILY
Qty: 90 TABLET | Refills: 3 | Status: SHIPPED | OUTPATIENT
Start: 2019-01-24 | End: 2020-04-27 | Stop reason: DRUGHIGH

## 2019-01-24 RX ORDER — LISINOPRIL 5 MG/1
5 TABLET ORAL DAILY
Qty: 90 TABLET | Refills: 3 | Status: SHIPPED | OUTPATIENT
Start: 2019-01-24 | End: 2020-01-27

## 2019-01-24 ASSESSMENT — MIFFLIN-ST. JEOR: SCORE: 1399.76

## 2019-01-24 NOTE — PROGRESS NOTES
"  SUBJECTIVE:   Aiden Valdez is a 63 year old male who presents for Preventive Visit.    Are you in the first 12 months of your Medicare Part B coverage?  No    Physical Health:    In general, how would you rate your overall physical health? good    Outside of work, how many days during the week do you exercise? 4-5 days/week    Outside of work, approximately how many minutes a day do you exercise?45-60 minutes    If you drink alcohol do you typically have >3 drinks per day or >7 drinks per week? No    Do you usually eat at least 4 servings of fruit and vegetables a day, include whole grains & fiber and avoid regularly eating high fat or \"junk\" foods? Yes    Do you have any problems taking medications regularly?  No    Do you have any side effects from medications? none    Needs assistance for the following daily activities: no assistance needed    Which of the following safety concerns are present in your home?  none identified     Hearing impairment: No-hearing test pass on both ears    In the past 6 months, have you been bothered by leaking of urine? no    Mental Health:    In general, how would you rate your overall mental or emotional health? good  PHQ-2 Score:      Do you feel safe in your environment? Yes    Do you have a Health Care Directive? no    Additional concerns to address?      Fall risk:       Cognitive Screenin) Repeat 3 items (Leader, Season, Table)    2) Clock draw: NORMAL  3) 3 item recall: Recalls 3 objects  Results: 3 items recalled: COGNITIVE IMPAIRMENT LESS LIKELY    Mini-CogTM Copyright ADY Partida. Licensed by the author for use in [facility]; reprinted with permission ([email or web address]@.[email or web address]). All rights reserved.      Do you have sleep apnea, excessive snoring or daytime drowsiness?: no        PROBLEMS TO ADD ON...  -------------------------------------    Reviewed and updated as needed this visit by clinical staff  Tobacco  Allergies  Meds         Reviewed and updated as needed " this visit by Provider        Social History     Tobacco Use     Smoking status: Never Smoker     Smokeless tobacco: Never Used   Substance Use Topics     Alcohol use: Yes     Comment: 1-3 week                           Current providers sharing in care for this patient include:   Patient Care Team:  Jarret Tay MD as PCP - General (Family Practice)    The following health maintenance items are reviewed in Epic and correct as of today:  Health Maintenance   Topic Date Due     HIV SCREEN (SYSTEM ASSIGNED)  11[birth year]14/1973     ZOSTER IMMUNIZATION (2 of 3) 03/23/2016     FALL RISK ASSESSMENT  11/14/2018     AORTIC ANEURYSM SCREENING (SYSTEM ASSIGNED)  11/14/2018     PHQ-2 Q1 YR  01/22/2019     ADVANCE DIRECTIVE PLANNING Q5 YRS  01/22/2020     DTAP/TDAP/TD IMMUNIZATION (3 - Td) 12/31/2022     COLONOSCOPY Q5 YR  04/09/2023     LIPID SCREEN Q5 YR MALE (SYSTEM ASSIGNED)  01/17/2024     INFLUENZA VACCINE  Completed     HEPATITIS C SCREENING  Completed     IPV IMMUNIZATION  Aged Out     MENINGITIS IMMUNIZATION  Aged Out     Patient Active Problem List   Diagnosis     Hypercholesteremia     Advanced directives, counseling/discussion     Family history of prostate cancer     Essential hypertension     Headache, unspecified headache type     Stroke (cerebrum) (H)     Stroke (H)     Past Surgical History:   Procedure Laterality Date     EXTRACAPSULAR CATARACT EXTRATION WITH INTRAOCULAR LENS IMPLANT       PROSTATE BIOPSY, NEEDLE, SATURATION SAMPLING         Social History     Tobacco Use     Smoking status: Never Smoker     Smokeless tobacco: Never Used   Substance Use Topics     Alcohol use: Yes     Comment: 1-3 week     Family History   Problem Relation Age of Onset     Respiratory Father      C.A.D. Mother      Cerebrovascular Disease Mother      Hypertension Mother      Prostate Cancer Brother          Pneumonia Vaccine:Adults age 65+ who have not received previous Pneumovax (PPSV23) or PCV13 as an adult: Should first be  "given PCV13 AND then should be given PPSV23 6-12 months after PCV13    ROS:  Constitutional, HEENT, cardiovascular, pulmonary, gi and gu systems are negative, except as otherwise noted.    OBJECTIVE:   /80   Pulse 62   Resp 16   Ht 1.689 m (5' 6.5\")   Wt 66.4 kg (146 lb 6.4 oz)   SpO2 97%   BMI 23.28 kg/m   Estimated body mass index is 23.28 kg/m  as calculated from the following:    Height as of this encounter: 1.689 m (5' 6.5\").    Weight as of this encounter: 66.4 kg (146 lb 6.4 oz).  EXAM:   GENERAL: healthy, alert and no distress  EYES: Eyes grossly normal to inspection, PERRL and conjunctivae and sclerae normal  HENT: ear canals and TM's normal, nose and mouth without ulcers or lesions  NECK: no adenopathy, no asymmetry, masses, or scars and thyroid normal to palpation  RESP: lungs clear to auscultation - no rales, rhonchi or wheezes  CV: regular rate and rhythm, normal S1 S2, no S3 or S4, no murmur, click or rub, no peripheral edema and peripheral pulses strong  ABDOMEN: soft, nontender, no hepatosplenomegaly, no masses and bowel sounds normal   (male): normal male genitalia without lesions or urethral discharge, no hernia  RECTAL: normal sphincter tone, no rectal masses, prostate normal size, smooth, nontender without nodules or masses  MS: no gross musculoskeletal defects noted, no edema  SKIN: no suspicious lesions or rashes  NEURO: Normal strength and tone, mentation intact and speech normal  PSYCH: mentation appears normal, affect normal/bright        ASSESSMENT / PLAN:   Aiden was seen today for physical.    Diagnoses and all orders for this visit:    Health maint    Screening for AAA (abdominal aortic aneurysm)  -     Abdominal Aortic Aneurysm Screening/Tracking    Hypercholesteremia  -     fenofibrate (TRICOR) 145 MG tablet; Take 1 tablet (145 mg) by mouth daily  Discussed current lipid results, previous results (if available) current guidelines (NCEP) for treatment and goals for lipids.  " "Discussed lifestyle modification, dietary changes (low fat, low simple carb) and regular aerobic exercise.  Discussed the link between dysmetabolic syndrome and impaired glucose tolerance seen in certain patterns of lipids.  Briefly discussed medication used for lipid lowering, including the statins are their possible side effects of myalgias, rhabdomyolysis, and liver toxicity.    Essential hypertension  -     aspirin (ASA) 81 MG tablet; Take 2 tablets (162 mg) by mouth daily  -     lisinopril (PRINIVIL/ZESTRIL) 5 MG tablet; Take 1 tablet (5 mg) by mouth daily  Blood presure remains well controlled when checked out of clinic.    Reviewed last 6 BP readings in chart:  BP Readings from Last 6 Encounters:   01/24/19 132/80   10/11/18 130/88   05/07/18 112/68   01/22/18 110/74   05/19/17 133/89   05/11/17 136/82       he has not experienced any significant side effects from medications for hypertension.    NO active cardiac complaints or symptoms with exercise.    Need for vaccination  -     PNEUMOCOCCAL CONJ VACCINE 13 VALENT IM    Cerebrovascular accident (CVA) due to embolism of cerebral artery (H)    Elevated Potassium, decrease the Bananas and avocodo    End of Life Planning:  Patient currently has an advanced directive: Yes.  Practitioner is supportive of decision.    COUNSELING:  Reviewed preventive health counseling, as reflected in patient instructions    BP Readings from Last 1 Encounters:   01/24/19 132/80     Estimated body mass index is 23.28 kg/m  as calculated from the following:    Height as of this encounter: 1.689 m (5' 6.5\").    Weight as of this encounter: 66.4 kg (146 lb 6.4 oz).           reports that  has never smoked. he has never used smokeless tobacco.      Appropriate preventive services were discussed with this patient, including applicable screening as appropriate for cardiovascular disease, diabetes, osteopenia/osteoporosis, and glaucoma.  As appropriate for age/gender, discussed screening " for colorectal cancer, prostate cancer, breast cancer, and cervical cancer. Checklist reviewing preventive services available has been given to the patient.    Reviewed patients plan of care and provided an AVS. The Basic Care Plan (routine screening as documented in Health Maintenance) for Aiden meets the Care Plan requirement. This Care Plan has been established and reviewed with the Patient.    Counseling Resources:  ATP IV Guidelines  Pooled Cohorts Equation Calculator  Breast Cancer Risk Calculator  FRAX Risk Assessment  ICSI Preventive Guidelines  Dietary Guidelines for Americans, 2010  USDA's MyPlate  ASA Prophylaxis  Lung CA Screening    Jarret Tay MD  [facility]

## 2019-01-24 NOTE — PATIENT INSTRUCTIONS
Preventive Health Recommendations:     See your health care provider every year to    Review health changes.     Discuss preventive care.      Review your medicines if your doctor has prescribed any.    Talk with your health care provider about whether you should have a test to screen for prostate cancer (PSA).    Every 3 years, have a diabetes test (fasting glucose). If you are at risk for diabetes, you should have this test more often.    Every 5 years, have a cholesterol test. Have this test more often if you are at risk for high cholesterol or heart disease.     Every 10 years, have a colonoscopy. Or, have a yearly FIT test (stool test). These exams will check for colon cancer.    Talk to with your health care provider about screening for Abdominal Aortic Aneurysm if you have a family history of AAA or have a history of smoking.  Shots:     Get a flu shot each year.     Get a tetanus shot every 10 years.     Talk to your doctor about your pneumonia vaccines. There are now two you should receive - Pneumovax (PPSV 23) and Prevnar (PCV 13).    Talk to your pharmacist about a shingles vaccine.     Talk to your doctor about the hepatitis B vaccine.  Nutrition:     Eat at least 5 servings of fruits and vegetables each day.     Eat whole-grain bread, whole-wheat pasta and brown rice instead of white grains and rice.     Get adequate Calcium and Vitamin D.   Lifestyle    Exercise for at least 150 minutes a week (30 minutes a day, 5 days a week). This will help you control your weight and prevent disease.     Limit alcohol to one drink per day.     No smoking.     Wear sunscreen to prevent skin cancer.     See your dentist every six months for an exam and cleaning.     See your eye doctor every 1 to 2 years to screen for conditions such as glaucoma, macular degeneration and cataracts.    Personalized Prevention Plan  You are due for the preventive services outlined below.  Your care team is available to assist you in  scheduling these services.  If you have already completed any of these items, please share that information with your care team to update in your medical record.    Health Maintenance Due   Topic Date Due     HIV SCREEN (SYSTEM ASSIGNED)  11/14/1971     Zoster (Chicken Pox) Vaccine (2 of 3) 03/23/2016     FALL RISK ASSESSMENT  11/14/2018     AORTIC ANEURYSM SCREENING (SYSTEM ASSIGNED)  11/14/2018     Depression Assessment 2 - yearly  01/22/2019

## 2019-01-24 NOTE — LETTER
[facility] [address] [place]  [address]  Phone: [phone]    January 25, 2019      Aiden Valdez  [address]    [place] [address]              Dear Aiden,    I am writing to report that your included test results are within expected ranges. I do not suggest that we make any changes at this time.         Sincerely,     Jarret Tay M.D.    Results for orders placed or performed in visit on 01/24/19   Potassium  Serum ([facility])   Result Value Ref Range    Potassium 4.9 3.5 - 5.2 mmol/L    Narrative    Performed at:  01 - [facility] [address], [place], [place]  [id number]  : Akhil Baxter MD, Phone:  [phone]

## 2019-01-25 LAB — POTASSIUM SERPL-SCNC: 4.9 MMOL/L (ref 3.5–5.2)

## 2019-02-01 ENCOUNTER — TELEPHONE (OUTPATIENT)
Dept: FAMILY MEDICINE | Facility: CLINIC | Age: 64
End: 2019-02-01

## 2019-02-04 NOTE — TELEPHONE ENCOUNTER
2/1/19 patient called regarding potassium results from 1/24/19 -- 4.9.  This was rechecked due to K+ was elevated at 5.3 on 1/17/19.   Informed patient level was normal and no further action needed. Patient had been eating bananas, avocados and salmon daily or several times per week. He has many questions which were answered and asks if could repeat his K+ again in one month when goes in to see Dr. Goyal at [facility] for labs. Discussed probably not needed but patient interested. Ok per Dr. Tay for order to check potassium level to be faxed per patient's request. Order faxed and patient informed.  Kyleigh Taylor RN

## 2019-03-27 NOTE — PROGRESS NOTES
3/8/19 pharmacy sending request for refill for 81mg aspirin.  Epic shows we sent this to the pharmacy on 1/24/19 for #90 with 3 refills.  Patient take 2 tablets daily, this should be #180.  Pharmacy informed and Epic updated.    Evangleista

## 2019-05-22 ENCOUNTER — TRANSFERRED RECORDS (OUTPATIENT)
Dept: FAMILY MEDICINE | Facility: CLINIC | Age: 64
End: 2019-05-22

## 2019-06-25 DIAGNOSIS — R21 RASH: ICD-10-CM

## 2019-06-25 RX ORDER — TRIAMCINOLONE ACETONIDE 1 MG/G
CREAM TOPICAL
Qty: 80 G | Refills: 1 | Status: SHIPPED | OUTPATIENT
Start: 2019-06-25 | End: 2020-01-27

## 2019-06-25 NOTE — TELEPHONE ENCOUNTER
Refill medication Kenalog Cream 0.1%  LOV 01/24/2019  Labs 01/24/2019  Nel Rico MA on 6/25/2019 at 1:13 PM

## 2019-10-18 ENCOUNTER — TRANSFERRED RECORDS (OUTPATIENT)
Dept: FAMILY MEDICINE | Facility: CLINIC | Age: 64
End: 2019-10-18

## 2020-01-20 DIAGNOSIS — E78.00 HYPERCHOLESTEREMIA: Primary | ICD-10-CM

## 2020-01-20 DIAGNOSIS — Z80.42 FAMILY HISTORY OF PROSTATE CANCER: ICD-10-CM

## 2020-01-20 DIAGNOSIS — I10 ESSENTIAL HYPERTENSION: ICD-10-CM

## 2020-01-20 LAB
% GRANULOCYTES: 70.7 % (ref 42.2–75.2)
HCT VFR BLD AUTO: 42.7 % (ref 39–51)
HEMOGLOBIN: 14 G/DL (ref 13.4–17.5)
LYMPHOCYTES NFR BLD AUTO: 22.5 % (ref 20.5–51.1)
MCH RBC QN AUTO: 29.5 PG (ref 27–31)
MCHC RBC AUTO-ENTMCNC: 32.7 G/DL (ref 33–37)
MCV RBC AUTO: 90 FL (ref 80–100)
MONOCYTES NFR BLD AUTO: 6.8 % (ref 1.7–9.3)
PLATELET # BLD AUTO: 314 K/UL (ref 140–450)
RBC # BLD AUTO: 4.74 X10/CMM (ref 4.2–5.9)
WBC # BLD AUTO: 5.1 X10/CMM (ref 3.8–11)

## 2020-01-20 PROCEDURE — 36415 COLL VENOUS BLD VENIPUNCTURE: CPT | Performed by: FAMILY MEDICINE

## 2020-01-20 PROCEDURE — 85025 COMPLETE CBC W/AUTO DIFF WBC: CPT | Performed by: FAMILY MEDICINE

## 2020-01-20 RX ORDER — MULTIVIT-MIN/IRON/FOLIC ACID/K 18-600-40
CAPSULE ORAL DAILY
COMMUNITY

## 2020-01-20 RX ORDER — ZINC GLUCONATE 50 MG
50 TABLET ORAL DAILY
COMMUNITY

## 2020-01-20 NOTE — PROGRESS NOTES
pre cpx 1/27 Jasvir- comp, lipid, psa, cbc  Jocelin Ag MA January 20, 2020 8:43 AM      Abn signed for labs

## 2020-01-21 LAB
ALBUMIN SERPL-MCNC: 4.3 G/DL (ref 3.8–4.8)
ALBUMIN/GLOB SERPL: 1.4 {RATIO} (ref 1.2–2.2)
ALP SERPL-CCNC: 53 IU/L (ref 39–117)
ALT SERPL-CCNC: 17 IU/L (ref 0–44)
AST SERPL-CCNC: 22 IU/L (ref 0–40)
BILIRUB SERPL-MCNC: 0.3 MG/DL (ref 0–1.2)
BUN SERPL-MCNC: 19 MG/DL (ref 8–27)
BUN/CREATININE RATIO: 22 (ref 10–24)
CALCIUM SERPL-MCNC: 9.8 MG/DL (ref 8.6–10.2)
CHLORIDE SERPLBLD-SCNC: 103 MMOL/L (ref 96–106)
CHOLEST SERPL-MCNC: 190 MG/DL (ref 100–199)
CREAT SERPL-MCNC: 0.85 MG/DL (ref 0.76–1.27)
EGFR IF AFRICN AM: 105 ML/MIN/1.73
EGFR IF NONAFRICN AM: 91 ML/MIN/1.73
GLOBULIN, TOTAL: 3.1 G/DL (ref 1.5–4.5)
GLUCOSE SERPL-MCNC: 104 MG/DL (ref 65–99)
HDLC SERPL-MCNC: 54 MG/DL
LDL/HDL RATIO: 2.3 RATIO (ref 0–3.6)
LDLC SERPL CALC-MCNC: 122 MG/DL (ref 0–99)
POTASSIUM SERPL-SCNC: 5.1 MMOL/L (ref 3.5–5.2)
PROT SERPL-MCNC: 7.4 G/DL (ref 6–8.5)
PSA NG/ML: 5.6 NG/ML (ref 0–4)
SODIUM SERPL-SCNC: 140 MMOL/L (ref 134–144)
TOTAL CO2: 26 MMOL/L (ref 20–29)
TRIGL SERPL-MCNC: 68 MG/DL (ref 0–149)
VLDLC SERPL CALC-MCNC: 14 MG/DL (ref 5–40)

## 2020-01-27 ENCOUNTER — OFFICE VISIT (OUTPATIENT)
Dept: FAMILY MEDICINE | Facility: CLINIC | Age: 65
End: 2020-01-27

## 2020-01-27 VITALS
HEIGHT: 67 IN | BODY MASS INDEX: 24.89 KG/M2 | SYSTOLIC BLOOD PRESSURE: 136 MMHG | DIASTOLIC BLOOD PRESSURE: 80 MMHG | WEIGHT: 158.6 LBS | HEART RATE: 68 BPM | RESPIRATION RATE: 16 BRPM

## 2020-01-27 DIAGNOSIS — Z00.00 ENCOUNTER FOR MEDICARE ANNUAL WELLNESS EXAM: Primary | ICD-10-CM

## 2020-01-27 DIAGNOSIS — R97.20 HIGH PROSTATE SPECIFIC ANTIGEN (PSA): ICD-10-CM

## 2020-01-27 DIAGNOSIS — I63.40 CEREBROVASCULAR ACCIDENT (CVA) DUE TO EMBOLISM OF CEREBRAL ARTERY (H): ICD-10-CM

## 2020-01-27 DIAGNOSIS — R21 RASH: ICD-10-CM

## 2020-01-27 DIAGNOSIS — I10 ESSENTIAL HYPERTENSION: ICD-10-CM

## 2020-01-27 PROBLEM — I63.9 STROKE (H): Status: ACTIVE | Noted: 2017-05-18

## 2020-01-27 PROCEDURE — G0009 ADMIN PNEUMOCOCCAL VACCINE: HCPCS | Performed by: FAMILY MEDICINE

## 2020-01-27 PROCEDURE — 90732 PPSV23 VACC 2 YRS+ SUBQ/IM: CPT | Performed by: FAMILY MEDICINE

## 2020-01-27 PROCEDURE — G0439 PPPS, SUBSEQ VISIT: HCPCS | Performed by: FAMILY MEDICINE

## 2020-01-27 RX ORDER — TRIAMCINOLONE ACETONIDE 1 MG/G
CREAM TOPICAL
Qty: 80 G | Refills: 1 | Status: SHIPPED | OUTPATIENT
Start: 2020-01-27 | End: 2021-06-18

## 2020-01-27 RX ORDER — LISINOPRIL 5 MG/1
5 TABLET ORAL DAILY
Qty: 90 TABLET | Refills: 3 | Status: SHIPPED | OUTPATIENT
Start: 2020-01-27 | End: 2020-12-16

## 2020-01-27 RX ORDER — PRAVASTATIN SODIUM 10 MG
10 TABLET ORAL DAILY
Qty: 30 TABLET | Refills: 11 | Status: SHIPPED | OUTPATIENT
Start: 2020-01-27 | End: 2020-12-18

## 2020-01-27 ASSESSMENT — MIFFLIN-ST. JEOR: SCORE: 1454.06

## 2020-01-27 NOTE — PROGRESS NOTES
SUBJECTIVE:   Aiden Valdez is a 64 year old male who presents for Preventive Visit.      Are you in the first 12 months of your Medicare coverage?  No    HPI  Do you feel safe in your environment? Yes    Have you ever done Advance Care Planning? (For example, a Health Directive, POLST, or a discussion with a medical provider or your loved ones about your wishes): No, advance care planning information given to patient to review.  Patient plans to discuss their wishes with loved ones or provider.        2  HEARING FREQUENCY TESTED BOTH EARS:Failed  Right Ear/Left Ear      500 Hz: passed/failed: pass    1000 Hz: Passed   2000 Hz: Passed   4000 Hz: Passed  Jocelin Ag MA 1/27/2020      Fall risk       Cognitive Screening   1) Repeat 3 items (Leader, Season, Table)    2) Clock draw: ABNORMAL incorrect  3) 3 item recall: Recalls 2 objects   Results: ABNORMAL clock, 1-2 items recalled: PROBABLE COGNITIVE IMPAIRMENT, **INFORM PROVIDER**    Mini-CogTM Copyright ADY Partida. Licensed by the author for use in [facility]; reprinted with permission ([email or web address]). All rights reserved.      Do you have sleep apnea, excessive snoring or daytime drowsiness?: yes- sleep apnea- has CPAP machine x 2 wks- yet to begin    Reviewed and updated as needed this visit by clinical staff  Tobacco  Allergies  Meds         Reviewed and updated as needed this visit by Provider        Social History     Tobacco Use     Smoking status: Never Smoker     Smokeless tobacco: Never Used   Substance Use Topics     Alcohol use: Yes     Comment: 1-3 week     If you drink alcohol do you typically have >3 drinks per day or >7 drinks per week? No    Alcohol Use 1/27/2020   Prescreen: >3 drinks/day or >7 drinks/week? No       See neurology - Dr Goyal- some labs  Had stroke   has been unable in the past to tolerate statins.    Blood presure remains well controlled when checked out of clinic.    Reviewed last 6 BP readings in chart:  BP Readings  from Last 6 Encounters:   01/27/20 136/80   01/24/19 132/80   10/11/18 130/88   05/07/18 112/68   01/22/18 110/74   05/19/17 133/89       he has not experienced any significant side effects from medications for hypertension.    NO active cardiac complaints or symptoms with exercise.    Has history of hyperlipidemia.  Not in the past on statin for this, but reports good dietary compliance with a low chol diet.  Interested in knowing where he is at today.  Latest labs reviewed:  Recent Labs   Lab Test 01/20/20  0815 01/17/19  0815   CHOL 190 169   HDL 54 51   * 107*   TRIG 68 55        PSA is elevated again.  Lab results to discuss    Current providers sharing in care for this patient include:   Patient Care Team:  Jarret Tay MD as PCP - General (Family Practice)  Jarret Tay MD as Assigned PCP    The following health maintenance items are reviewed in Epic and correct as of today:  Health Maintenance   Topic Date Due     ZOSTER IMMUNIZATION (2 of 3) 03/23/2016     ADVANCE CARE PLANNING  01/22/2020     MEDICARE ANNUAL WELLNESS VISIT  01/24/2020     PNEUMOCOCCAL IMMUNIZATION 65+ LOW/MEDIUM RISK (2 of 2 - PPSV23) 01/24/2020     FALL RISK ASSESSMENT  01/20/2021     DTAP/TDAP/TD IMMUNIZATION (3 - Td) 12/31/2022     COLONOSCOPY  04/09/2023     LIPID  01/20/2025     HEPATITIS C SCREENING  Completed     PHQ-2  Completed     INFLUENZA VACCINE  Completed     AORTIC ANEURYSM SCREENING (SYSTEM ASSIGNED)  Completed     IPV IMMUNIZATION  Aged Out     MENINGITIS IMMUNIZATION  Aged Out     Patient Active Problem List   Diagnosis     Hypercholesteremia     Advanced directives, counseling/discussion     Family history of prostate cancer     Essential hypertension     Headache, unspecified headache type     Stroke (cerebrum) (H)     Stroke (H)     High prostate specific antigen (PSA)     Past Surgical History:   Procedure Laterality Date     EXTRACAPSULAR CATARACT EXTRATION WITH INTRAOCULAR LENS IMPLANT        "PROSTATE BIOPSY, NEEDLE, SATURATION SAMPLING         Social History     Tobacco Use     Smoking status: Never Smoker     Smokeless tobacco: Never Used   Substance Use Topics     Alcohol use: Yes     Comment: 1-3 week     Family History   Problem Relation Age of Onset     Respiratory Father      C.A.D. Mother      Cerebrovascular Disease Mother      Hypertension Mother      Prostate Cancer Brother          Pneumonia Vaccine:Adults age 65+ who received Pneumovax (PPSV23) at 65 years or older: Should be given PCV13 > 1 year after their most recent PPSV23    Review of Systems  Constitutional, HEENT, cardiovascular, pulmonary, GI, , musculoskeletal, neuro, skin, endocrine and psych systems are negative, except as otherwise noted.    OBJECTIVE:   /80   Pulse 68   Resp 16   Ht 1.695 m (5' 6.75\")   Wt 71.9 kg (158 lb 9.6 oz)   BMI 25.03 kg/m   Estimated body mass index is 25.03 kg/m  as calculated from the following:    Height as of this encounter: 1.695 m (5' 6.75\").    Weight as of this encounter: 71.9 kg (158 lb 9.6 oz).  Physical Exam  GENERAL: healthy, alert and no distress  EYES: Eyes grossly normal to inspection, PERRL and conjunctivae and sclerae normal  HENT: ear canals and TM's normal, nose and mouth without ulcers or lesions  NECK: no adenopathy, no asymmetry, masses, or scars and thyroid normal to palpation  RESP: lungs clear to auscultation - no rales, rhonchi or wheezes  CV: regular rate and rhythm, normal S1 S2, no S3 or S4, no murmur, click or rub, no peripheral edema and peripheral pulses strong  ABDOMEN: soft, nontender, no hepatosplenomegaly, no masses and bowel sounds normal  MS: no gross musculoskeletal defects noted, no edema  SKIN: no suspicious lesions or rashes  NEURO: Normal strength and tone, mentation intact and speech normal  PSYCH: mentation appears normal, affect normal/bright    Diagnostic Test Results:  Labs reviewed in Epic    ASSESSMENT / PLAN:   Aiden was seen today for " "physical.    Diagnoses and all orders for this visit:    Encounter for Medicare annual wellness exam    Essential hypertension  Discussed current hypertension treatment guidelines, including indications for treatment and treatment options.  Discussed the importance for aggressive management of HTN to prevent vascular complications later.  Recommended lower fat, lower carbohydrate, and lower sodium (<2000 mg)diet.  Discussed required intervals for follow up on HTN, lab studies.  Recommened pt. follow their blood pressures outside the clinic to ensure that BPs are remaining within guidelines, and to contact me if the readings are not within guidelines on a regular basis so we can adjust treatment as needed.    -     lisinopril (PRINIVIL/ZESTRIL) 5 MG tablet; Take 1 tablet (5 mg) by mouth daily    Rash  -     triamcinolone (KENALOG) 0.1 % external cream; APPLY SPARINGLY TO AFFECTED AREA TWICE DAILY AS NEEDED    Cerebrovascular accident (CVA) due to embolism of cerebral artery (H)  Long discussion must lower ldl, try   -     pravastatin (PRAVACHOL) 10 MG tablet; Take 1 tablet (10 mg) by mouth daily    High prostate specific antigen (PSA)  Recheck in 4-6 months  -     PSA Serum ([facility]); Future    Other orders  -     PNEUMOCOCCAL VACCINE,ADULT,SQ OR IM  -     ADMIN PNEUMOCOCCAL VACCINE        COUNSELING:  Reviewed preventive health counseling, as reflected in patient instructions       Regular exercise       Healthy diet/nutrition    Estimated body mass index is 25.03 kg/m  as calculated from the following:    Height as of this encounter: 1.695 m (5' 6.75\").    Weight as of this encounter: 71.9 kg (158 lb 9.6 oz).         reports that he has never smoked. He has never used smokeless tobacco.      Appropriate preventive services were discussed with this patient, including applicable screening as appropriate for cardiovascular disease, diabetes, osteopenia/osteoporosis, and glaucoma.  As appropriate for age/gender, " discussed screening for colorectal cancer, prostate cancer, breast cancer, and cervical cancer. Checklist reviewing preventive services available has been given to the patient.    Reviewed patients plan of care and provided an AVS. The Basic Care Plan (routine screening as documented in Health Maintenance) for Aiden meets the Care Plan requirement. This Care Plan has been established and reviewed with the Patient.    Counseling Resources:  ATP IV Guidelines  Pooled Cohorts Equation Calculator  Breast Cancer Risk Calculator  FRAX Risk Assessment  ICSI Preventive Guidelines  Dietary Guidelines for Americans, 2010  USDA's MyPlate  ASA Prophylaxis  Lung CA Screening    Jarret Tay MD  [facility]    Identified Health Risks:

## 2020-01-27 NOTE — PATIENT INSTRUCTIONS
Can I lower my cholesterol by changing my diet? -- Yes, you can lower your cholesterol some if you avoid red meat, butter, fried foods, cheese, and other foods that have a lot of saturated fat. But if you are interested in improving your health, it s best not to focus just on cholesterol. There are changes you can make to your diet that will reduce your risk of heart disease and other problems--even if they don t lower your cholesterol much.   No single diet is right for everyone. But in general, a healthy diet can include:  Lots of fruits, vegetables, and whole grains (examples of whole grains include whole wheat, oats, and barley)   Some beans, peas, lentils, chickpeas, and similar foods   Some nuts, such as walnuts, almonds, and peanuts   Fat-free or low-fat milk and milk products   Some fish  To have a healthy diet, it s also important to limit or avoid sugar, sweets, and refined grains. (Refined grains are found in white bread, white rice, most forms of pasta, and most packaged  snack  foods.)  What about eggs? -- Eggs are OK, but don t overdo it. The news is often littered with stories about the health benefits or risks of eggs. The truth is, eggs are a good source of protein and do not raise cholesterol much, even though they have a lot of cholesterol in them.   Are there specific foods that can lower my cholesterol? -- Maybe. There are some foods that seem to lower cholesterol, but scientists are still not sure. Here are some foods that are being studied:  Foods rich in omega 3 fatty acids - Foods rich in omega 3 fatty acids include oily fish, and olive and canola oil. These foods seem to raise good cholesterol and might lower certain types of bad cholesterol. More important, studies show that people who eat lots of these foods are less likely than those who eat less of them to have heart disease. If you want, it s fine to eat 1 to 2 servings of oily fish a week (such as salmon, herring, or tuna). If you  would like to take fish oil supplements, talk to your doctor or nurse.   Nuts - Some studies show that eating certain nuts, such as walnuts and pistachios, can help lower cholesterol and even the risk of heart attack or death.   Fiber-rich foods - Fiber-rich foods, such as fruits, vegetables, beans, and oats, seem to lower cholesterol and are generally good for your health. Some doctors even recommend fiber supplements.  What about  foods that claim to lower cholesterol? -- Be careful with these foods. There are now many foods that have added plant extracts called  sterols  or  stanols.  Examples include Minute Maid HeartWise orange juice, Danacol yogurt, and special margarines such as Benecol and Promise activ. Foods with added sterols or stanols can lower cholesterol. But it s not clear whether those foods help reduce the risk of heart attack or stroke. Plus, research in animals shows that these extracts might actually cause health problems. Experts think more research is needed before they can recommend that people eat foods with added plant sterols or stanols.   Should I take supplements to lower my cholesterol? -- Maybe. Some research has shown that certain supplements can lower cholesterol. But there is almost no research showing that supplements can help prevent heart attacks, strokes, or any of the problems caused by high cholesterol. If you decide to try supplements, keep in mind that in the [place], the government does not regulate supplements very well. That means that what s on a supplement s label is not always actually in the bottle.   Here are some supplements that might help with cholesterol:   Red yeast rice - This supplement can contain the same ingredient that is in a prescription medicine to lower cholesterol. Red yeast rice helps lower cholesterol, but the products that claim to have it might not always have much of the active ingredient.   Calcium - Some studies show that calcium  supplements can lower cholesterol. But there are no studies showing that they lower the risk of heart attack or stroke. Some studies even suggest that calcium supplements can increase these risks.    Patient Education   Personalized Prevention Plan  You are due for the preventive services outlined below.  Your care team is available to assist you in scheduling these services.  If you have already completed any of these items, please share that information with your care team to update in your medical record.  Health Maintenance Due   Topic Date Due     Zoster (Shingles) Vaccine (2 of 3) 03/23/2016     Discuss Advance Care Planning  01/22/2020     Annual Wellness Visit  01/24/2020     Pneumococcal Vaccine (2 of 2 - PPSV23) 01/24/2020        Patient Education   Personalized Prevention Plan  You are due for the preventive services outlined below.  Your care team is available to assist you in scheduling these services.  If you have already completed any of these items, please share that information with your care team to update in your medical record.  Health Maintenance Due   Topic Date Due     Zoster (Shingles) Vaccine (2 of 3) 03/23/2016     Discuss Advance Care Planning  01/22/2020     Annual Wellness Visit  01/24/2020     Pneumococcal Vaccine (2 of 2 - PPSV23) 01/24/2020

## 2020-04-27 VITALS — TEMPERATURE: 97.9 F

## 2020-04-27 DIAGNOSIS — R97.20 HIGH PROSTATE SPECIFIC ANTIGEN (PSA): ICD-10-CM

## 2020-04-27 DIAGNOSIS — E78.00 HYPERCHOLESTEREMIA: Primary | ICD-10-CM

## 2020-04-27 DIAGNOSIS — I10 ESSENTIAL HYPERTENSION: ICD-10-CM

## 2020-04-27 PROCEDURE — 36415 COLL VENOUS BLD VENIPUNCTURE: CPT

## 2020-04-27 NOTE — PROGRESS NOTES
fasting labs - open orders for psa. pt requested lipid & liver/kidney profile due to new med-Pravastatin    C/o of muscle aches since starting new med  Jocelin Ag MA April 27, 2020 8:26 AM

## 2020-04-28 LAB
ALBUMIN SERPL-MCNC: 4.3 G/DL (ref 3.8–4.8)
ALBUMIN/GLOB SERPL: 1.7 {RATIO} (ref 1.2–2.2)
ALP SERPL-CCNC: 56 IU/L (ref 39–117)
ALT SERPL-CCNC: 8 IU/L (ref 0–44)
AST SERPL-CCNC: 15 IU/L (ref 0–40)
BILIRUB SERPL-MCNC: 0.3 MG/DL (ref 0–1.2)
BUN SERPL-MCNC: 14 MG/DL (ref 8–27)
BUN/CREATININE RATIO: 17 (ref 10–24)
CALCIUM SERPL-MCNC: 9.6 MG/DL (ref 8.6–10.2)
CHLORIDE SERPLBLD-SCNC: 103 MMOL/L (ref 96–106)
CHOLEST SERPL-MCNC: 244 MG/DL (ref 100–199)
CREAT SERPL-MCNC: 0.82 MG/DL (ref 0.76–1.27)
EGFR IF AFRICN AM: 107 ML/MIN/1.73
EGFR IF NONAFRICN AM: 92 ML/MIN/1.73
GLOBULIN, TOTAL: 2.6 G/DL (ref 1.5–4.5)
GLUCOSE SERPL-MCNC: 95 MG/DL (ref 65–99)
HDLC SERPL-MCNC: 51 MG/DL
LDL/HDL RATIO: 3.4 RATIO (ref 0–3.6)
LDLC SERPL CALC-MCNC: 173 MG/DL (ref 0–99)
POTASSIUM SERPL-SCNC: 5 MMOL/L (ref 3.5–5.2)
PROT SERPL-MCNC: 6.9 G/DL (ref 6–8.5)
PSA NG/ML: 4.1 NG/ML (ref 0–4)
SODIUM SERPL-SCNC: 140 MMOL/L (ref 134–144)
TOTAL CO2: 29 MMOL/L (ref 20–29)
TRIGL SERPL-MCNC: 102 MG/DL (ref 0–149)
VLDLC SERPL CALC-MCNC: 20 MG/DL (ref 5–40)

## 2020-04-29 ENCOUNTER — TELEPHONE (OUTPATIENT)
Dept: FAMILY MEDICINE | Facility: CLINIC | Age: 65
End: 2020-04-29

## 2020-04-29 DIAGNOSIS — E78.00 HYPERCHOLESTEREMIA: Primary | ICD-10-CM

## 2020-04-29 NOTE — TELEPHONE ENCOUNTER
Called patient with lab results.  Informed him of declining PSA-this will be rechecked in a year.  Lipids elevated and patient does state he takes his Pravastatin every day.  Per Dr Tay patient should add Zetia 10 mg.  Patient wants to know if Dr Tay would consider putting him back on Fenofibrate that he was on before. Patient states he does not remember any problems with this.  Sent message to Dr Tay to see what he thinks.

## 2020-05-04 RX ORDER — FENOFIBRATE 145 MG/1
145 TABLET, COATED ORAL DAILY
Qty: 90 TABLET | Refills: 0 | Status: SHIPPED | OUTPATIENT
Start: 2020-05-04 | End: 2020-07-16

## 2020-05-04 NOTE — TELEPHONE ENCOUNTER
Per Dr Jasvir BROWER for patient to go back on the Fenofibrate 145 mg.  Called patient to inform him. Prescription sent to pharmacy.  Patient will moraima lipids in 8 weeks.

## 2020-07-02 DIAGNOSIS — E78.00 HYPERCHOLESTEREMIA: ICD-10-CM

## 2020-07-02 PROCEDURE — 36415 COLL VENOUS BLD VENIPUNCTURE: CPT | Performed by: FAMILY MEDICINE

## 2020-07-02 NOTE — LETTER
July 7, 2020      Aiden Valdez  [address]    [place] [address]        Dear Aiden,       I am writing to report that your included test results are within expected ranges. I do not suggest that we make any changes at this time.     Resulted Orders   Lipid Panel ([facility])   Result Value Ref Range    Cholesterol 144 100 - 199 mg/dL    Triglycerides 68 0 - 149 mg/dL    HDL Cholesterol 52 >39 mg/dL    VLDL Cholesterol George 14 5 - 40 mg/dL    LDL Cholesterol Calculated 78 0 - 99 mg/dL    LDL/HDL Ratio 1.5 0.0 - 3.6 ratio      Comment:                                          LDL/HDL Ratio                                              Men  Women                                1/2 Avg.Risk  1.0    1.5                                    Avg.Risk  3.6    3.2                                 2X Avg.Risk  6.2    5.0                                 3X Avg.Risk  8.0    6.1      Narrative    Performed at:  01 - [facility] [address] [place], [place]  [id number]  : Akhil Baxter MD, Phone:  [phone]       If you have any questions or concerns, please call the clinic at the number listed above.       Sincerely,        [facility] [place]

## 2020-07-03 LAB
CHOLEST SERPL-MCNC: 144 MG/DL (ref 100–199)
HDLC SERPL-MCNC: 52 MG/DL
LDL/HDL RATIO: 1.5 RATIO (ref 0–3.6)
LDLC SERPL CALC-MCNC: 78 MG/DL (ref 0–99)
TRIGL SERPL-MCNC: 68 MG/DL (ref 0–149)
VLDLC SERPL CALC-MCNC: 14 MG/DL (ref 5–40)

## 2020-07-16 DIAGNOSIS — E78.00 HYPERCHOLESTEREMIA: ICD-10-CM

## 2020-07-16 RX ORDER — FENOFIBRATE 145 MG/1
TABLET, COATED ORAL
Qty: 90 TABLET | Refills: 0 | Status: SHIPPED | OUTPATIENT
Start: 2020-07-16 | End: 2020-10-13

## 2020-08-17 ENCOUNTER — TRANSFERRED RECORDS (OUTPATIENT)
Dept: FAMILY MEDICINE | Facility: CLINIC | Age: 65
End: 2020-08-17

## 2020-09-11 ENCOUNTER — TRANSFERRED RECORDS (OUTPATIENT)
Dept: FAMILY MEDICINE | Facility: CLINIC | Age: 65
End: 2020-09-11

## 2020-10-13 DIAGNOSIS — E78.00 HYPERCHOLESTEREMIA: ICD-10-CM

## 2020-10-13 RX ORDER — FENOFIBRATE 145 MG/1
TABLET, COATED ORAL
Qty: 90 TABLET | Refills: 0 | Status: SHIPPED | OUTPATIENT
Start: 2020-10-13 | End: 2021-01-14

## 2020-10-13 NOTE — TELEPHONE ENCOUNTER
Due for physical with lipid in January. Fenofibrate refilled 3 months.    NAJMA Carney CNP on 10/13/2020 at 7:51 AM

## 2020-12-16 DIAGNOSIS — I10 ESSENTIAL HYPERTENSION: ICD-10-CM

## 2020-12-16 NOTE — TELEPHONE ENCOUNTER
Lisinopril. Has appt 1/28/21.     BP Readings from Last 3 Encounters:   01/27/20 136/80   01/24/19 132/80   10/11/18 130/88

## 2020-12-17 RX ORDER — LISINOPRIL 5 MG/1
5 TABLET ORAL DAILY
Qty: 90 TABLET | Refills: 0 | Status: SHIPPED | OUTPATIENT
Start: 2020-12-17 | End: 2021-01-28

## 2020-12-18 DIAGNOSIS — I63.40 CEREBROVASCULAR ACCIDENT (CVA) DUE TO EMBOLISM OF CEREBRAL ARTERY (H): ICD-10-CM

## 2020-12-18 RX ORDER — PRAVASTATIN SODIUM 10 MG
10 TABLET ORAL DAILY
Qty: 30 TABLET | Refills: 1 | Status: SHIPPED | OUTPATIENT
Start: 2020-12-18 | End: 2021-01-11

## 2020-12-18 NOTE — TELEPHONE ENCOUNTER
Lisinopril. Last addressed 1/15/20. Due for AWV next month.     Cholesterol   Date Value Ref Range Status   07/02/2020 144 100 - 199 mg/dL Final   04/27/2020 244 (H) 100 - 199 mg/dL Final     HDL Cholesterol   Date Value Ref Range Status   07/02/2020 52 >39 mg/dL Final   04/27/2020 51 >39 mg/dL Final     LDL Cholesterol Calculated   Date Value Ref Range Status   07/02/2020 78 0 - 99 mg/dL Final   04/27/2020 173 (H) 0 - 99 mg/dL Final     Triglycerides   Date Value Ref Range Status   07/02/2020 68 0 - 149 mg/dL Final   04/27/2020 102 0 - 149 mg/dL Final     No results found for: CHOLHDLRATIO

## 2021-01-10 DIAGNOSIS — I63.40 CEREBROVASCULAR ACCIDENT (CVA) DUE TO EMBOLISM OF CEREBRAL ARTERY (H): ICD-10-CM

## 2021-01-11 RX ORDER — PRAVASTATIN SODIUM 10 MG
TABLET ORAL
Qty: 30 TABLET | Refills: 1 | Status: SHIPPED | OUTPATIENT
Start: 2021-01-11 | End: 2021-01-28

## 2021-01-14 DIAGNOSIS — E78.00 HYPERCHOLESTEREMIA: ICD-10-CM

## 2021-01-14 NOTE — TELEPHONE ENCOUNTER
Fenofibrate. Last AWV 1/27/20. Due 1/27/21 for AWV.     Cholesterol   Date Value Ref Range Status   07/02/2020 144 100 - 199 mg/dL Final   04/27/2020 244 (H) 100 - 199 mg/dL Final     HDL Cholesterol   Date Value Ref Range Status   07/02/2020 52 >39 mg/dL Final   04/27/2020 51 >39 mg/dL Final     LDL Cholesterol Calculated   Date Value Ref Range Status   07/02/2020 78 0 - 99 mg/dL Final   04/27/2020 173 (H) 0 - 99 mg/dL Final     Triglycerides   Date Value Ref Range Status   07/02/2020 68 0 - 149 mg/dL Final   04/27/2020 102 0 - 149 mg/dL Final     No results found for: CHOLHDLRATIO

## 2021-01-15 RX ORDER — FENOFIBRATE 145 MG/1
145 TABLET, COATED ORAL DAILY
Qty: 90 TABLET | Refills: 0 | Status: SHIPPED | OUTPATIENT
Start: 2021-01-15 | End: 2021-01-28

## 2021-01-21 DIAGNOSIS — R97.20 HIGH PROSTATE SPECIFIC ANTIGEN (PSA): ICD-10-CM

## 2021-01-21 DIAGNOSIS — I10 ESSENTIAL HYPERTENSION: ICD-10-CM

## 2021-01-21 DIAGNOSIS — E78.00 HYPERCHOLESTEREMIA: Primary | ICD-10-CM

## 2021-01-21 LAB
% GRANULOCYTES: 65.2 % (ref 42.2–75.2)
HCT VFR BLD AUTO: 40.8 % (ref 39–51)
HEMOGLOBIN: 13.3 G/DL (ref 13.4–17.5)
LYMPHOCYTES NFR BLD AUTO: 26.3 % (ref 20.5–51.1)
MCH RBC QN AUTO: 29.1 PG (ref 27–31)
MCHC RBC AUTO-ENTMCNC: 32.7 G/DL (ref 33–37)
MCV RBC AUTO: 89.2 FL (ref 80–100)
MONOCYTES NFR BLD AUTO: 8.5 % (ref 1.7–9.3)
PLATELET # BLD AUTO: 292 K/UL (ref 140–450)
RBC # BLD AUTO: 4.57 X10/CMM (ref 4.2–5.9)
WBC # BLD AUTO: 4.9 X10/CMM (ref 3.8–11)

## 2021-01-21 PROCEDURE — 36415 COLL VENOUS BLD VENIPUNCTURE: CPT | Performed by: FAMILY MEDICINE

## 2021-01-21 PROCEDURE — 85025 COMPLETE CBC W/AUTO DIFF WBC: CPT | Performed by: FAMILY MEDICINE

## 2021-01-21 NOTE — LETTER
[facility] [address] [place]  [address]  Phone: [phone]    January 25, 2021      Aiden Courtney  [address]    [place] [address]              Dear Aiden,   I am writing to report that your included test results are within expected ranges. I do not suggest that we make any changes at this time.  Jarret Tay M.D.       Results for orders placed or performed in visit on 01/21/21   Comp. Metabolic Panel (14) ([facility])     Status: Abnormal   Result Value Ref Range    Glucose 90 65 - 99 mg/dL    Urea Nitrogen 13 8 - 27 mg/dL    Creatinine 0.81 0.76 - 1.27 mg/dL    eGFR If NonAfricn Am 92 >59 mL/min/1.73    eGFR If Africn Am 106 >59 mL/min/1.73    BUN/Creatinine Ratio 16 10 - 24    Sodium 140 134 - 144 mmol/L    Potassium 4.2 3.5 - 5.2 mmol/L    Chloride 102 96 - 106 mmol/L    Total CO2 27 20 - 29 mmol/L    Calcium 9.8 8.6 - 10.2 mg/dL    Protein Total 6.8 6.0 - 8.5 g/dL    Albumin 4.5 3.8 - 4.8 g/dL    Globulin, Total 2.3 1.5 - 4.5 g/dL    A/G Ratio 2.0 1.2 - 2.2    Bilirubin Total 0.4 0.0 - 1.2 mg/dL    Alkaline Phosphatase 36 (L) 39 - 117 IU/L    AST 20 0 - 40 IU/L    ALT 11 0 - 44 IU/L    Narrative    Performed at:  01 - [facility] [address], [place], [place]  [id number]  : Akhil Baxter MD, Phone:  [phone]   Lipid Panel ([facility])     Status: None   Result Value Ref Range    Cholesterol 157 100 - 199 mg/dL    Triglycerides 81 0 - 149 mg/dL    HDL Cholesterol 49 >39 mg/dL    VLDL Cholesterol George 15 5 - 40 mg/dL    LDL Cholesterol Calculated 93 0 - 99 mg/dL    LDL/HDL Ratio 1.9 0.0 - 3.6 ratio    Narrative    Performed at:  01 - [facility] [address], [place], [place]  [id number]  : Akhil Baxter MD, Phone:  [phone]   CBC with Diff/Plt ([facility])     Status: Abnormal   Result Value Ref Range    WBC x10/cmm 4.9 3.8 - 11.0 x10/cmm    % Lymphocytes 26.3 20.5 - 51.1 %    % Monocytes 8.5 1.7 - 9.3 %    % Granulocytes 65.2 42.2  - 75.2 %    RBC x10/cmm 4.57 4.2 - 5.9 x10/cmm    Hemoglobin 13.3 (A) 13.4 - 17.5 g/dl    Hematocrit 40.8 39 - 51 %    MCV 89.2 80 - 100 fL    MCH 29.1 27.0 - 31.0 pg    MCHC 32.7 (A) 33.0 - 37.0 g/dL    Platelet Count 292 140 - 450 K/uL   PSA Serum ([facility])     Status: Abnormal   Result Value Ref Range    PSA NG/ML 4.4 (H) 0.0 - 4.0 ng/mL    Narrative    Performed at:  01 - [facility] [address], [place], [place]  [id number]  : Akhil Baxter MD, Phone:  [phone]

## 2021-01-22 LAB
ALBUMIN SERPL-MCNC: 4.5 G/DL (ref 3.8–4.8)
ALBUMIN/GLOB SERPL: 2 {RATIO} (ref 1.2–2.2)
ALP SERPL-CCNC: 36 IU/L (ref 39–117)
ALT SERPL-CCNC: 11 IU/L (ref 0–44)
AST SERPL-CCNC: 20 IU/L (ref 0–40)
BILIRUB SERPL-MCNC: 0.4 MG/DL (ref 0–1.2)
BUN SERPL-MCNC: 13 MG/DL (ref 8–27)
BUN/CREATININE RATIO: 16 (ref 10–24)
CALCIUM SERPL-MCNC: 9.8 MG/DL (ref 8.6–10.2)
CHLORIDE SERPLBLD-SCNC: 102 MMOL/L (ref 96–106)
CHOLEST SERPL-MCNC: 157 MG/DL (ref 100–199)
CREAT SERPL-MCNC: 0.81 MG/DL (ref 0.76–1.27)
EGFR IF AFRICN AM: 106 ML/MIN/1.73
EGFR IF NONAFRICN AM: 92 ML/MIN/1.73
GLOBULIN, TOTAL: 2.3 G/DL (ref 1.5–4.5)
GLUCOSE SERPL-MCNC: 90 MG/DL (ref 65–99)
HDLC SERPL-MCNC: 49 MG/DL
LDL/HDL RATIO: 1.9 RATIO (ref 0–3.6)
LDLC SERPL CALC-MCNC: 93 MG/DL (ref 0–99)
POTASSIUM SERPL-SCNC: 4.2 MMOL/L (ref 3.5–5.2)
PROT SERPL-MCNC: 6.8 G/DL (ref 6–8.5)
PSA NG/ML: 4.4 NG/ML (ref 0–4)
SODIUM SERPL-SCNC: 140 MMOL/L (ref 134–144)
TOTAL CO2: 27 MMOL/L (ref 20–29)
TRIGL SERPL-MCNC: 81 MG/DL (ref 0–149)
VLDLC SERPL CALC-MCNC: 15 MG/DL (ref 5–40)

## 2021-01-28 ENCOUNTER — OFFICE VISIT (OUTPATIENT)
Dept: FAMILY MEDICINE | Facility: CLINIC | Age: 66
End: 2021-01-28

## 2021-01-28 VITALS
HEART RATE: 71 BPM | SYSTOLIC BLOOD PRESSURE: 120 MMHG | HEIGHT: 66 IN | RESPIRATION RATE: 16 BRPM | DIASTOLIC BLOOD PRESSURE: 72 MMHG | BODY MASS INDEX: 23.95 KG/M2 | WEIGHT: 149 LBS | OXYGEN SATURATION: 99 % | TEMPERATURE: 97.8 F

## 2021-01-28 DIAGNOSIS — E78.00 HYPERCHOLESTEREMIA: ICD-10-CM

## 2021-01-28 DIAGNOSIS — I63.40 CEREBROVASCULAR ACCIDENT (CVA) DUE TO EMBOLISM OF CEREBRAL ARTERY (H): ICD-10-CM

## 2021-01-28 DIAGNOSIS — I10 ESSENTIAL HYPERTENSION: ICD-10-CM

## 2021-01-28 DIAGNOSIS — Z00.00 ROUTINE GENERAL MEDICAL EXAMINATION AT A HEALTH CARE FACILITY: Primary | ICD-10-CM

## 2021-01-28 DIAGNOSIS — Z23 HIGH PRIORITY FOR COVID-19 VIRUS VACCINATION: ICD-10-CM

## 2021-01-28 DIAGNOSIS — R97.20 HIGH PROSTATE SPECIFIC ANTIGEN (PSA): ICD-10-CM

## 2021-01-28 DIAGNOSIS — I69.30 LATE EFFECT OF CEREBROVASCULAR ACCIDENT (CVA): ICD-10-CM

## 2021-01-28 PROBLEM — I63.9 STROKE (CEREBRUM) (H): Status: RESOLVED | Noted: 2017-05-18 | Resolved: 2021-01-28

## 2021-01-28 PROBLEM — I63.539: Status: ACTIVE | Noted: 2017-05-18

## 2021-01-28 PROBLEM — I63.9 STROKE (H): Status: RESOLVED | Noted: 2017-05-18 | Resolved: 2021-01-28

## 2021-01-28 PROCEDURE — 91301 PR COVID VAC MODERNA 100 MCG/0.5 ML IM: CPT | Performed by: FAMILY MEDICINE

## 2021-01-28 PROCEDURE — 0011A PR COVID VAC MODERNA 100 MCG/0.5 ML IM: CPT | Performed by: FAMILY MEDICINE

## 2021-01-28 PROCEDURE — 99397 PER PM REEVAL EST PAT 65+ YR: CPT | Performed by: FAMILY MEDICINE

## 2021-01-28 PROCEDURE — 99214 OFFICE O/P EST MOD 30 MIN: CPT | Mod: 25 | Performed by: FAMILY MEDICINE

## 2021-01-28 RX ORDER — LISINOPRIL 5 MG/1
5 TABLET ORAL DAILY
Qty: 90 TABLET | Refills: 3 | Status: SHIPPED | OUTPATIENT
Start: 2021-01-28 | End: 2021-07-21

## 2021-01-28 RX ORDER — PRAVASTATIN SODIUM 10 MG
10 TABLET ORAL DAILY
Qty: 90 TABLET | Refills: 3 | Status: SHIPPED | OUTPATIENT
Start: 2021-01-28 | End: 2021-07-21

## 2021-01-28 RX ORDER — FENOFIBRATE 145 MG/1
145 TABLET, COATED ORAL DAILY
Qty: 90 TABLET | Refills: 3 | Status: SHIPPED | OUTPATIENT
Start: 2021-01-28 | End: 2021-07-21

## 2021-01-28 ASSESSMENT — MIFFLIN-ST. JEOR: SCORE: 1397.58

## 2021-01-28 NOTE — PATIENT INSTRUCTIONS
Preventive Health Recommendations:     See your health care provider every year to    Review health changes.     Discuss preventive care.      Review your medicines if your doctor has prescribed any.      Talk with your health care provider about whether you should have a test to screen for prostate cancer (PSA).    Every 3 years, have a diabetes test (fasting glucose). If you are at risk for diabetes, you should have this test more often.    Every 5 years, have a cholesterol test. Have this test more often if you are at risk for high cholesterol or heart disease.     Every 10 years, have a colonoscopy. Or, have a yearly FIT test (stool test). These exams will check for colon cancer.    Talk to with your health care provider about screening for Abdominal Aortic Aneurysm if you have a family history of AAA or have a history of smoking.    Shots:     Get a flu shot each year.     Get a tetanus shot every 10 years.     Talk to your doctor about your pneumonia vaccines. There are now two you should receive - Pneumovax (PPSV 23) and Prevnar (PCV 13).     Talk to your pharmacist about a shingles vaccine.     Talk to your doctor about the hepatitis B vaccine.  Nutrition:     Eat at least 5 servings of fruits and vegetables each day.     Eat whole-grain bread, whole-wheat pasta and brown rice instead of white grains and rice.     Get adequate Calcium and Vitamin D.   Lifestyle    Exercise for at least 150 minutes a week (30 minutes a day, 5 days a week). This will help you control your weight and prevent disease.     Limit alcohol to one drink per day.     No smoking.     Wear sunscreen to prevent skin cancer.    See your dentist every six months for an exam and cleaning.    See your eye doctor every 1 to 2 years to screen for conditions such as glaucoma, macular degeneration, cataracts, etc.    Personalized Prevention Plan  You are due for the preventive services outlined below.  Your care team is available to assist you  in scheduling these services.  If you have already completed any of these items, please share that information with your care team to update in your medical record.  Health Maintenance Due   Topic Date Due     PHQ-2  01/01/2021     FALL RISK ASSESSMENT  01/20/2021

## 2021-01-28 NOTE — PROGRESS NOTES
3  SUBJECTIVE:   CC: Aiden Valdez is an 65 year old male who presents for preventive health visit.   Wants covid vacc!  No other significant past medical history or family history. Further stroke symptoms, still can't see out of part of the eye.    Hyperlipidemia:  Has history of hyperlipidemia.    The patient is  taking a medication for this.  Denies any significant side effects from his medication.      Latest labs reviewed:    Recent Labs   Lab Test 01/21/21  0805 07/02/20  0845   CHOL 157 144   HDL 49 52   LDL 93 78   TRIG 81 68        Lab Results   Component Value Date    AST 20 01/21/2021        Essential Hypertension   Remains well controlled when checked out of clinic.   he has not experienced any significant side effects from medications for hypertension.    NO active cardiac complaints or symptoms with exercise.  Current medications for treatment:  ACE inhibitors (Lisinopril, Ramipril,Captopril,Benazepril)    Reviewed last 6 BP readings in chart:  BP Readings from Last 6 Encounters:   01/28/21 120/72   01/27/20 136/80   01/24/19 132/80   10/11/18 130/88   05/07/18 112/68   01/22/18 110/74         Patient has been advised of split billing requirements and indicates understanding: Yes  Healthy Habits:    Do you get at least three servings of calcium containing foods daily (dairy, green leafy vegetables, etc.)? yes    Amount of exercise or daily activities, outside of work: 5 day(s) per week    Problems taking medications regularly No    Medication side effects: No    Have you had an eye exam in the past two years? yes    Do you see a dentist twice per year? yes    Do you have sleep apnea, excessive snoring or daytime drowsiness? Sleep apnea but not using machine - was not working for him    Today's PHQ-2 Score:   PHQ-2 ( 1999 Pfizer) 1/20/2020 1/24/2019   Q1: Little interest or pleasure in doing things 0 0   Q2: Feeling down, depressed or hopeless 0 0   PHQ-2 Score 0 0       Abuse: Current or  Past(Physical, Sexual or Emotional)- No  Do you feel safe in your environment? Yes    Social History     Tobacco Use     Smoking status: Never Smoker     Smokeless tobacco: Never Used   Substance Use Topics     Alcohol use: Yes     Comment: 1-3 week     If you drink alcohol do you typically have >3 drinks per day or >7 drinks per week? No                      Last PSA: No results found for: PSA    Reviewed orders with patient. Reviewed health maintenance and updated orders accordingly - Yes  Patient Active Problem List   Diagnosis     Hypercholesteremia     Advanced directives, counseling/discussion     Family history of prostate cancer     Essential hypertension     Headache, unspecified headache type     Acute arterial ischemic stroke, multifocal, posterior circulation (H)     High prostate specific antigen (PSA)     Vision loss in left eye as late effect of cerebrovascular accident (CVA)     Past Surgical History:   Procedure Laterality Date     EXTRACAPSULAR CATARACT EXTRATION WITH INTRAOCULAR LENS IMPLANT       PROSTATE BIOPSY, NEEDLE, SATURATION SAMPLING         Social History     Tobacco Use     Smoking status: Never Smoker     Smokeless tobacco: Never Used   Substance Use Topics     Alcohol use: Yes     Comment: 1-3 week     Family History   Problem Relation Age of Onset     Respiratory Father      C.A.D. Mother      Cerebrovascular Disease Mother      Hypertension Mother      Prostate Cancer Brother            Reviewed and updated as needed this visit by clinical staff  Tobacco  Allergies  Meds              Reviewed and updated as needed this visit by Provider                    ROS:  CONSTITUTIONAL: NEGATIVE for fever, chills, change in weight  INTEGUMENTARY/SKIN: NEGATIVE for worrisome rashes, moles or lesions  EYES: NEGATIVE for vision changes or irritation  ENT: NEGATIVE for ear, mouth and throat problems  RESP: NEGATIVE for significant cough or SOB  CV: NEGATIVE for chest pain, palpitations or  peripheral edema  GI: NEGATIVE for nausea, abdominal pain, heartburn, or change in bowel habits   male: negative for dysuria, hematuria, decreased urinary stream, erectile dysfunction, urethral discharge  MUSCULOSKELETAL: NEGATIVE for significant arthralgias or myalgia  NEURO: NEGATIVE for weakness, dizziness or paresthesias  PSYCHIATRIC: NEGATIVE for changes in mood or affect    OBJECTIVE:   There were no vitals taken for this visit.  EXAM:  GENERAL: healthy, alert and no distress  EYES: Eyes grossly normal to inspection, PERRL and conjunctivae and sclerae normal  HENT: ear canals and TM's normal, nose and mouth without ulcers or lesions  NECK: no adenopathy, no asymmetry, masses, or scars and thyroid normal to palpation  RESP: lungs clear to auscultation - no rales, rhonchi or wheezes  CV: regular rate and rhythm, normal S1 S2, no S3 or S4, no murmur, click or rub, no peripheral edema and peripheral pulses strong  ABDOMEN: soft, nontender, no hepatosplenomegaly, no masses and bowel sounds normal  RECTAL: normal sphincter tone, no rectal masses, prostate normal size, smooth, nontender without nodules or masses  MS: no gross musculoskeletal defects noted, no edema  SKIN: no suspicious lesions or rashes  NEURO: Normal strength and tone, mentation intact and speech normal  PSYCH: mentation appears normal, affect normal/bright    Diagnostic Test Results:  Labs reviewed in Epic    ASSESSMENT/PLAN:   Aiden was seen today for physical and back pain.    Diagnoses and all orders for this visit:    Routine general medical examination at a health care facility    High priority for COVID-19 virus vaccination  -     NE COVID VAC MODERNA 100 MCG/0.5 ML IM  -     NE ADMIN COVID VAC MODERNA IM, 1ST DOSE    Vision loss in left eye as late effect of cerebrovascular accident (CVA)  Risking management to lower risks.  Hypercholesteremia  Hyperlipidemia:  Has history of hyperlipidemia.    The patient is  taking a medication for this.   "Denies any significant side effects from his medication.      Latest labs reviewed:    Recent Labs   Lab Test 01/21/21  0805 07/02/20  0845   CHOL 157 144   HDL 49 52   LDL 93 78   TRIG 81 68        Lab Results   Component Value Date    AST 20 01/21/2021        -     fenofibrate (TRICOR) 145 MG tablet; Take 1 tablet (145 mg) by mouth daily    Essential hypertension  -     lisinopril (ZESTRIL) 5 MG tablet; Take 1 tablet (5 mg) by mouth daily    High prostate specific antigen (PSA)    Cerebrovascular accident (CVA) due to embolism of cerebral artery (H)  -     pravastatin (PRAVACHOL) 10 MG tablet; Take 1 tablet (10 mg) by mouth daily        Patient has been advised of split billing requirements and indicates understanding: Yes  COUNSELING:  Reviewed preventive health counseling, as reflected in patient instructions       Regular exercise       Healthy diet/nutrition    Estimated body mass index is 25.03 kg/m  as calculated from the following:    Height as of 1/27/20: 1.695 m (5' 6.75\").    Weight as of 1/27/20: 71.9 kg (158 lb 9.6 oz).        He reports that he has never smoked. He has never used smokeless tobacco.      Counseling Resources:  ATP IV Guidelines  Pooled Cohorts Equation Calculator  FRAX Risk Assessment  ICSI Preventive Guidelines  Dietary Guidelines for Americans, 2010  USDA's MyPlate  ASA Prophylaxis  Lung CA Screening    Jarret Tay MD  [facility]  "

## 2021-02-18 ENCOUNTER — TRANSFERRED RECORDS (OUTPATIENT)
Dept: FAMILY MEDICINE | Facility: CLINIC | Age: 66
End: 2021-02-18

## 2021-02-24 ENCOUNTER — TELEPHONE (OUTPATIENT)
Dept: FAMILY MEDICINE | Facility: CLINIC | Age: 66
End: 2021-02-24

## 2021-02-24 ENCOUNTER — OFFICE VISIT (OUTPATIENT)
Dept: URGENT CARE | Facility: URGENT CARE | Age: 66
End: 2021-02-24
Attending: FAMILY MEDICINE
Payer: COMMERCIAL

## 2021-02-24 DIAGNOSIS — Z20.822 EXPOSURE TO COVID-19 VIRUS: Primary | ICD-10-CM

## 2021-02-24 DIAGNOSIS — Z20.822 EXPOSURE TO COVID-19 VIRUS: ICD-10-CM

## 2021-02-24 LAB
SARS-COV-2 RNA RESP QL NAA+PROBE: NORMAL
SPECIMEN SOURCE: NORMAL

## 2021-02-24 PROCEDURE — U0005 INFEC AGEN DETEC AMPLI PROBE: HCPCS | Performed by: FAMILY MEDICINE

## 2021-02-24 PROCEDURE — U0003 INFECTIOUS AGENT DETECTION BY NUCLEIC ACID (DNA OR RNA); SEVERE ACUTE RESPIRATORY SYNDROME CORONAVIRUS 2 (SARS-COV-2) (CORONAVIRUS DISEASE [COVID-19]), AMPLIFIED PROBE TECHNIQUE, MAKING USE OF HIGH THROUGHPUT TECHNOLOGIES AS DESCRIBED BY CMS-2020-01-R: HCPCS | Performed by: FAMILY MEDICINE

## 2021-02-24 NOTE — CONFIDENTIAL NOTE
Patient calls reporting his long-term girlfriend tested positive for COVID19 on 2/22/21 when in ER. She has end-stage pancreatic cancer and was sent home on hospice. She had first Pfizer vaccine 3 weeks ago. She has no COVID symptoms and neither does patient. MD called her yesterday and advised of positive result and to quarantine x 10 days.   Patient had his first COVID vaccine 2/25 in our clinic and due for second tomorrow. He asks if should still get second shot as scheduled or wait? Also what to do regarding his exposure to girlfriend on daily basis?    Plan: per Dr. Lerner (oncall MD) - recommend PCR COVID testing be done now. Should quarantine for now until result sheds more light on quarantine time. PCR test order placed. Patient will call [facility] scheduling to set up test asap.   Kyleigh Taylor RN

## 2021-02-25 ENCOUNTER — TELEPHONE (OUTPATIENT)
Dept: PEDIATRICS | Facility: CLINIC | Age: 66
End: 2021-02-25

## 2021-02-25 LAB
LABORATORY COMMENT REPORT: ABNORMAL
SARS-COV-2 RNA RESP QL NAA+PROBE: POSITIVE
SPECIMEN SOURCE: ABNORMAL

## 2021-02-25 NOTE — TELEPHONE ENCOUNTER
Coronavirus (COVID-19) Notification    Reason for call  Notify of POSITIVE  COVID-19 lab result, assess symptoms,  review [facility] recommendations    Lab Result   Lab test for 2019-nCoV rRt-PCR or SARS-COV-2 PCR  Oropharyngeal AND/OR nasopharyngeal swabs were POSITIVE for 2019-nCoV RNA [OR] SARS-COV-2 RNA (COVID-19) RNA     We have been unable to reach Patient by phone at this time to notify of their Positive COVID-19 result.  Left voicemail message requesting a call back to [phone] [facility] for results.        POSITIVE COVID-19 Letter sent.    Yeni Wilson LPN

## 2021-02-25 NOTE — TELEPHONE ENCOUNTER
"-Coronavirus (COVID-19) Notification    Pt called in to ask why we called, informed him we were trying to relay his results for covid which is positive. Pt was confused as he didn't have any sx, but will reach out to work.     Caller Name (Patient, parent, daughter/son, grandparent, etc)  Aiden Gavelan    Reason for call  Notify of Positive Coronavirus (COVID-19) lab results, assess symptoms,  review [facility] recommendations    Lab Result    Lab test:  2019-nCoV rRt-PCR or SARS-CoV-2 PCR    Oropharyngeal AND/OR nasopharyngeal swabs is POSITIVE for 2019-nCoV RNA/SARS-COV-2 PCR (COVID-19 virus)    RN Recommendations/Instructions per [facility] Coronavirus COVID-19 recommendations    Brief introduction script  Introduce self then review script:  \"I am calling on behalf of [employer].  We were notified that your Coronavirus test (COVID-19) for was POSITIVE for the virus.  I have some information to relay to you but first I wanted to mention that the [place] Dept of Health will be contacting you shortly [it's possible MD already called Patient] to talk to you more about how you are feeling and other people you have had contact with who might now also have the virus.  Also, [facility] is Partnering with the [facility] for Covid-19 research, you may be contacted directly by research staff.\"    Assessment (Inquire about Patient's current symptoms)   Assessment   Current Symptoms at time of phone call: (if no symptoms, document No symptoms] none   Symptoms onset (if applicable) n/a     If at time of call, Patients symptoms hare worsened, the Patient should contact 911 or have someone drive them to Emergency Dept promptly:      If Patient calling 911, inform 911 personal that you have tested positive for the Coronavirus (COVID-19).  Place mask on and await 911 to arrive.    If Emergency Dept, If possible, please have another adult drive you to the Emergency Dept but you need to wear mask when in " "contact with other people.      Monoclonal Antibody Administration    You may be eligible to receive a new treatment with a monoclonal antibody for preventing hospitalization in patients at high risk for complications from COVID-19.   This medication is still experimental and available on a limited basis; it is given through an IV and must be given at an infusion center. Please note that not all people who are eligible will receive the medication since it is in limited supply.     Are you interested in being considered for this medication?  No.   Does the patient fit the criteria: Patient declined    If patient qualifies based on above criteria:  \"We will contact you if you are selected to receive the medication in the next 1-2 days.   This is time sensitive and if you are not selected in the next 1-2 days, you will not receive the medication.  If you do not receive a call to schedule, you have not been selected.\"    Review information with Patient    Your result was positive. This means you have COVID-19 (coronavirus).  We have sent you a letter that reviews the information that I'll be reviewing with you now.    How can I protect others?    If you have symptoms: stay home and away from others (self-isolate) until:    You've had no fever--and no medicine that reduces fever--for 1 full day (24 hours). And       Your other symptoms have gotten better. For example, your cough or breathing has improved. And     At least 10 days have passed since your symptoms started. (If you've been told by a doctor that you have a weak immune system, wait 20 days.)     If you don't have symptoms: Stay home and away from others (self-isolate) until at least 10 days have passed since your first positive COVID-19 test. (Date test collected)    During this time:    Stay in your own room, including for meals. Use your own bathroom if you can.    Stay away from others in your home. No hugging, kissing or shaking hands. No visitors. "     Don't go to work, school or anywhere else.     Clean  high touch  surfaces often (doorknobs, counters, handles, etc.). Use a household cleaning spray or wipes. You'll find a full list on the EPA website at www.epa.gov/pesticide-registration/list-n-disinfectants-use-against-sars-cov-2.     Cover your mouth and nose with a mask, tissue or other face covering to avoid spreading germs.    Wash your hands and face often with soap and water.    Make a list of people you have been in close contact with recently, even if either of you wore a face covering.   ; Start your list from 2 days before you became ill or had a positive test.  ; Include anyone that was within 6 feet of you for a cumulative total of 15 minutes or more in 24 hours. (Example: if you sat next to Tamir for 5 minutes in the morning and 10 minutes in the afternoon, then you were in close contact for 15 minutes total that day. Tamir would be added to your list.)    A public health worker will call or text you. It is important that you answer. They will ask you questions about possible exposures to COVID-19, such as people you have been in direct contact with and places you have visited.    Tell the people on your list that you have COVID-19; they should stay away from others for 14 days starting from the last time they were in contact with you (unless you are told something different from a public health worker).     Caregivers in these groups are at risk for severe illness due to COVID-19:  o People 65 years and older  o People who live in a nursing home or long-term care facility  o People with chronic disease (lung, heart, cancer, diabetes, kidney, liver, immunologic)  o People who have a weakened immune system, including those who:  - Are in cancer treatment  - Take medicine that weakens the immune system, such as corticosteroids  - Had a bone marrow or organ transplant  - Have an immune deficiency  - Have poorly controlled HIV or AIDS  - Are obese  (body mass index of 40 or higher)  - Smoke regularly    Caregivers should wear gloves while washing dishes, handling laundry and cleaning bedrooms and bathrooms.    Wash and dry laundry with special caution. Don't shake dirty laundry, and use the warmest water setting you can.    If you have a weakened immune system, ask your doctor about other actions you should take.    For more tips, go to www.cdc.gov/coronavirus/2019-ncov/downloads/10Things.pdf.    You should not go back to work until you meet the guidelines above for ending your home isolation. You don't need to be retested for COVID-19 before going back to work--studies show that you won't spread the virus if it's been at least 10 days since your symptoms started (or 20 days, if you have a weak immune system).    Employers: This document serves as formal notice of your employee's medical guidelines for going back to work. They must meet the above guidelines before going back to work in person.    How can I take care of myself?    1. Get lots of rest. Drink extra fluids (unless a doctor has told you not to).    2. Take Tylenol (acetaminophen) for fever or pain. If you have liver or kidney problems, ask your family doctor if it's okay to take Tylenol.     Take either:     650 mg (two 325 mg pills) every 4 to 6 hours, or     1,000 mg (two 500 mg pills) every 8 hours as needed.     Note: Don't take more than 3,000 mg in one day. Acetaminophen is found in many medicines (both prescribed and over-the-counter medicines). Read all labels to be sure you don't take too much.    For children, check the Tylenol bottle for the right dose (based on their age or weight).    3. If you have other health problems (like cancer, heart failure, an organ transplant or severe kidney disease): Call your specialty clinic if you don't feel better in the next 2 days.    4. Know when to call 911: Emergency warning signs include:    Trouble breathing or shortness of breath    Pain or  pressure in the chest that doesn't go away    Feeling confused like you haven't felt before, or not being able to wake up    Bluish-colored lips or face    5. Sign up for "[employer] We know it's scary to hear that you have COVID-19. We want to track your symptoms to make sure you're okay over the next 2 weeks. Please look for an email from "[employer]"--this is a free, online program that we'll use to keep in touch. To sign up, follow the link in the email. Learn more at [email or web address].    Where can I get more information?    [facility]: [email or web address]    Coronavirus Basics: www.health.[facility].[place]/diseases/coronavirus/basics.html    What to Do If You're Sick: www.cdc.gov/coronavirus/2019-ncov/about/steps-when-sick.html    Ending Home Isolation: www.cdc.gov/coronavirus/2019-ncov/hcp/disposition-in-home-patients.html     Caring for Someone with COVID-19: www.cdc.gov/coronavirus/2019-ncov/if-you-are-sick/care-for-someone.html     [facility] clinical trials (COVID-19 research studies): clinicalaffairs.[facility]/n-clinical-trials     A Positive COVID-19 letter will be sent via [employer] or the mail. (Exception, no letters sent to Presurgerical/Preprocedure Patients)    Eleonora Brown

## 2021-03-03 ENCOUNTER — DOCUMENTATION ONLY (OUTPATIENT)
Dept: OTHER | Facility: CLINIC | Age: 66
End: 2021-03-03

## 2021-03-09 DIAGNOSIS — Z23 HIGH PRIORITY FOR COVID-19 VIRUS VACCINATION: Primary | ICD-10-CM

## 2021-03-09 PROCEDURE — 91301 PR COVID VAC MODERNA 100 MCG/0.5 ML IM: CPT | Performed by: FAMILY MEDICINE

## 2021-03-09 PROCEDURE — 0012A PR COVID VAC MODERNA 100 MCG/0.5 ML IM: CPT | Performed by: FAMILY MEDICINE

## 2021-06-18 ENCOUNTER — OFFICE VISIT (OUTPATIENT)
Dept: FAMILY MEDICINE | Facility: CLINIC | Age: 66
End: 2021-06-18

## 2021-06-18 VITALS
TEMPERATURE: 98.1 F | BODY MASS INDEX: 23.77 KG/M2 | HEART RATE: 72 BPM | SYSTOLIC BLOOD PRESSURE: 124 MMHG | DIASTOLIC BLOOD PRESSURE: 88 MMHG | OXYGEN SATURATION: 98 % | WEIGHT: 148.4 LBS | RESPIRATION RATE: 16 BRPM

## 2021-06-18 DIAGNOSIS — R21 RASH: ICD-10-CM

## 2021-06-18 DIAGNOSIS — H66.001 NON-RECURRENT ACUTE SUPPURATIVE OTITIS MEDIA OF RIGHT EAR WITHOUT SPONTANEOUS RUPTURE OF TYMPANIC MEMBRANE: Primary | ICD-10-CM

## 2021-06-18 PROCEDURE — 99214 OFFICE O/P EST MOD 30 MIN: CPT | Performed by: FAMILY MEDICINE

## 2021-06-18 RX ORDER — TRIAMCINOLONE ACETONIDE 1 MG/G
CREAM TOPICAL
Qty: 80 G | Refills: 1 | Status: SHIPPED | OUTPATIENT
Start: 2021-06-18 | End: 2021-12-30

## 2021-06-18 RX ORDER — AZITHROMYCIN 250 MG/1
TABLET, FILM COATED ORAL
Qty: 6 TABLET | Refills: 0 | Status: SHIPPED | OUTPATIENT
Start: 2021-06-18 | End: 2021-06-23

## 2021-06-18 NOTE — PROGRESS NOTES
SUBJECTIVE:   Aiden Valdez  is a 65 year old  year old male presenting with a complaint of right ear pain  Quality: aching, dull and shooting  Severity: mild to moderate  Duration: 1 1/2 weeks  Associated symptoms: fever and nasal congestion.    Patient Active Problem List   Diagnosis     Hypercholesteremia     Advanced directives, counseling/discussion     Family history of prostate cancer     Essential hypertension     Headache, unspecified headache type     Acute arterial ischemic stroke, multifocal, posterior circulation (H)     High prostate specific antigen (PSA)     Vision loss in left eye as late effect of cerebrovascular accident (CVA)       Current Outpatient Medications:      Ascorbic Acid (VITAMIN C) 500 MG CAPS, Take by mouth daily, Disp: , Rfl:      aspirin (ASA) 81 MG EC tablet, Take 1 tablet (81 mg) by mouth daily, Disp: 180 tablet, Rfl: 1     azithromycin (ZITHROMAX) 250 MG tablet, Take 2 tablets (500 mg) by mouth daily for 1 day, THEN 1 tablet (250 mg) daily for 4 days., Disp: 6 tablet, Rfl: 0     fenofibrate (TRICOR) 145 MG tablet, Take 1 tablet (145 mg) by mouth daily, Disp: 90 tablet, Rfl: 3     FEXOFENADINE HCL PO, Take 180 mg by mouth daily, Disp: , Rfl:      hydrocortisone (PROCTOSOL HC) 2.5 % cream, place rectally twice daily as directed., Disp: 28.35 g, Rfl: 0     ketoconazole (NIZORAL) 2 % shampoo, Apply topically as needed, Disp: , Rfl:      Lactobacillus (PROBIOTIC ACIDOPHILUS PO), Take 100 mg by mouth 2 times daily as needed, Disp: , Rfl:      lisinopril (ZESTRIL) 5 MG tablet, Take 1 tablet (5 mg) by mouth daily, Disp: 90 tablet, Rfl: 3     Naphazoline-Pheniramine (OPCON-A OP), Place 1-2 drops into both eyes daily as needed (allergies) , Disp: , Rfl:      pravastatin (PRAVACHOL) 10 MG tablet, Take 1 tablet (10 mg) by mouth daily, Disp: 90 tablet, Rfl: 3     triamcinolone (KENALOG) 0.1 % cream, Apply sparingly to affected area three times daily as needed, Disp: 80 g, Rfl: 0      triamcinolone (KENALOG) 0.1 % external cream, APPLY SPARINGLY TO AFFECTED AREA TWICE DAILY AS NEEDED, Disp: 80 g, Rfl: 1     Vitamin D-Vitamin K (K2 PLUS D3 PO), Take 1 capsule by mouth daily, Disp: , Rfl:      zinc gluconate 50 MG tablet, Take 50 mg by mouth daily, Disp: , Rfl:    History reviewed    ROS:  CONSTITUTIONAL:no fever, no chills or sweats, no excessive fatigue, no significant change in weight  EYES:  no vision changes, no redness or irritation, no discharge or mattering  ROS otherwise negative    OBJECTIVE  :/60  Wt 71.215 kg (157 lb)  APPEARANCE: = Nrl  Conj/Eyelids = Nrl  PERRLA/Irises = Nrl  Ear canals and TM's = TM congested/bulging right  Lips/Teeth/Gums = Nrl  Oropharynx = Nrl  Neck = Nrl  Resp effort = Nrl  Breath Sounds = Nrl  Heart Rate, Rythym, & sounds (no Murm)  = Nrl  SKIN absent significant rashes or lesions = Nrl  Recent/Remote Memory = Nrl  Mood/Affect = Nrl    ASSESSMENT:  1. Rash    - triamcinolone (KENALOG) 0.1 % external cream; APPLY SPARINGLY TO AFFECTED AREA TWICE DAILY AS NEEDED  Dispense: 80 g; Refill: 1    2. Non-recurrent acute suppurative otitis media of right ear without spontaneous rupture of tympanic membrane    - azithromycin (ZITHROMAX) 250 MG tablet; Take 2 tablets (500 mg) by mouth daily for 1 day, THEN 1 tablet (250 mg) daily for 4 days.  Dispense: 6 tablet; Refill: 0      Jarret Tay MD  [facility]  [phone]

## 2021-07-21 DIAGNOSIS — I63.40 CEREBROVASCULAR ACCIDENT (CVA) DUE TO EMBOLISM OF CEREBRAL ARTERY (H): ICD-10-CM

## 2021-07-21 DIAGNOSIS — E78.00 HYPERCHOLESTEREMIA: ICD-10-CM

## 2021-07-21 DIAGNOSIS — I10 ESSENTIAL HYPERTENSION: ICD-10-CM

## 2021-07-21 NOTE — TELEPHONE ENCOUNTER
Lisinopril, Prava, Feno. Last addressed 1/28/21. Please send to mail order instead of local.     Essential Hypertension   Remains well controlled when checked out of clinic.   he has not experienced any significant side effects from medications for hypertension.    NO active cardiac complaints or symptoms with exercise.  Current medications for treatment:  ACE inhibitors (Lisinopril, Ramipril,Captopril,Benazepril)     Reviewed last 6 BP readings in chart:      BP Readings from Last 6 Encounters:   01/28/21 120/72   01/27/20 136/80   01/24/19 132/80   10/11/18 130/88   05/07/18 112/68   01/22/18 110/74          Hypercholesteremia  Hyperlipidemia:  Has history of hyperlipidemia.    The patient is  taking a medication for this.  Denies any significant side effects from his medication.       Latest labs reviewed:          Recent Labs   Lab Test 01/21/21  0805 07/02/20  0845   CHOL 157 144   HDL 49 52   LDL 93 78   TRIG 81 68

## 2021-07-22 RX ORDER — LISINOPRIL 5 MG/1
5 TABLET ORAL DAILY
Qty: 90 TABLET | Refills: 1 | Status: SHIPPED | OUTPATIENT
Start: 2021-07-22 | End: 2022-01-31

## 2021-07-22 RX ORDER — PRAVASTATIN SODIUM 10 MG
10 TABLET ORAL DAILY
Qty: 90 TABLET | Refills: 1 | Status: SHIPPED | OUTPATIENT
Start: 2021-07-22 | End: 2022-01-31

## 2021-07-22 RX ORDER — FENOFIBRATE 145 MG/1
145 TABLET, COATED ORAL DAILY
Qty: 90 TABLET | Refills: 1 | Status: SHIPPED | OUTPATIENT
Start: 2021-07-22 | End: 2022-01-31

## 2021-07-30 ENCOUNTER — OFFICE VISIT (OUTPATIENT)
Dept: FAMILY MEDICINE | Facility: CLINIC | Age: 66
End: 2021-07-30

## 2021-07-30 VITALS
OXYGEN SATURATION: 97 % | RESPIRATION RATE: 16 BRPM | SYSTOLIC BLOOD PRESSURE: 126 MMHG | WEIGHT: 146.8 LBS | HEIGHT: 66 IN | TEMPERATURE: 98.6 F | DIASTOLIC BLOOD PRESSURE: 76 MMHG | BODY MASS INDEX: 23.59 KG/M2 | HEART RATE: 56 BPM

## 2021-07-30 DIAGNOSIS — H61.20 WAX IN EAR: ICD-10-CM

## 2021-07-30 DIAGNOSIS — I10 ESSENTIAL HYPERTENSION: Primary | ICD-10-CM

## 2021-07-30 PROCEDURE — 99212 OFFICE O/P EST SF 10 MIN: CPT | Performed by: FAMILY MEDICINE

## 2021-07-30 PROCEDURE — 93050 ART PRESSURE WAVEFORM ANALYS: CPT | Performed by: FAMILY MEDICINE

## 2021-07-30 RX ORDER — ACETAMINOPHEN 500 MG
500 TABLET ORAL DAILY PRN
COMMUNITY

## 2021-07-30 ASSESSMENT — MIFFLIN-ST. JEOR: SCORE: 1387.6

## 2021-07-30 NOTE — PROGRESS NOTES
Ear pain.  Wax removed from ear.  Hopefully that was what he was feeling.  Assessment/Plan:  Aiden was seen today for headache and ear problem.    Diagnoses and all orders for this visit:    Essential hypertension    Wax in ear      Jarret Tay MD   [facility]  [phone]

## 2021-07-30 NOTE — NURSING NOTE
2  HEARING FREQUENCY TESTED BOTH EARS:  Right Ear/Left Ear        500 Hz: Passed  1000 Hz: Passed   2000 Hz: Passed   4000 Hz: Passed    Jocelin Ag MA 7/30/2021

## 2021-08-03 ENCOUNTER — TELEPHONE (OUTPATIENT)
Dept: FAMILY MEDICINE | Facility: CLINIC | Age: 66
End: 2021-08-03

## 2021-08-03 DIAGNOSIS — H60.90 OTITIS EXTERNA: Primary | ICD-10-CM

## 2021-08-03 RX ORDER — NEOMYCIN SULFATE, POLYMYXIN B SULFATE AND HYDROCORTISONE 10; 3.5; 1 MG/ML; MG/ML; [USP'U]/ML
4 SUSPENSION/ DROPS AURICULAR (OTIC) 3 TIMES DAILY
Qty: 6 ML | Refills: 0 | Status: SHIPPED | OUTPATIENT
Start: 2021-08-03 | End: 2021-08-13

## 2021-08-03 NOTE — TELEPHONE ENCOUNTER
Patient calls reporting right ear was better 7/30 after Dr. Tay removed wax but has since been bothering again with itching and mild soreness.   Plan: Dr. Tay recommends Cortisporin Otic suspension gtts to ear and call if symptoms persist or worsen. Patient agrees. Rx sent to pharmacy.  Kyleigh Taylor RN

## 2021-08-05 ENCOUNTER — OFFICE VISIT (OUTPATIENT)
Dept: FAMILY MEDICINE | Facility: CLINIC | Age: 66
End: 2021-08-05

## 2021-08-05 VITALS
WEIGHT: 146 LBS | BODY MASS INDEX: 23.46 KG/M2 | SYSTOLIC BLOOD PRESSURE: 115 MMHG | OXYGEN SATURATION: 98 % | DIASTOLIC BLOOD PRESSURE: 72 MMHG | HEART RATE: 57 BPM | HEIGHT: 66 IN | RESPIRATION RATE: 18 BRPM

## 2021-08-05 DIAGNOSIS — I69.30 LATE EFFECT OF CEREBROVASCULAR ACCIDENT (CVA): ICD-10-CM

## 2021-08-05 DIAGNOSIS — R51.9 HEADACHE, UNSPECIFIED HEADACHE TYPE: ICD-10-CM

## 2021-08-05 DIAGNOSIS — I10 ESSENTIAL HYPERTENSION: Primary | ICD-10-CM

## 2021-08-05 DIAGNOSIS — Z00.00 ROUTINE GENERAL MEDICAL EXAMINATION AT A HEALTH CARE FACILITY: ICD-10-CM

## 2021-08-05 LAB — ERYTHROCYTE [SEDIMENTATION RATE] IN BLOOD: 5 MM/HR (ref 0–15)

## 2021-08-05 PROCEDURE — 93050 ART PRESSURE WAVEFORM ANALYS: CPT | Performed by: FAMILY MEDICINE

## 2021-08-05 PROCEDURE — 36415 COLL VENOUS BLD VENIPUNCTURE: CPT | Performed by: FAMILY MEDICINE

## 2021-08-05 PROCEDURE — 99214 OFFICE O/P EST MOD 30 MIN: CPT | Performed by: FAMILY MEDICINE

## 2021-08-05 PROCEDURE — 85651 RBC SED RATE NONAUTOMATED: CPT | Performed by: FAMILY MEDICINE

## 2021-08-05 ASSESSMENT — MIFFLIN-ST. JEOR: SCORE: 1383.97

## 2021-08-05 NOTE — LETTER
[facility] [address] [place]  [address]  Phone: [phone]    August 6, 2021      Aiden Valdez  [address]    [place] [address]              Dear Aiden,    I am writing to report that your included test results are within expected ranges. I do not suggest that we make any changes at this time.          Sincerely,     Jarret Tay M.D.    Results for orders placed or performed in visit on 08/05/21   Sed Rate [place] ([facility])     Status: None   Result Value Ref Range    Sed Rate 5 0 - 15 mm/hr

## 2021-08-05 NOTE — PROGRESS NOTES
Pain in the top of the head that radiates into the ear and behind the   Mostly awakens with the pain and at night.  Feels otherwise lots of HA.  No vision changes.  Problem(s) Oriented visit      ROS:  General and Resp. completed and negative except as noted above     HISTORY:   reports current alcohol use.   reports that he has never smoked. He has never used smokeless tobacco.    Past Medical History:   Diagnosis Date     Acute arterial ischemic stroke, multifocal, posterior circulation (H) 05/2017     AR (allergic rhinitis)      HTN (hypertension)      Hypercholesteremia      Periocular dermatitides      Past Surgical History:   Procedure Laterality Date     EXTRACAPSULAR CATARACT EXTRATION WITH INTRAOCULAR LENS IMPLANT       PROSTATE BIOPSY, NEEDLE, SATURATION SAMPLING         EXAM:  BP: 115/72   Pulse: 57    Temp: Data Unavailable    Wt Readings from Last 2 Encounters:   08/05/21 66.2 kg (146 lb)   07/30/21 66.6 kg (146 lb 12.8 oz)       BMI= Body mass index is 23.39 kg/m .    EXAM:  APPEARANCE: = Relaxed and in no distress  Conj/Eyelids = noninjected and lids and lashes are without inflammation  PERRLA/Irises = Pupils Round Reactive to Light and Irisis without inflammation  Ears/Nose = External structures and Nares have usual shape and form  Ear canals and TM's = Canals are not inflammed and have none or little wax and the drums are not injected and have a light reflex   Lips/Teeth/Gums = No lesions seen, good dentition, and gums seem healthy  Oropharynx = No leukoplakia, No injection to the tissues, Normal Uvula  Neck = No anterior or posterior adenopathy appreciated.  Resp effort = Calm regular breathing  Breath Sounds = Good air movement with no rales or rhonchi in any lung fields  Mood/Affect = Cooperative and interested      Assessment/Plan:  Aiden was seen today for ear problem and headache.    Diagnoses and all orders for this visit:    Essential hypertension  -     [place] ART PRESS WAVEFORM ANALYS CENTRAL  "ART PRESSURE    Routine general medical examination at a health care facility    Vision loss in left eye as late effect of cerebrovascular accident (CVA)  -     Referral to [facility]; Future  -     Sed Rate Abdifatah ([facility])    Headache, unspecified headache type  -     Referral to [facility]; Future  -     Sed Rate Westergren ([facility])        COUNSELING:   reports that he has never smoked. He has never used smokeless tobacco.    Estimated body mass index is 23.39 kg/m  as calculated from the following:    Height as of this encounter: 1.683 m (5' 6.25\").    Weight as of this encounter: 66.2 kg (146 lb).       Appropriate preventive services were discussed with this patient, including applicable screening as appropriate for cardiovascular disease, diabetes, osteopenia/osteoporosis, and glaucoma.  As appropriate for age/gender, discussed screening for colorectal cancer, prostate cancer, breast cancer, and cervical cancer. Checklist reviewing preventive services available has been given to the patient.    Reviewed patients plan of care and provided an AVS. The Basic Care Plan (routine screening as documented in Health Maintenance) for Aiden meets the Care Plan requirement. This Care Plan has been established and reviewed with the  Patient.      The following health maintenance items are reviewed in Epic and correct as of today:  Health Maintenance   Topic Date Due     INFLUENZA VACCINE (1) 09/01/2021     MEDICARE ANNUAL WELLNESS VISIT  01/28/2022     FALL RISK ASSESSMENT  01/28/2022     DTAP/TDAP/TD IMMUNIZATION (3 - Td or Tdap) 12/31/2022     COLORECTAL CANCER SCREENING  04/09/2023     LIPID  01/21/2026     ADVANCE CARE PLANNING  03/03/2026     HEPATITIS C SCREENING  Completed     PHQ-2  Completed     Pneumococcal Vaccine: 65+ Years  Completed     ZOSTER IMMUNIZATION  Completed     AORTIC ANEURYSM SCREENING (SYSTEM ASSIGNED)  Completed     COVID-19 Vaccine  Completed     IPV IMMUNIZATION  Aged Out     " MENINGITIS IMMUNIZATION  Aged Out     HEPATITIS B IMMUNIZATION  Aged Out       Jarret Tay  [facility]  For any issues my office # is [phone]

## 2021-08-17 ENCOUNTER — TRANSFERRED RECORDS (OUTPATIENT)
Dept: FAMILY MEDICINE | Facility: CLINIC | Age: 66
End: 2021-08-17

## 2021-08-17 NOTE — LETTER
[facility] [address] [place]  [address]  Phone: [phone]    August 17, 2021      Aiden Valdez                                                                                                                                 [address]    [place] [address]            Dear Aiden,     I am writing to report that your included test results are within expected ranges. I do not suggest that we make any changes at this time.     Jarret Tay M.D./ibeth

## 2021-08-19 ENCOUNTER — TRANSFERRED RECORDS (OUTPATIENT)
Dept: FAMILY MEDICINE | Facility: CLINIC | Age: 66
End: 2021-08-19

## 2021-08-23 ENCOUNTER — TRANSFERRED RECORDS (OUTPATIENT)
Dept: FAMILY MEDICINE | Facility: CLINIC | Age: 66
End: 2021-08-23

## 2021-12-29 DIAGNOSIS — R21 RASH: ICD-10-CM

## 2021-12-30 RX ORDER — TRIAMCINOLONE ACETONIDE 1 MG/G
CREAM TOPICAL
Qty: 80 G | Refills: 1 | Status: SHIPPED | OUTPATIENT
Start: 2021-12-30 | End: 2022-01-04

## 2022-01-03 DIAGNOSIS — R21 RASH: ICD-10-CM

## 2022-01-04 RX ORDER — TRIAMCINOLONE ACETONIDE 1 MG/G
CREAM TOPICAL
Qty: 80 G | Refills: 1 | Status: SHIPPED | OUTPATIENT
Start: 2022-01-04

## 2022-01-24 DIAGNOSIS — R97.20 HIGH PROSTATE SPECIFIC ANTIGEN (PSA): ICD-10-CM

## 2022-01-24 DIAGNOSIS — E78.00 HYPERCHOLESTEREMIA: Primary | ICD-10-CM

## 2022-01-24 DIAGNOSIS — I10 ESSENTIAL HYPERTENSION: ICD-10-CM

## 2022-01-24 LAB
% GRANULOCYTES: 59.1 % (ref 42.2–75.2)
HCT VFR BLD AUTO: 45.2 % (ref 39–51)
HEMOGLOBIN: 14.8 G/DL (ref 13.4–17.5)
LYMPHOCYTES NFR BLD AUTO: 33.1 % (ref 20.5–51.1)
MCH RBC QN AUTO: 29.6 PG (ref 27–31)
MCHC RBC AUTO-ENTMCNC: 32.7 G/DL (ref 33–37)
MCV RBC AUTO: 90.4 FL (ref 80–100)
MONOCYTES NFR BLD AUTO: 7.8 % (ref 1.7–9.3)
PLATELET # BLD AUTO: 325 K/UL (ref 140–450)
RBC # BLD AUTO: 4.99 X10/CMM (ref 4.2–5.9)
WBC # BLD AUTO: 4.6 X10/CMM (ref 3.8–11)

## 2022-01-24 PROCEDURE — 36415 COLL VENOUS BLD VENIPUNCTURE: CPT | Performed by: FAMILY MEDICINE

## 2022-01-24 PROCEDURE — 85025 COMPLETE CBC W/AUTO DIFF WBC: CPT | Performed by: FAMILY MEDICINE

## 2022-01-24 NOTE — LETTER
[facility] [address] [place]  [address]  Phone: [phone]    January 26, 2022      Aiden Valdez  [address]    [place] [address]              Dear Aiden,       Here is a copy of your labs, we will discuss them at your upcoming visit.       Jarret Tay MD       Results for orders placed or performed in visit on 01/24/22   Comp. Metabolic Panel (14) ([facility])     Status: None   Result Value Ref Range    Glucose 96 65 - 99 mg/dL    Urea Nitrogen 18 8 - 27 mg/dL    Creatinine 0.85 0.76 - 1.27 mg/dL    eGFR If NonAfricn Am 90 >59 mL/min/1.73    eGFR If Africn Am 103 >59 mL/min/1.73    BUN/Creatinine Ratio 21 10 - 24    Sodium 139 134 - 144 mmol/L    Potassium 4.4 3.5 - 5.2 mmol/L    Chloride 101 96 - 106 mmol/L    Total CO2 26 20 - 29 mmol/L    Calcium 10.1 8.6 - 10.2 mg/dL    Protein Total 7.5 6.0 - 8.5 g/dL    Albumin 4.6 3.8 - 4.8 g/dL    Globulin, Total 2.9 1.5 - 4.5 g/dL    A/G Ratio 1.6 1.2 - 2.2    Bilirubin Total 0.3 0.0 - 1.2 mg/dL    Alkaline Phosphatase 47 44 - 121 IU/L    AST 17 0 - 40 IU/L    ALT 8 0 - 44 IU/L    Narrative    Performed at:  01 - [facility] [address], [place], [place]  [id number]  : Akhil Baxter MD, Phone:  [phone]   CBC with Diff/Plt ([facility])     Status: Abnormal   Result Value Ref Range    WBC x10/cmm 4.6 3.8 - 11.0 x10/cmm    % Lymphocytes 33.1 20.5 - 51.1 %    % Monocytes 7.8 1.7 - 9.3 %    % Granulocytes 59.1 42.2 - 75.2 %    RBC x10/cmm 4.99 4.2 - 5.9 x10/cmm    Hemoglobin 14.8 13.4 - 17.5 g/dl    Hematocrit 45.2 39 - 51 %    MCV 90.4 80 - 100 fL    MCH 29.6 27.0 - 31.0 pg    MCHC 32.7 (A) 33.0 - 37.0 g/dL    Platelet Count 325 140 - 450 K/uL   Lipid Panel ([facility])     Status: Abnormal   Result Value Ref Range    Cholesterol 176 100 - 199 mg/dL    Triglycerides 90 0 - 149 mg/dL    HDL Cholesterol 47 >39 mg/dL    VLDL Cholesterol George 17 5 - 40 mg/dL    LDL Cholesterol Calculated 112 (H) 0 - 99 mg/dL     LDL/HDL Ratio 2.4 0.0 - 3.6 ratio    Narrative    Performed at:  01 - [facility] [address], [place], [place]  [id number]  : Akhil Baxter MD, Phone:  [phone]   PSA Serum ([facility])     Status: Abnormal   Result Value Ref Range    PSA NG/ML 5.7 (H) 0.0 - 4.0 ng/mL    Narrative    Performed at:  01 - [facility] [address], [place], [place]  [id number]  : Akhil Baxter MD, Phone:  [phone]

## 2022-01-25 LAB
ALBUMIN SERPL-MCNC: 4.6 G/DL (ref 3.8–4.8)
ALBUMIN/GLOB SERPL: 1.6 {RATIO} (ref 1.2–2.2)
ALP SERPL-CCNC: 47 IU/L (ref 44–121)
ALT SERPL-CCNC: 8 IU/L (ref 0–44)
AST SERPL-CCNC: 17 IU/L (ref 0–40)
BILIRUB SERPL-MCNC: 0.3 MG/DL (ref 0–1.2)
BUN SERPL-MCNC: 18 MG/DL (ref 8–27)
BUN/CREATININE RATIO: 21 (ref 10–24)
CALCIUM SERPL-MCNC: 10.1 MG/DL (ref 8.6–10.2)
CHLORIDE SERPLBLD-SCNC: 101 MMOL/L (ref 96–106)
CHOLEST SERPL-MCNC: 176 MG/DL (ref 100–199)
CREAT SERPL-MCNC: 0.85 MG/DL (ref 0.76–1.27)
EGFR IF AFRICN AM: 103 ML/MIN/1.73
EGFR IF NONAFRICN AM: 90 ML/MIN/1.73
GLOBULIN, TOTAL: 2.9 G/DL (ref 1.5–4.5)
GLUCOSE SERPL-MCNC: 96 MG/DL (ref 65–99)
HDLC SERPL-MCNC: 47 MG/DL
LDL/HDL RATIO: 2.4 RATIO (ref 0–3.6)
LDLC SERPL CALC-MCNC: 112 MG/DL (ref 0–99)
POTASSIUM SERPL-SCNC: 4.4 MMOL/L (ref 3.5–5.2)
PROT SERPL-MCNC: 7.5 G/DL (ref 6–8.5)
PSA NG/ML: 5.7 NG/ML (ref 0–4)
SODIUM SERPL-SCNC: 139 MMOL/L (ref 134–144)
TOTAL CO2: 26 MMOL/L (ref 20–29)
TRIGL SERPL-MCNC: 90 MG/DL (ref 0–149)
VLDLC SERPL CALC-MCNC: 17 MG/DL (ref 5–40)

## 2022-01-25 NOTE — RESULT ENCOUNTER NOTE
Dear Aiden,  Here is a copy of your labs, we will discuss them at your upcoming visit.  Jarret Tay MD

## 2022-01-31 ENCOUNTER — OFFICE VISIT (OUTPATIENT)
Dept: FAMILY MEDICINE | Facility: CLINIC | Age: 67
End: 2022-01-31

## 2022-01-31 VITALS
SYSTOLIC BLOOD PRESSURE: 116 MMHG | OXYGEN SATURATION: 99 % | BODY MASS INDEX: 23.39 KG/M2 | DIASTOLIC BLOOD PRESSURE: 78 MMHG | HEART RATE: 58 BPM | WEIGHT: 146 LBS

## 2022-01-31 DIAGNOSIS — Z00.00 ENCOUNTER FOR MEDICARE ANNUAL WELLNESS EXAM: Primary | ICD-10-CM

## 2022-01-31 DIAGNOSIS — I69.30 LATE EFFECT OF CEREBROVASCULAR ACCIDENT (CVA): ICD-10-CM

## 2022-01-31 DIAGNOSIS — R97.20 HIGH PROSTATE SPECIFIC ANTIGEN (PSA): ICD-10-CM

## 2022-01-31 DIAGNOSIS — I63.40 CEREBROVASCULAR ACCIDENT (CVA) DUE TO EMBOLISM OF CEREBRAL ARTERY (H): ICD-10-CM

## 2022-01-31 DIAGNOSIS — E78.00 HYPERCHOLESTEREMIA: ICD-10-CM

## 2022-01-31 DIAGNOSIS — I10 ESSENTIAL HYPERTENSION: ICD-10-CM

## 2022-01-31 PROCEDURE — G0439 PPPS, SUBSEQ VISIT: HCPCS | Performed by: FAMILY MEDICINE

## 2022-01-31 PROCEDURE — 99214 OFFICE O/P EST MOD 30 MIN: CPT | Mod: 25 | Performed by: FAMILY MEDICINE

## 2022-01-31 RX ORDER — PRAVASTATIN SODIUM 10 MG
10 TABLET ORAL DAILY
Qty: 90 TABLET | Refills: 3 | Status: SHIPPED | OUTPATIENT
Start: 2022-01-31 | End: 2023-04-07

## 2022-01-31 RX ORDER — FENOFIBRATE 145 MG/1
145 TABLET, COATED ORAL DAILY
Qty: 90 TABLET | Refills: 3 | Status: SHIPPED | OUTPATIENT
Start: 2022-01-31 | End: 2023-04-07

## 2022-01-31 RX ORDER — LISINOPRIL 5 MG/1
5 TABLET ORAL DAILY
Qty: 90 TABLET | Refills: 3 | Status: SHIPPED | OUTPATIENT
Start: 2022-01-31 | End: 2022-11-28

## 2022-01-31 NOTE — PROGRESS NOTES
"  SUBJECTIVE:   Aiden Valdez is a 66 year old male who presents for Preventive Visit.    Patient has been advised of split billing requirements and indicates understanding: Yes  Are you in the first 12 months of your Medicare Part B coverage?  No    Physical Health:    In general, how would you rate your overall physical health? good    Outside of work, how many days during the week do you exercise? 4-5 days/week    Outside of work, approximately how many minutes a day do you exercise?45-60 minutes    If you drink alcohol do you typically have >3 drinks per day or >7 drinks per week? No    Do you usually eat at least 4 servings of fruit and vegetables a day, include whole grains & fiber and avoid regularly eating high fat or \"junk\" foods? Yes    Do you have any problems taking medications regularly?  No    Do you have any side effects from medications? none    Needs assistance for the following daily activities: no assistance needed    Which of the following safety concerns are present in your home?  none identified     Hearing impairment: No    In the past 6 months, have you been bothered by leaking of urine? no    Hearing Screening:  Right Ear  4000Hz: pass  2000Hz: pass  1000Hz: pass  500Hz: pass    Left Ear  4000Hz: pass  2000Hz: pass  1000Hz: pass  500Hz: pass     Mental Health:    In general, how would you rate your overall mental or emotional health? good  PHQ-2 Score:   PHQ-2 ( 1999 [employer]) 1/31/2022 1/28/2021   Q1: Little interest or pleasure in doing things 0 0   Q2: Feeling down, depressed or hopeless 0 0   PHQ-2 Score 0 0   PHQ-2 Total Score (12-17 Years)- Positive if 3 or more points; Administer PHQ-A if positive - 0     Do you feel safe in your environment? YES    Have you ever done Advance Care Planning? (For example, a Health Directive, POLST, or a discussion with a medical provider or your loved ones about your wishes): Yes, advance care planning is on file.    Additional concerns to address?  " YES    Fall risk:  Fallen 2 or more times in the past year?: No  Any fall with injury in the past year?: No    Cognitive Screenin) Repeat 3 items (Leader, Season, Table)    2) Clock draw: NORMAL  3) 3 item recall: Recalls 3 objects  Results: 3 items recalled: COGNITIVE IMPAIRMENT LESS LIKELY  Mini-CogTM Copyright ADY Partida. Licensed by the author for use in [facility]; reprinted with permission (brittani@[facility]). All rights reserved.      Do you have sleep apnea, excessive snoring or daytime drowsiness?: no        PROBLEMS TO ADD ON...  Essential Hypertension   Remains well controlled when checked out of clinic.   he has not experienced any significant side effects from medications for hypertension.    NO active cardiac complaints or symptoms with exercise.  Current medications for treatment:  Lisinopril    Reviewed last 6 BP readings in chart:  BP Readings from Last 6 Encounters:   22 116/78   21 115/72   21 126/76   21 124/88   21 120/72   20 136/80       Hyperlipidemia:  Has history of hyperlipidemia.    The patient is taking a medication for this.  Denies any significant side effects from his medication.      Latest labs reviewed:    Recent Labs   Lab Test 22  0907 21  0805   CHOL 176 157   HDL 47 49   * 93   TRIG 90 81        Lab Results   Component Value Date    AST 17 2022      Vascular     Reviewed and updated as needed this visit by clinical staff  Tobacco  Allergies  Meds             Reviewed and updated as needed this visit by Provider               Social History     Tobacco Use     Smoking status: Never Smoker     Smokeless tobacco: Never Used   Substance Use Topics     Alcohol use: Yes     Comment: 1-3 week                           Current providers sharing in care for this patient include:   Patient Care Team:  Jarret Tay MD as PCP - General (Family Practice)  Jarret Tay MD as Assigned PCP    The  "following health maintenance items are reviewed in Epic and correct as of today:  Health Maintenance   Topic Date Due     FALL RISK ASSESSMENT  01/28/2022     DTAP/TDAP/TD IMMUNIZATION (3 - Td or Tdap) 12/31/2022     MEDICARE ANNUAL WELLNESS VISIT  01/31/2023     COLORECTAL CANCER SCREENING  04/09/2023     LIPID  01/24/2027     ADVANCE CARE PLANNING  01/31/2027     HEPATITIS C SCREENING  Completed     PHQ-2  Completed     INFLUENZA VACCINE  Completed     Pneumococcal Vaccine: 65+ Years  Completed     ZOSTER IMMUNIZATION  Completed     AORTIC ANEURYSM SCREENING (SYSTEM ASSIGNED)  Completed     COVID-19 Vaccine  Completed     IPV IMMUNIZATION  Aged Out     MENINGITIS IMMUNIZATION  Aged Out     HEPATITIS B IMMUNIZATION  Aged Out     Labs reviewed in EPIC      ROS:  Constitutional, HEENT, cardiovascular, pulmonary, GI, , musculoskeletal, neuro, skin, endocrine and psych systems are negative, except as otherwise noted.    OBJECTIVE:   /78   Pulse 58   Wt 66.2 kg (146 lb)   SpO2 99%   BMI 23.39 kg/m   Estimated body mass index is 23.39 kg/m  as calculated from the following:    Height as of 8/5/21: 1.683 m (5' 6.25\").    Weight as of this encounter: 66.2 kg (146 lb).  EXAM:   GENERAL: healthy, alert and no distress  EYES: Eyes grossly normal to inspection, PERRL and conjunctivae and sclerae normal  HENT: ear canals and TM's normal, nose and mouth without ulcers or lesions  NECK: no adenopathy, no asymmetry, masses, or scars and thyroid normal to palpation  RESP: lungs clear to auscultation - no rales, rhonchi or wheezes  CV: regular rate and rhythm, normal S1 S2, no S3 or S4, no murmur, click or rub, no peripheral edema and peripheral pulses strong  ABDOMEN: soft, nontender, no hepatosplenomegaly, no masses and bowel sounds normal  MS: no gross musculoskeletal defects noted, no edema  SKIN: no suspicious lesions or rashes  NEURO: Normal strength and tone, mentation intact and speech normal  PSYCH: mentation " appears normal, affect normal/bright    Diagnostic Test Results:  Labs reviewed in Epic    ASSESSMENT / PLAN:   Aiden was seen today for physical, hearing screening, nutrition counseling and hr.    Diagnoses and all orders for this visit:    Encounter for Medicare annual wellness exam    Essential hypertension  Reviewed his current HTN management. Discussed our goal for him is a systolic pressure at or below 128 and diastolic pressure at or below 83.  We today managed his prescriptions with refills ensured to ensure availabilty of current medications.  Discussed the importance for aggressive management of HTN to prevent vascular complications later.  Recommended lower fat, lower carbohydrate, and lower sodium (<2000 mg)diet. Required intervals for follow up on HTN, lab studies reviewed.    Strongly recommened he follow his blood pressures outside the clinic to ensure that BPs are remaining within guidelines,.  Instructed to contact me if the readings are not within guidelines on a regular basis so we can adjust treatment as needed.    -     lisinopril (ZESTRIL) 5 MG tablet; Take 1 tablet (5 mg) by mouth daily    Late effects of cerebrovascular accident (CVA)  Managing risk with current meds.    High prostate specific antigen (PSA)  Recheck in 4 months, has gone up and down in the past.    Hypercholesteremia  Hyperlipidemia  Discussed current lipid results, previous results (if available) current guidelines (NCEP) for treatment and goals for lipids.    Discussed ongoing lifestyle modification, dietary changes (low fat, low simple carb) and regular aerobic exercise.    Discussed the link between dysmetabolic syndrome and impaired glucose tolerance seen in certain patterns of lipids.     Reviewed medication use for lipid lowering, including the statins are their possible side effects of myalgias, rhabdomyolysis, and liver toxicity.  We today managed his prescriptions with refills ensured to ensure availabilty of current  "medications.  Discussed the importance for aggressive management of dyslipidemia to prevent vascular complications later.     Instructed to contact me if he develop any intolerance to the treatment.    -     fenofibrate (TRICOR) 145 MG tablet; Take 1 tablet (145 mg) by mouth daily    Cerebrovascular accident (CVA) due to embolism of cerebral artery (H)  -     pravastatin (PRAVACHOL) 10 MG tablet; Take 1 tablet (10 mg) by mouth daily    COUNSELING:  Reviewed preventive health counseling, as reflected in patient instructions       Regular exercise       Healthy diet/nutrition    Estimated body mass index is 23.39 kg/m  as calculated from the following:    Height as of 8/5/21: 1.683 m (5' 6.25\").    Weight as of this encounter: 66.2 kg (146 lb).        He reports that he has never smoked. He has never used smokeless tobacco.    Appropriate preventive services were discussed with this patient, including applicable screening as appropriate for cardiovascular disease, diabetes, osteopenia/osteoporosis, and glaucoma.  As appropriate for age/gender, discussed screening for colorectal cancer, prostate cancer, breast cancer, and cervical cancer. Checklist reviewing preventive services available has been given to the patient.    Reviewed patients plan of care and provided an AVS. The Basic Care Plan (routine screening as documented in Health Maintenance) for Aiden meets the Care Plan requirement. This Care Plan has been established and reviewed with the Patient.    Counseling Resources:  ATP IV Guidelines  Pooled Cohorts Equation Calculator  Breast Cancer Risk Calculator  BRCA-Related Cancer Risk Assessment: FHS-7 Tool  FRAX Risk Assessment  ICSI Preventive Guidelines  Dietary Guidelines for Americans, 2010  USDA's MyPlate  ASA Prophylaxis  Lung CA Screening    Jarret Tay MD  [facility]  "

## 2022-01-31 NOTE — PATIENT INSTRUCTIONS
Personalized Prevention Plan  You are due for the preventive services outlined below.  Your care team is available to assist you in scheduling these services.  If you have already completed any of these items, please share that information with your care team to update in your medical record.  Health Maintenance Due   Topic Date Due     PHQ-2  01/01/2022     FALL RISK ASSESSMENT  01/28/2022         Thank you for coming in today!   If you receive a survey via My Chart, mail or phone, please let us know if there was anything you especially appreciated today or if there is any way we can improve our clinic. We value your input.     Likewise, we are working hard to attend to our digital reputation.    Please consider reviewing our clinic on Google and/or [email or web address] via the following links:    [email or web address]                                          We truly appreciate you taking the time to do this!    General Information:    Today you had your appointment with Jarret Tay MD    I am in the clinic:  Tuesdays and Thursdays  And Monday and Friday mornings    I am not in the office Wednesdays, non urgent calls received on Wednesday will be addressed when I am back in the office on Thursday.    If lab work was done today as part of your evaluation you will generally be contacted via My Chart, mail, or phone with the results within 1-5 days. If there is an alarming result we will contact you by phone. Lab results come back at varying times, I generally wait until all labs are resulted before making comments on results. Please note labs are automatically released to My Chart once available.    If you need refills please contact your pharmacist. They will send a refill request to me to review. Please allow 3 business days for us to process all refill requests.    Please call or send a medical message with any questions or  "concerns.    Thank you for choosing [facility].  We truly appreciate you trusting us with your medical care.    Your next visit with us should be scheduled for Follow up in 4 months     Listed next are the medical health Maintenance items we are tracking for your care and when they are next due (or overdue!)  Our goal is 100% \"up to date.\" Keep this list handy to call and schedule what you are due for in the future!    Health Maintenance Due   Topic Date Due     FALL RISK ASSESSMENT  01/28/2022       Sincerely,    Jarret Tay MD    "

## 2022-02-01 DIAGNOSIS — R97.20 ELEVATED PROSTATE SPECIFIC ANTIGEN (PSA): Primary | ICD-10-CM

## 2022-02-08 ENCOUNTER — OFFICE VISIT (OUTPATIENT)
Dept: FAMILY MEDICINE | Facility: CLINIC | Age: 67
End: 2022-02-08

## 2022-02-08 VITALS
OXYGEN SATURATION: 99 % | BODY MASS INDEX: 22.75 KG/M2 | HEART RATE: 62 BPM | WEIGHT: 142 LBS | DIASTOLIC BLOOD PRESSURE: 86 MMHG | SYSTOLIC BLOOD PRESSURE: 130 MMHG

## 2022-02-08 DIAGNOSIS — I10 ESSENTIAL HYPERTENSION: Primary | ICD-10-CM

## 2022-02-08 DIAGNOSIS — M47.812 CERVICAL ARTHRITIS: ICD-10-CM

## 2022-02-08 PROCEDURE — 99214 OFFICE O/P EST MOD 30 MIN: CPT | Performed by: FAMILY MEDICINE

## 2022-02-08 NOTE — PROGRESS NOTES
He complains of left neck pain for 5 days, positional with bending or lifting, with radiation down theshou. Precipitating factors: none recalled by the patient. 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 There is no numbness or weakness in the arms.  Problem(s) Oriented visit      ROS:  General and Resp. completed and negative except as noted above     HISTORY:   reports current alcohol use.   reports that he has never smoked. He has never used smokeless tobacco.    Past Medical History:   Diagnosis Date     Acute arterial ischemic stroke, multifocal, posterior circulation (H) 05/2017     AR (allergic rhinitis)      HTN (hypertension)      Hypercholesteremia      Periocular dermatitides      Past Surgical History:   Procedure Laterality Date     EXTRACAPSULAR CATARACT EXTRATION WITH INTRAOCULAR LENS IMPLANT       PROSTATE BIOPSY, NEEDLE, SATURATION SAMPLING         EXAM:  BP: 130/86   Pulse: 62    Temp: Data Unavailable    Wt Readings from Last 2 Encounters:   02/08/22 64.4 kg (142 lb)   01/31/22 66.2 kg (146 lb)       BMI= Body mass index is 22.75 kg/m .    EXAM:  APPEARANCE: = Relaxed and in no distress  PERRLA/Irises = Pupils Round Reactive to Light and Irisis without inflammation  Ears/Nose = External structures and Nares have usual shape and form  Ear canals and TM's = Canals are not inflammed and have none or little wax and the drums are not injected and have a light reflex   Lips/Teeth/Gums = No lesions seen, good dentition, and gums seem healthy  Oropharynx = No leukoplakia, No injection to the tissues, Normal Uvula  Neck = No anterior or posterior adenopathy appreciated.  Resp effort = Calm regular breathing  Breath Sounds = Good air movement with no rales or rhonchi in any lung fields  Musculsktl: decreased range of motion in the spine with good neuro finding.  NEURO = CN II-XII are tested and no deficits found  Mood/Affect = Cooperative and  "interested      Assessment/Plan:  Aiden was seen today for neck problem.    Diagnoses and all orders for this visit:    Essential hypertension    Cervical arthritis        COUNSELING:   reports that he has never smoked. He has never used smokeless tobacco.    Estimated body mass index is 22.75 kg/m  as calculated from the following:    Height as of 8/5/21: 1.683 m (5' 6.25\").    Weight as of this encounter: 64.4 kg (142 lb).       Appropriate preventive services were discussed with this patient, including applicable screening as appropriate for cardiovascular disease, diabetes, osteopenia/osteoporosis, and glaucoma.  As appropriate for age/gender, discussed screening for colorectal cancer, prostate cancer, breast cancer, and cervical cancer. Checklist reviewing preventive services available has been given to the patient.    Reviewed patients plan of care and provided an AVS. The Basic Care Plan (routine screening as documented in Health Maintenance) for Aiden meets the Care Plan requirement. This Care Plan has been established and reviewed with the  Patient.    >30 min spent with patient, greater than 50% spent on discussion/education/planning, etc. About The primary encounter diagnosis was Essential hypertension. A diagnosis of Cervical arthritis was also pertinent to this visit.  Has high anxiety about his health    The following health maintenance items are reviewed in Epic and correct as of today:  Health Maintenance   Topic Date Due     DTAP/TDAP/TD IMMUNIZATION (3 - Td or Tdap) 12/31/2022     MEDICARE ANNUAL WELLNESS VISIT  01/31/2023     FALL RISK ASSESSMENT  01/31/2023     COLORECTAL CANCER SCREENING  04/09/2023     LIPID  01/24/2027     ADVANCE CARE PLANNING  01/31/2027     HEPATITIS C SCREENING  Completed     PHQ-2  Completed     INFLUENZA VACCINE  Completed     Pneumococcal Vaccine: 65+ Years  Completed     ZOSTER IMMUNIZATION  Completed     AORTIC ANEURYSM SCREENING (SYSTEM ASSIGNED)  Completed     " COVID-19 Vaccine  Completed     IPV IMMUNIZATION  Aged Out     MENINGITIS IMMUNIZATION  Aged Out     HEPATITIS B IMMUNIZATION  Aged Out       Jarret Tay  [facility]  For any issues my office # is [phone]

## 2022-05-20 DIAGNOSIS — R97.20 ELEVATED PROSTATE SPECIFIC ANTIGEN (PSA): ICD-10-CM

## 2022-05-20 PROCEDURE — 36415 COLL VENOUS BLD VENIPUNCTURE: CPT | Performed by: FAMILY MEDICINE

## 2022-05-21 LAB — PSA NG/ML: 5.2 NG/ML (ref 0–4)

## 2022-05-25 NOTE — RESULT ENCOUNTER NOTE
Dear Aiden,     I am writing to report that your included test results are as expected.  Most labs contain some results that are slightly outside of the normal range, I have reviewed each of these results and they require no changes at this time.    Jarret Tay MD

## 2022-05-27 ENCOUNTER — OFFICE VISIT (OUTPATIENT)
Dept: FAMILY MEDICINE | Facility: CLINIC | Age: 67
End: 2022-05-27

## 2022-05-27 VITALS
HEIGHT: 66 IN | SYSTOLIC BLOOD PRESSURE: 136 MMHG | DIASTOLIC BLOOD PRESSURE: 80 MMHG | BODY MASS INDEX: 24.33 KG/M2 | RESPIRATION RATE: 18 BRPM | WEIGHT: 151.4 LBS | OXYGEN SATURATION: 98 % | HEART RATE: 92 BPM

## 2022-05-27 DIAGNOSIS — R97.20 HIGH PROSTATE SPECIFIC ANTIGEN (PSA): Primary | ICD-10-CM

## 2022-05-27 DIAGNOSIS — I10 ESSENTIAL HYPERTENSION: ICD-10-CM

## 2022-05-27 PROCEDURE — 99213 OFFICE O/P EST LOW 20 MIN: CPT | Performed by: FAMILY MEDICINE

## 2022-05-27 NOTE — PROGRESS NOTES
Assessment & Plan     High prostate specific antigen (PSA)  Down from     Essential hypertension  Reviewed his current HTN management. Discussed our goal for him is a systolic pressure at or below 128 and diastolic pressure at or below 83.  We today managed his prescriptions with refills ensured to ensure availabilty of current medications.  Discussed the importance for aggressive management of HTN to prevent vascular complications later.  Recommended lower fat, lower carbohydrate, and lower sodium (<2000 mg)diet. Required intervals for follow up on HTN, lab studies reviewed.    Strongly recommened he follow his blood pressures outside the clinic to ensure that BPs are remaining within guidelines,.  Instructed to contact me if the readings are not within guidelines on a regular basis so we can adjust treatment as needed.                 Regular exercise    No follow-ups on file.    Jarret Tay MD  [facility]    Rafal Wolfe is a 66 year old who presents for the following health issues     HPI     Reviewed his current HTN management. Discussed our goal for him is a systolic pressure at or below 128 and diastolic pressure at or below 83.  We today managed his prescriptions with refills ensured to ensure availabilty of current medications.  Discussed the importance for aggressive management of HTN to prevent vascular complications later.  Recommended lower fat, lower carbohydrate, and lower sodium (<2000 mg)diet. Required intervals for follow up on HTN, lab studies reviewed.    Strongly recommened he follow his blood pressures outside the clinic to ensure that BPs are remaining within guidelines,.  Instructed to contact me if the readings are not within guidelines on a regular basis so we can adjust treatment as needed.        Review of Systems   Constitutional, HEENT, cardiovascular, pulmonary, gi and gu systems are negative, except as otherwise noted.      Objective    /80   Pulse  "92   Resp 18   Ht 1.67 m (5' 5.75\")   Wt 68.7 kg (151 lb 6.4 oz)   SpO2 98%   BMI 24.62 kg/m    Body mass index is 24.62 kg/m .  Physical Exam   GENERAL: healthy, alert and no distress  NECK: no adenopathy, no asymmetry, masses, or scars and thyroid normal to palpation  RESP: lungs clear to auscultation - no rales, rhonchi or wheezes  CV: regular rate and rhythm, normal S1 S2, no S3 or S4, no murmur, click or rub, no peripheral edema and peripheral pulses strong  ABDOMEN: soft, nontender, no hepatosplenomegaly, no masses and bowel sounds normal  MS: no gross musculoskeletal defects noted, no edema             "

## 2022-09-21 ENCOUNTER — TRANSFERRED RECORDS (OUTPATIENT)
Dept: FAMILY MEDICINE | Facility: CLINIC | Age: 67
End: 2022-09-21

## 2022-09-21 LAB — RETINOPATHY: NORMAL

## 2022-11-21 DIAGNOSIS — R97.20 HIGH PROSTATE SPECIFIC ANTIGEN (PSA): Primary | ICD-10-CM

## 2022-11-21 PROCEDURE — 36415 COLL VENOUS BLD VENIPUNCTURE: CPT | Performed by: FAMILY MEDICINE

## 2022-11-22 LAB — PSA NG/ML: 12.1 NG/ML (ref 0–4)

## 2022-11-28 ENCOUNTER — OFFICE VISIT (OUTPATIENT)
Dept: FAMILY MEDICINE | Facility: CLINIC | Age: 67
End: 2022-11-28

## 2022-11-28 ENCOUNTER — TELEPHONE (OUTPATIENT)
Dept: OTHER | Facility: CLINIC | Age: 67
End: 2022-11-28

## 2022-11-28 VITALS
HEIGHT: 66 IN | HEART RATE: 83 BPM | BODY MASS INDEX: 25.01 KG/M2 | WEIGHT: 155.6 LBS | DIASTOLIC BLOOD PRESSURE: 96 MMHG | RESPIRATION RATE: 16 BRPM | SYSTOLIC BLOOD PRESSURE: 156 MMHG | OXYGEN SATURATION: 99 %

## 2022-11-28 DIAGNOSIS — I73.9 PERIPHERAL VASCULAR DISEASE (H): ICD-10-CM

## 2022-11-28 DIAGNOSIS — I10 ESSENTIAL HYPERTENSION: ICD-10-CM

## 2022-11-28 DIAGNOSIS — C61 PROSTATE CANCER (H): ICD-10-CM

## 2022-11-28 DIAGNOSIS — R97.20 HIGH PROSTATE SPECIFIC ANTIGEN (PSA): ICD-10-CM

## 2022-11-28 DIAGNOSIS — G31.9 CEREBRAL ATROPHY (H): ICD-10-CM

## 2022-11-28 DIAGNOSIS — I69.30 LATE EFFECT OF CEREBROVASCULAR ACCIDENT (CVA): Primary | ICD-10-CM

## 2022-11-28 DIAGNOSIS — I73.9 PERIPHERAL VASCULAR DISEASE (H): Primary | ICD-10-CM

## 2022-11-28 PROCEDURE — 36415 COLL VENOUS BLD VENIPUNCTURE: CPT | Performed by: FAMILY MEDICINE

## 2022-11-28 PROCEDURE — 99214 OFFICE O/P EST MOD 30 MIN: CPT | Performed by: FAMILY MEDICINE

## 2022-11-28 RX ORDER — LISINOPRIL 5 MG/1
10 TABLET ORAL DAILY
Qty: 180 TABLET | Refills: 3 | Status: SHIPPED | OUTPATIENT
Start: 2022-11-28 | End: 2023-12-27

## 2022-11-28 RX ORDER — SODIUM PHOSPHATE,MONO-DIBASIC 19G-7G/118
1 ENEMA (ML) RECTAL DAILY
COMMUNITY

## 2022-11-28 NOTE — TELEPHONE ENCOUNTER
Pt referred to [facility] by Jarret Tay MD for PVD      QuantaFlo PAD test result in media tab from 8/19/21.  Unable to see any other HERNESTO results in chart or Care Everywhere.     Pt needs to be scheduled for HERNESTO US and consult with vascular medicine.  Will route to scheduling to coordinate an appointment at next available.    Appointment note: Pt referred to [facility] by Jarret Tay MD for PVD. HERNESTO prior      Jil FARFAN, RN    [facility]  [facility]  Office: [phone]  Fax: [phone]

## 2022-11-28 NOTE — PROGRESS NOTES
Problem(s) Oriented visit        SUBJECTIVE:                                                    Aiden Valdez is a 67 year old male who presents to clinic today for the following health issues :    1. History Cerebrovascular accident (CVA) due to embolism of cerebral artery (H)  No new symptoms  - aspirin (ASA) 81 MG EC tablet; Take 2 tablets (162 mg) by mouth daily  Dispense: 180 tablet; Refill: 1    2. 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 Love    3. Essential hypertension  he has Hypertension which is currently not well controlled. he has been compliant with his medications and is here today to follow up on the this issue and see if we can't work on better control of the blood pressure.    Reviewed last 6 BP readings in chart:  BP Readings from Last 6 Encounters:   11/28/22 (!) 156/96   05/27/22 136/80   02/08/22 130/86   01/31/22 116/78   08/05/21 115/72   07/30/21 126/76     he  has not experienced any significant side effects from current medications for hypertension.    NO active cardiac complaints or symptoms with exercise.    - Basic Metabolic Panel (8) ([facility])    4. High prostate specific antigen (PSA)  Will recheck today but also set up for urology consult was low grade cancer in past and now psa has shot up  - PSA Serum ([facility])       Problem list, Medication list, Allergies, and Medical/Social/Surgical histories reviewed in [facility] and updated as appropriate.   Additional history: as documented    ROS:  General and Resp. completed and negative except as noted above    Histories:   Patient Active Problem List   Diagnosis     Hypercholesteremia     Advanced directives, counseling/discussion     Family history of prostate cancer     Essential hypertension     Headache, unspecified headache type     History of CVA (cerebrovascular accident)     High prostate specific antigen (PSA)     Vision loss in left eye as late effect of cerebrovascular  "accident (CVA)     Peripheral vascular disease (H)     Past Surgical History:   Procedure Laterality Date     EXTRACAPSULAR CATARACT EXTRATION WITH INTRAOCULAR LENS IMPLANT       PROSTATE BIOPSY, NEEDLE, SATURATION SAMPLING         Social History     Tobacco Use     Smoking status: Never     Smokeless tobacco: Never   Substance Use Topics     Alcohol use: Yes     Comment: 1-3 week     Family History   Problem Relation Age of Onset     Respiratory Father      C.A.D. Mother      Cerebrovascular Disease Mother      Hypertension Mother      Prostate Cancer Brother            OBJECTIVE:                                                    BP (!) 156/96   Pulse 83   Resp 16   Ht 1.67 m (5' 5.75\")   Wt 70.6 kg (155 lb 9.6 oz)   SpO2 99%   BMI 25.31 kg/m    Body mass index is 25.31 kg/m .   APPEARANCE: = Relaxed and in no distress  Conj/Eyelids = noninjected and lids and lashes are without inflammation  PERRLA/Irises = Pupils Round Reactive to Light and Irisis without inflammation  Neck = No anterior or posterior adenopathy appreciated.  Thyroid = Not enlarged and no masses felt  Resp effort = Calm regular breathing  Breath Sounds = Good air movement with no rales or rhonchi in any lung fields  Heart Rate, Rhythm, & sounds (no Murm)  = Regular rate and rhythm with no S3, S4, or murmur appreciated.  Carotid Art's = Pulses full and equal and no bruits appreciated  Abdomen = Soft, nontender, no masses, & bowel sounds in all quadrants  Liver/Spleen = Normal span and no splenomegaly noted  Digits and Nails = FROM in all finger joints, no nail dystrophy  Ext (edema) = No pretibial edema noted or elsewhere  Musculsktl =  Strength and ROM of major joints are within normal limits  SKIN = absent significant rashes or lesions   Recent/Remote Memory = Alert and Oriented x 3  Mood/Affect = Cooperative and interested     ASSESSMENT/PLAN:                                                        Aiden was seen today for " recheck.    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)  MRI in 2021    History of Cerebrovascular accident (CVA) due to embolism of cerebral artery (H)  No new symptoms  -     aspirin (ASA) 81 MG EC tablet; Take 2 tablets (162 mg) by mouth daily    Essential hypertension  Not at goal, increase the lisinopril to 10 daily  -     Basic Metabolic Panel (8) ([facility])  -     lisinopril (ZESTRIL) 5 MG tablet; Take 2 tablets (10 mg) by mouth daily    High prostate specific antigen (PSA)  -     PSA Serum ([facility])  -     Adult Urology  Referral - To a [facility] Location (Use POS/Location); Future    Peripheral vascular disease (H)  See vasc surgery for left sided HERNESTO of 0.7  -     Vascular Surgery Referral; Future        Regular exercise  See Patient Instructions  There are no Patient Instructions on file for this visit.    The following health maintenance items are reviewed in Epic and correct as of today:  Health Maintenance   Topic Date Due     DTAP/TDAP/TD IMMUNIZATION (3 - Td or Tdap) 12/31/2022     MEDICARE ANNUAL WELLNESS VISIT  01/31/2023     FALL RISK ASSESSMENT  01/31/2023     COLORECTAL CANCER SCREENING  04/09/2023     LIPID  01/24/2027     ADVANCE CARE PLANNING  01/31/2027     HEPATITIS C SCREENING  Completed     PHQ-2 (once per calendar year)  Completed     INFLUENZA VACCINE  Completed     Pneumococcal Vaccine: 65+ Years  Completed     ZOSTER IMMUNIZATION  Completed     AORTIC ANEURYSM SCREENING (SYSTEM ASSIGNED)  Completed     COVID-19 Vaccine  Completed     IPV IMMUNIZATION  Aged Out     MENINGITIS IMMUNIZATION  Aged Out       Jarret Tay MD  [facility]  Family Practice  [facility]  [phone]    For any issues my office # is [phone]

## 2022-11-29 LAB
BUN SERPL-MCNC: 17 MG/DL (ref 8–27)
BUN/CREATININE RATIO: 20 (ref 10–24)
CALCIUM SERPL-MCNC: 9.7 MG/DL (ref 8.6–10.2)
CHLORIDE SERPLBLD-SCNC: 102 MMOL/L (ref 96–106)
CREAT SERPL-MCNC: 0.86 MG/DL (ref 0.76–1.27)
EGFR: 94 ML/MIN/1.73
GLUCOSE SERPL-MCNC: 97 MG/DL (ref 70–99)
POTASSIUM SERPL-SCNC: 4.8 MMOL/L (ref 3.5–5.2)
PSA NG/ML: 15.6 NG/ML (ref 0–4)
SODIUM SERPL-SCNC: 140 MMOL/L (ref 134–144)
TOTAL CO2: 26 MMOL/L (ref 20–29)

## 2022-11-30 NOTE — TELEPHONE ENCOUNTER
We are unable to schedule patient's Ultrasound and Consult Appointment due to the fact he has [employer] Medicare Advantage. Patient was advised.

## 2022-12-05 ENCOUNTER — TELEPHONE (OUTPATIENT)
Dept: FAMILY MEDICINE | Facility: CLINIC | Age: 67
End: 2022-12-05

## 2022-12-05 NOTE — TELEPHONE ENCOUNTER
Called and spoke with patient regarding elevated PSA test result on 12/2/22. Patient will call and schedule office visit with urology. Referral previously faxed, labs and office visit note faxed 12/2/22. Lenora Sanchez

## 2022-12-05 NOTE — TELEPHONE ENCOUNTER
----- Message from Jarret Tay MD sent at 11/22/2022 12:38 PM CST -----  PSA has jumped up and I would like him to see Urology please.  KN

## 2022-12-08 ENCOUNTER — TRANSFERRED RECORDS (OUTPATIENT)
Dept: FAMILY MEDICINE | Facility: CLINIC | Age: 67
End: 2022-12-08

## 2022-12-09 ENCOUNTER — MEDICAL CORRESPONDENCE (OUTPATIENT)
Dept: FAMILY MEDICINE | Facility: CLINIC | Age: 67
End: 2022-12-09

## 2022-12-20 ENCOUNTER — TRANSFERRED RECORDS (OUTPATIENT)
Dept: FAMILY MEDICINE | Facility: CLINIC | Age: 67
End: 2022-12-20

## 2022-12-23 ENCOUNTER — TELEPHONE (OUTPATIENT)
Dept: FAMILY MEDICINE | Facility: CLINIC | Age: 67
End: 2022-12-23

## 2022-12-23 NOTE — TELEPHONE ENCOUNTER
Patient referral and records for vascular surgery faxed to [facility] due to [employer] insurance patient is unable to see providers at [facility]. Lenora Sanchez

## 2023-01-05 NOTE — PATIENT INSTRUCTIONS
Patient Education   Personalized Prevention Plan  You are due for the preventive services outlined below.  Your care team is available to assist you in scheduling these services.  If you have already completed any of these items, please share that information with your care team to update in your medical record.  Health Maintenance Due   Topic Date Due    PHQ-2 (once per calendar year)  01/01/2023    Diptheria Tetanus Pertussis (DTAP/TDAP/TD) Vaccine (3 - Td or Tdap) 12/31/2022    FALL RISK ASSESSMENT  01/31/2023

## 2023-01-05 NOTE — PROGRESS NOTES
"  SUBJECTIVE:   Aiden Valdez is a 67 year old male who presents for Preventive Visit.    Patient has been advised of split billing requirements and indicates understanding: Yes  Are you in the first 12 months of your Medicare Part B coverage?  No    Physical Health:    In general, how would you rate your overall physical health? good    Outside of work, how many days during the week do you exercise? 4-5 days/week    Outside of work, approximately how many minutes a day do you exercise?greater than 60 minutes    If you drink alcohol do you typically have >3 drinks per day or >7 drinks per week? No    Do you usually eat at least 4 servings of fruit and vegetables a day, include whole grains & fiber and avoid regularly eating high fat or \"junk\" foods? Yes    Do you have any problems taking medications regularly?  No    Do you have any side effects from medications? none    Needs assistance for the following daily activities: no assistance needed    Which of the following safety concerns are present in your home?  none identified     Hearing impairment: No - see below    In the past 6 months, have you been bothered by leaking of urine? no    HEARING FREQUENCY:     Right Ear:     500 Hz : Pass   1000 Hz: Pass   2000 Hz: Pass   4000 Hz: Pass  Left Ear:     500 Hz : Pass   1000 Hz: Pass   2000 Hz: Pass   4000 Hz: Pass    Lolita Meade CMA      Mental Health:    In general, how would you rate your overall mental or emotional health? good  PHQ-2 Score:      Do you feel safe in your environment? Yes    Have you ever done Advance Care Planning? (For example, a Health Directive, POLST, or a discussion with a medical provider or your loved ones about your wishes): Yes, advance care planning is on file.    Fall risk:       Cognitive Screenin) Repeat 3 items (Leader, Season, Table)    2) Clock draw: NORMAL  3) 3 item recall: Recalls 2 objects   Results: NORMAL clock, 1-2 items recalled: COGNITIVE IMPAIRMENT LESS " "LIKELY    Mini-CogTM Copyright S Jaquan. Licensed by the author for use in [facility]; reprinted with permission (brittani@.[facility]). All rights reserved.      Do you have sleep apnea, excessive snoring or daytime drowsiness?: yes    Reviewed and updated as needed this visit by clinical staff   Tobacco  Allergies  Meds              Reviewed and updated as needed this visit by Provider                 Social History     Tobacco Use     Smoking status: Never     Smokeless tobacco: Never   Substance Use Topics     Alcohol use: Yes     Comment: 1-3 week                           Current providers sharing in care for this patient include:   Patient Care Team:  Jarret Tay MD as PCP - General (Family Practice)  Jarret Tay MD as Assigned PCP    The following health maintenance items are reviewed in Epic and correct as of today:  Health Maintenance   Topic Date Due     PHQ-2 (once per calendar year)  01/01/2023     DTAP/TDAP/TD IMMUNIZATION (3 - Td or Tdap) 12/31/2022     FALL RISK ASSESSMENT  01/31/2023     COLORECTAL CANCER SCREENING  04/09/2023     EYE EXAM  09/21/2023     MEDICARE ANNUAL WELLNESS VISIT  01/06/2024     LIPID  01/24/2027     ADVANCE CARE PLANNING  01/31/2027     HEPATITIS C SCREENING  Completed     INFLUENZA VACCINE  Completed     Pneumococcal Vaccine: 65+ Years  Completed     ZOSTER IMMUNIZATION  Completed     AORTIC ANEURYSM SCREENING (SYSTEM ASSIGNED)  Completed     COVID-19 Vaccine  Completed     IPV IMMUNIZATION  Aged Out     MENINGITIS IMMUNIZATION  Aged Out     Lab work is in process      ROS:  Constitutional, HEENT, cardiovascular, pulmonary, GI, , musculoskeletal, neuro, skin, endocrine and psych systems are negative, except as otherwise noted.    OBJECTIVE:   /82   Pulse 69   Ht 1.683 m (5' 6.25\")   Wt 71.8 kg (158 lb 3.2 oz)   SpO2 97%   BMI 25.34 kg/m   Estimated body mass index is 25.34 kg/m  as calculated from the following:    Height as of this " "encounter: 1.683 m (5' 6.25\").    Weight as of this encounter: 71.8 kg (158 lb 3.2 oz).  EXAM:   GENERAL: healthy, alert and no distress  EYES: Eyes grossly normal to inspection, PERRL and conjunctivae and sclerae normal  HENT: ear canals and TM's normal, nose and mouth without ulcers or lesions  NECK: no adenopathy, no asymmetry, masses, or scars and thyroid normal to palpation  RESP: lungs clear to auscultation - no rales, rhonchi or wheezes  CV: regular rate and rhythm, normal S1 S2, no S3 or S4, no murmur, click or rub, no peripheral edema and peripheral pulses strong  ABDOMEN: soft, nontender, no hepatosplenomegaly, no masses and bowel sounds normal  MS: no gross musculoskeletal defects noted, no edema  SKIN: no suspicious lesions or rashes left foot specifically has good hair growth and there is no temp difference bilaterally  NEURO: Normal strength and tone, mentation intact and speech normal  PSYCH: mentation appears normal, affect normal/bright    Diagnostic Test Results:  Labs reviewed in Epic    ASSESSMENT / PLAN:   Aiden was seen today for physical and hearing screening.    Diagnoses and all orders for this visit:    Encounter for Medicare annual wellness exam    Hypercholesteremia  Hyperlipidemia  Discussed current lipid results, previous results (if available) current guidelines (NCEP) for treatment and goals for lipids.    Discussed ongoing lifestyle modification, dietary changes (low fat, low simple carb) and regular aerobic exercise.    Discussed the link between dysmetabolic syndrome and impaired glucose tolerance seen in certain patterns of lipids.     Reviewed medication use for lipid lowering, including the statins are their possible side effects of myalgias, rhabdomyolysis, and liver toxicity.  We today managed his prescriptions with refills ensured to ensure availabilty of current medications.  Discussed the importance for aggressive management of dyslipidemia to prevent vascular complications " "later.     Instructed to contact me if he develop any intolerance to the treatment.    -     Cancel: Lipid Profile ([facility])  -     Lipid Panel ([facility])    Elevated PSA  Getting biopsy this month, MRI looked good    Essential hypertension  Reviewed his current HTN management. Discussed our goal for him is a systolic pressure at or below 128 and diastolic pressure at or below 83.  We today managed his prescriptions with refills ensured to ensure availabilty of current medications.  Discussed the importance for aggressive management of HTN to prevent vascular complications later.  Recommended lower fat, lower carbohydrate, and lower sodium (<2000 mg)diet. Required intervals for follow up on HTN, lab studies reviewed.    Strongly recommened he follow his blood pressures outside the clinic to ensure that BPs are remaining within guidelines,.  Instructed to contact me if the readings are not within guidelines on a regular basis so we can adjust treatment as needed.    -     CBC with Diff/Plt ([facility])  -     Comp. Metabolic Panel (14) ([facility])  -     VENOUS COLLECTION    Cerebral atrophy (H) seen on MRI 5/21  Noted as above no cognitive issues noted.    Peripheral vascular disease (H) Left sided LE  Seeing [facility] provider on 1/16 to follow up on the lyndsay of 0.7 on the left    History of CVA (cerebrovascular accident)Left eye mildy diminished  Follows with op.      Patient has been advised of split billing requirements and indicates understanding: Yes    COUNSELING:  Reviewed preventive health counseling, as reflected in patient instructions       Regular exercise       Healthy diet/nutrition    Estimated body mass index is 25.34 kg/m  as calculated from the following:    Height as of this encounter: 1.683 m (5' 6.25\").    Weight as of this encounter: 71.8 kg (158 lb 3.2 oz).        He reports that he has never smoked. He has never used smokeless tobacco.    Appropriate preventive services were discussed with this patient, " including applicable screening as appropriate for cardiovascular disease, diabetes, osteopenia/osteoporosis, and glaucoma.  As appropriate for age/gender, discussed screening for colorectal cancer, prostate cancer, breast cancer, and cervical cancer. Checklist reviewing preventive services available has been given to the patient.    Reviewed patients plan of care and provided an AVS. The Basic Care Plan (routine screening as documented in Health Maintenance) for Aiden meets the Care Plan requirement. This Care Plan has been established and reviewed with the Patient.    Counseling Resources:  ATP IV Guidelines  Pooled Cohorts Equation Calculator  Breast Cancer Risk Calculator  BRCA-Related Cancer Risk Assessment: FHS-7 Tool  FRAX Risk Assessment  ICSI Preventive Guidelines  Dietary Guidelines for Americans, 2010  USDA's MyPlate  ASA Prophylaxis  Lung CA Screening    Jarret Tay MD  [facility]

## 2023-01-06 ENCOUNTER — OFFICE VISIT (OUTPATIENT)
Dept: FAMILY MEDICINE | Facility: CLINIC | Age: 68
End: 2023-01-06

## 2023-01-06 VITALS
DIASTOLIC BLOOD PRESSURE: 82 MMHG | BODY MASS INDEX: 25.43 KG/M2 | HEIGHT: 66 IN | WEIGHT: 158.2 LBS | HEART RATE: 69 BPM | OXYGEN SATURATION: 97 % | SYSTOLIC BLOOD PRESSURE: 132 MMHG

## 2023-01-06 DIAGNOSIS — Z86.73 HISTORY OF CVA (CEREBROVASCULAR ACCIDENT): ICD-10-CM

## 2023-01-06 DIAGNOSIS — E78.00 HYPERCHOLESTEREMIA: ICD-10-CM

## 2023-01-06 DIAGNOSIS — G31.9 CEREBRAL ATROPHY (H): ICD-10-CM

## 2023-01-06 DIAGNOSIS — I73.9 PERIPHERAL VASCULAR DISEASE (H): ICD-10-CM

## 2023-01-06 DIAGNOSIS — R97.20 ELEVATED PSA: ICD-10-CM

## 2023-01-06 DIAGNOSIS — Z00.00 ENCOUNTER FOR MEDICARE ANNUAL WELLNESS EXAM: Primary | ICD-10-CM

## 2023-01-06 DIAGNOSIS — I10 ESSENTIAL HYPERTENSION: ICD-10-CM

## 2023-01-06 PROBLEM — C61 PROSTATE CANCER (H): Status: ACTIVE | Noted: 2023-01-06

## 2023-01-06 PROBLEM — C61 PROSTATE CANCER (H): Status: RESOLVED | Noted: 2023-01-06 | Resolved: 2023-01-06

## 2023-01-06 LAB
% GRANULOCYTES: 79.4 % (ref 42.2–75.2)
HCT VFR BLD AUTO: 41.7 % (ref 39–51)
HEMOGLOBIN: 14.4 G/DL (ref 13.4–17.5)
LYMPHOCYTES NFR BLD AUTO: 15.2 % (ref 20.5–51.1)
MCH RBC QN AUTO: 30 PG (ref 27–31)
MCHC RBC AUTO-ENTMCNC: 34.4 G/DL (ref 33–37)
MCV RBC AUTO: 87.1 FL (ref 80–100)
MONOCYTES NFR BLD AUTO: 5.4 % (ref 1.7–9.3)
PLATELET # BLD AUTO: 382 K/UL (ref 140–450)
RBC # BLD AUTO: 4.79 X10/CMM (ref 4.2–5.9)
WBC # BLD AUTO: 9.7 X10/CMM (ref 3.8–11)

## 2023-01-06 PROCEDURE — 85025 COMPLETE CBC W/AUTO DIFF WBC: CPT | Performed by: FAMILY MEDICINE

## 2023-01-06 PROCEDURE — G0439 PPPS, SUBSEQ VISIT: HCPCS | Performed by: FAMILY MEDICINE

## 2023-01-06 PROCEDURE — 99214 OFFICE O/P EST MOD 30 MIN: CPT | Mod: 25 | Performed by: FAMILY MEDICINE

## 2023-01-06 PROCEDURE — 36415 COLL VENOUS BLD VENIPUNCTURE: CPT | Performed by: FAMILY MEDICINE

## 2023-01-06 NOTE — LETTER
[facility] [address] [place]  [address]  Phone: [phone]    January 12, 2023      Aiden Courtney  [address]    [place] [address]              Dear Aiden,     I am writing to report that your included test results are as expected.     Many labs contain some results that are slightly outside of the normal range, I have reviewed any of these results and they require no changes at this time.     Jarret Tay MD       Results for orders placed or performed in visit on 01/06/23   CBC with Diff/Plt ([facility])     Status: Abnormal   Result Value Ref Range    WBC x10/cmm 9.7 3.8 - 11.0 x10/cmm    % Lymphocytes 15.2 (A) 20.5 - 51.1 %    % Monocytes 5.4 1.7 - 9.3 %    % Granulocytes 79.4 (A) 42.2 - 75.2 %    RBC x10/cmm 4.79 4.2 - 5.9 x10/cmm    Hemoglobin 14.4 13.4 - 17.5 g/dl    Hematocrit 41.7 39 - 51 %    MCV 87.1 80 - 100 fL    MCH 30.0 27.0 - 31.0 pg    MCHC 34.4 33.0 - 37.0 g/dL    Platelet Count 382 140 - 450 K/uL   Comp. Metabolic Panel (14) ([facility])     Status: None   Result Value Ref Range    Glucose 89 70 - 99 mg/dL    Urea Nitrogen 21 8 - 27 mg/dL    Creatinine 0.95 0.76 - 1.27 mg/dL    eGFR  87 >59 mL/min/1.73    BUN/Creatinine Ratio 22 10 - 24    Sodium 140 134 - 144 mmol/L    Potassium 4.4 3.5 - 5.2 mmol/L    Chloride 102 96 - 106 mmol/L    Total CO2 26 20 - 29 mmol/L    Calcium 9.5 8.6 - 10.2 mg/dL    Protein Total 6.9 6.0 - 8.5 g/dL    Albumin 4.5 3.8 - 4.8 g/dL    Globulin, Total 2.4 1.5 - 4.5 g/dL    A/G Ratio 1.9 1.2 - 2.2    Bilirubin Total 0.5 0.0 - 1.2 mg/dL    Alkaline Phosphatase 48 44 - 121 IU/L    AST 25 0 - 40 IU/L    ALT 12 0 - 44 IU/L    Narrative    Performed at:  01 - [facility] [address], [place], [place]  [id number]  : Akhil Baxter MD, Phone:  [phone]   Lipid Panel ([facility])     Status: Abnormal   Result Value Ref Range    Cholesterol 187 100 - 199 mg/dL    Triglycerides 59 0 - 149 mg/dL    HDL Cholesterol 57 >39  mg/dL    VLDL Cholesterol George 11 5 - 40 mg/dL    LDL Cholesterol Calculated 119 (H) 0 - 99 mg/dL    LDL/HDL Ratio 2.1 0.0 - 3.6 ratio    Narrative    Performed at:  01 - [facility] [address], [place], [place]  [id number]  : Akhil Baxter MD, Phone:  [phone]

## 2023-01-07 LAB
ALBUMIN SERPL-MCNC: 4.5 G/DL (ref 3.8–4.8)
ALBUMIN/GLOB SERPL: 1.9 {RATIO} (ref 1.2–2.2)
ALP SERPL-CCNC: 48 IU/L (ref 44–121)
ALT SERPL-CCNC: 12 IU/L (ref 0–44)
AST SERPL-CCNC: 25 IU/L (ref 0–40)
BILIRUB SERPL-MCNC: 0.5 MG/DL (ref 0–1.2)
BUN SERPL-MCNC: 21 MG/DL (ref 8–27)
BUN/CREATININE RATIO: 22 (ref 10–24)
CALCIUM SERPL-MCNC: 9.5 MG/DL (ref 8.6–10.2)
CHLORIDE SERPLBLD-SCNC: 102 MMOL/L (ref 96–106)
CHOLEST SERPL-MCNC: 187 MG/DL (ref 100–199)
CREAT SERPL-MCNC: 0.95 MG/DL (ref 0.76–1.27)
EGFR: 87 ML/MIN/1.73
GLOBULIN, TOTAL: 2.4 G/DL (ref 1.5–4.5)
GLUCOSE SERPL-MCNC: 89 MG/DL (ref 70–99)
HDLC SERPL-MCNC: 57 MG/DL
LDL/HDL RATIO: 2.1 RATIO (ref 0–3.6)
LDLC SERPL CALC-MCNC: 119 MG/DL (ref 0–99)
POTASSIUM SERPL-SCNC: 4.4 MMOL/L (ref 3.5–5.2)
PROT SERPL-MCNC: 6.9 G/DL (ref 6–8.5)
SODIUM SERPL-SCNC: 140 MMOL/L (ref 134–144)
TOTAL CO2: 26 MMOL/L (ref 20–29)
TRIGL SERPL-MCNC: 59 MG/DL (ref 0–149)
VLDLC SERPL CALC-MCNC: 11 MG/DL (ref 5–40)

## 2023-01-12 NOTE — RESULT ENCOUNTER NOTE
Dear Aiden,     I am writing to report that your included test results are as expected.    Many labs contain some results that are slightly outside of the normal range, I have reviewed any of these results and they require no changes at this time.    Jarret Tay MD

## 2023-01-23 ENCOUNTER — TRANSFERRED RECORDS (OUTPATIENT)
Dept: FAMILY MEDICINE | Facility: CLINIC | Age: 68
End: 2023-01-23

## 2023-01-27 ENCOUNTER — TRANSFERRED RECORDS (OUTPATIENT)
Dept: FAMILY MEDICINE | Facility: CLINIC | Age: 68
End: 2023-01-27

## 2023-03-02 ENCOUNTER — TRANSFERRED RECORDS (OUTPATIENT)
Dept: FAMILY MEDICINE | Facility: CLINIC | Age: 68
End: 2023-03-02

## 2023-04-06 DIAGNOSIS — I63.40 CEREBROVASCULAR ACCIDENT (CVA) DUE TO EMBOLISM OF CEREBRAL ARTERY (H): ICD-10-CM

## 2023-04-06 DIAGNOSIS — E78.00 HYPERCHOLESTEREMIA: ICD-10-CM

## 2023-04-07 RX ORDER — FENOFIBRATE 145 MG/1
TABLET, COATED ORAL
Qty: 90 TABLET | Refills: 3 | Status: SHIPPED | OUTPATIENT
Start: 2023-04-07 | End: 2024-02-23

## 2023-04-07 RX ORDER — PRAVASTATIN SODIUM 10 MG
TABLET ORAL
Qty: 90 TABLET | Refills: 3 | Status: SHIPPED | OUTPATIENT
Start: 2023-04-07 | End: 2024-02-23

## 2023-04-20 ENCOUNTER — TRANSFERRED RECORDS (OUTPATIENT)
Dept: FAMILY MEDICINE | Facility: CLINIC | Age: 68
End: 2023-04-20

## 2023-05-31 ENCOUNTER — TRANSFERRED RECORDS (OUTPATIENT)
Dept: FAMILY MEDICINE | Facility: CLINIC | Age: 68
End: 2023-05-31

## 2023-08-15 ENCOUNTER — OFFICE VISIT (OUTPATIENT)
Dept: FAMILY MEDICINE | Facility: CLINIC | Age: 68
End: 2023-08-15

## 2023-08-15 VITALS
OXYGEN SATURATION: 97 % | DIASTOLIC BLOOD PRESSURE: 84 MMHG | WEIGHT: 164.8 LBS | HEART RATE: 72 BPM | BODY MASS INDEX: 26.4 KG/M2 | SYSTOLIC BLOOD PRESSURE: 138 MMHG

## 2023-08-15 DIAGNOSIS — M54.2 CERVICAL PAIN: ICD-10-CM

## 2023-08-15 DIAGNOSIS — L29.0 RECTAL ITCHING: Primary | ICD-10-CM

## 2023-08-15 PROCEDURE — 99213 OFFICE O/P EST LOW 20 MIN: CPT | Performed by: FAMILY MEDICINE

## 2023-08-15 NOTE — PROGRESS NOTES
"Itching rectum  And pain in the necke with left shoulder paresthesia  Working with chiro in the past and it helped..  Problem(s) Oriented visit      ROS:  General and Resp. completed and negative except as noted above     HISTORY:   reports current alcohol use.   reports that he has never smoked. He has never used smokeless tobacco.    Past Medical History:   Diagnosis Date     Acute arterial ischemic stroke, multifocal, posterior circulation (H) 05/2017     AR (allergic rhinitis)      HTN (hypertension)      Hypercholesteremia      Periocular dermatitides      Past Surgical History:   Procedure Laterality Date     EXTRACAPSULAR CATARACT EXTRATION WITH INTRAOCULAR LENS IMPLANT       PROSTATE BIOPSY, NEEDLE, SATURATION SAMPLING         EXAM:  BP: 138/84[seca average bp reading[   Pulse: 72    Temp: Data Unavailable    Wt Readings from Last 2 Encounters:   08/15/23 74.8 kg (164 lb 12.8 oz)   01/06/23 71.8 kg (158 lb 3.2 oz)       BMI= Body mass index is 26.4 kg/m .    EXAM:  APPEARANCE: = Relaxed and in no distress  Neck =  Some pain with flexion into arm.    Resp effort = Calm regular breathing  Rectal = Anus without lesions, no polyps, Prostate nrl sized and no nodules  Recent/Remote Memory = Alert and Oriented x 3      Assessment/Plan:  Aiden was seen today for musculoskeletal problem, derm problem and health maintenance.    Diagnoses and all orders for this visit:    Rectal itching  vaseline for possible tear  Cervical pain  Continue work with chiro to open up nerve.    COUNSELING:   reports that he has never smoked. He has never used smokeless tobacco.    Estimated body mass index is 26.4 kg/m  as calculated from the following:    Height as of 1/6/23: 1.683 m (5' 6.25\").    Weight as of this encounter: 74.8 kg (164 lb 12.8 oz).       Appropriate preventive services were discussed with this patient, including applicable screening as appropriate for cardiovascular disease, diabetes, osteopenia/osteoporosis, and " glaucoma.  As appropriate for age/gender, discussed screening for colorectal cancer, prostate cancer, breast cancer, and cervical cancer. Checklist reviewing preventive services available has been given to the patient.    Reviewed patients plan of care and provided an AVS. The Basic Care Plan (routine screening as documented in Health Maintenance) for Aiden meets the Care Plan requirement. This Care Plan has been established and reviewed with the  Patient.      The following health maintenance items are reviewed in Epic and correct as of today:  Health Maintenance   Topic Date Due     COLORECTAL CANCER SCREENING  04/09/2023     INFLUENZA VACCINE (1) 09/01/2023     EYE EXAM  09/21/2023     MEDICARE ANNUAL WELLNESS VISIT  01/06/2024     FALL RISK ASSESSMENT  01/06/2024     LIPID  01/06/2028     ADVANCE CARE PLANNING  01/12/2028     DTAP/TDAP/TD IMMUNIZATION (3 - Td or Tdap) 01/11/2033     HEPATITIS C SCREENING  Completed     PHQ-2 (once per calendar year)  Completed     Pneumococcal Vaccine: 65+ Years  Completed     ZOSTER IMMUNIZATION  Completed     AORTIC ANEURYSM SCREENING (SYSTEM ASSIGNED)  Completed     COVID-19 Vaccine  Completed     IPV IMMUNIZATION  Aged Out     MENINGITIS IMMUNIZATION  Aged Out       Jarret Tay  [facility]  For any issues my office # is [phone]

## 2023-09-06 ENCOUNTER — TRANSFERRED RECORDS (OUTPATIENT)
Dept: FAMILY MEDICINE | Facility: CLINIC | Age: 68
End: 2023-09-06

## 2023-12-15 ENCOUNTER — TELEPHONE (OUTPATIENT)
Dept: FAMILY MEDICINE | Facility: CLINIC | Age: 68
End: 2023-12-15

## 2023-12-15 DIAGNOSIS — K64.4 EXTERNAL HEMORRHOIDS: Primary | ICD-10-CM

## 2023-12-15 RX ORDER — HYDROCORTISONE 25 MG/G
CREAM TOPICAL 2 TIMES DAILY PRN
Qty: 28 G | Refills: 0 | Status: SHIPPED | OUTPATIENT
Start: 2023-12-15

## 2023-12-27 DIAGNOSIS — I10 ESSENTIAL HYPERTENSION: ICD-10-CM

## 2023-12-27 RX ORDER — LISINOPRIL 5 MG/1
10 TABLET ORAL DAILY
Qty: 180 TABLET | Refills: 3 | Status: SHIPPED | OUTPATIENT
Start: 2023-12-27

## 2024-02-11 NOTE — DISCHARGE SUMMARY
[facility]  Discharge Summary        Aiden Valdez MRN# [id number]   YOB: 1955 Age: 61 year old     Date of Admission:  5/17/2017  Date of Discharge:  5/19/2017  Admitting Physician:  Marilin Gonzalez MD  Discharge Physician: Alessandro Vargas MD  Discharging Service: Hospitalist     Primary Provider: Jarret Baltazar  Primary Care Physician Phone Number: [phone]         Discharge Diagnoses:         Cerebrovascular accident (CVA), unspecified mechanism (H)  Nonintractable episodic headache, unspecified headache type  Mixed hyperlipidemia  Vitamin D deficiency        Problem Oriented Hospital Course (Providers):    Aiden Valdez was admitted on 5/17/2017 by Marilin Gonzalez MD and I would refer you to their history and physical.  The following problems were addressed during his hospitalization:    1. Acute/subacute right parietal stroke: Aside from visual field loss, neuro examination was essentially unremarkable.   ---Neurology consulted  ---kept the patient n.p.o. until he undergoes a formal speech/swallow evaluations.   --- Placed on telemetry and on normal sinus rrhythm  ----Echocardiogram with bubble studies negative  ----aspirin 324 mg p.o. Daily,  ----Intolerance for statin.   ---- Etiology unclear.   --- [facility] for 30 days  --- Follow up in out patient clinic with MRA in Neurology clinic with Dr Gaines in 4 weeks   -- Out patient OT to follow    2. Dyslipidemia:  He is intolerant of atorvastatin, simvastatin and rosuvastatin due to muscle aches. On Fenofibrate. fasting lipid profile , Cholesterol 222, triglyceride 151. Pravachol was started and later discontinued by neurology due to history of statin intolerance   3. Hypertension: At home he was on lisinopril 10 mg p.o. Daily and dose was increased 40 mg p.o. Daily for better SBP control. Recommend to follow up with his PCP      Patient was seen prior to discharge and found to be stable. He was  advised to follow with his PCP, Neurology and OT        Code Status:      Full Code        Brief Hospital Stay Summary Sent Home With Patient in AVS:        Reason for your hospital stay       CVA                        Important Results:      See below.        Pending Results:        Unresulted Labs Ordered in the Past 30 Days of this Admission     Date and Time Order Name Status Description    5/18/2017 0820 CRP cardiac risk In process             Discharge Instructions and Follow-Up:      Follow-up Appointments     Follow Up and recommended labs and tests       Follow up with primary care provider, Jarret Tay, within 7 days   for hospital follow- up.  No follow up labs or test are needed.            Follow Up and recommended labs and tests       Follow up with Dr Goyal in neurology clinic with MRA of the brain in 4   weeks.                      Discharge Disposition:      Discharged to home        Discharge Medications:        Current Discharge Medication List      START taking these medications    Details   aspirin  MG EC tablet Take 1 tablet (325 mg) by mouth daily  Qty: 60 tablet, Refills: 0    Associated Diagnoses: Cerebrovascular accident (CVA), unspecified mechanism (H)      cholecalciferol 2000 UNITS tablet Take 2,000 Units by mouth daily  Qty: 30 tablet, Refills: 0    Associated Diagnoses: Vitamin D deficiency         CONTINUE these medications which have CHANGED    Details   lisinopril (PRINIVIL/ZESTRIL) 40 MG tablet Take 1 tablet (40 mg) by mouth daily  Qty: 30 tablet, Refills: 0    Associated Diagnoses: Cerebrovascular accident (CVA), unspecified mechanism (H)         CONTINUE these medications which have NOT CHANGED    Details   Lactobacillus (PROBIOTIC ACIDOPHILUS PO) Take 100 mg by mouth 2 times daily as needed      Naphazoline-Pheniramine (OPCON-A OP) Place 1-2 drops into both eyes daily as needed (allergies)       fenofibrate 145 MG tablet Take 1 tablet (145 mg) by mouth daily  Qty:  "90 tablet, Refills: 3    Associated Diagnoses: Hypercholesteremia      triamcinolone (KENALOG) 0.1 % cream Apply sparingly to affected area three times daily as needed  Qty: 80 g, Refills: 0    Associated Diagnoses: Encounter for medication refill      FEXOFENADINE HCL PO Take 180 mg by mouth daily      hydrocortisone (PROCTOSOL HC) 2.5 % rectal cream place rectally twice daily as directed.  Qty: 28.35 g, Refills: 0    Associated Diagnoses: Preventative health care               Allergies:         Allergies   Allergen Reactions     Crestor [Rosuvastatin] Other (See Comments)     Muscle ache     Simvastatin Other (See Comments)     Muscle ache     Atorvastatin Other (See Comments)     Muscles ache           Consultations This Hospital Stay:      Consultation during this admission received from neurology        Condition and Physical on Discharge:      Discharge condition: Stable   Vitals: Heart Rate: 58, Blood pressure 133/89, pulse 57, temperature 97.6  F (36.4  C), temperature source Oral, resp. rate (P) 16, height 1.702 m (5' 7\"), weight 76.7 kg (169 lb), SpO2 96 %.     Constitutional: Awake, alert. No apparent distress   Lungs: Clear to auscultation bilaterally, no crackles or wheezing   Cardiovascular: Regular HR, normal S1 and S2, and no murmur noted   Abdomen: Normal bowel sounds, soft, non-distended, non-tender   Skin: No rashes, no cyanosis.   Other: LE: no edema                  Discharge Time:      Greater than 30 minutes.        Image Results From This Hospital Stay (For Non-EPIC Providers):        Results for orders placed or performed during the hospital encounter of 05/17/17   CT Head w/o Contrast    Narrative    CT OF THE HEAD WITHOUT CONTRAST  5/18/2017 12:53 AM     COMPARISON: None.    HISTORY: Headache, visual disturbance.    TECHNIQUE: 5 mm thick axial CT images of the head were acquired  without IV contrast material.    FINDINGS: There is a tiny focus of hypodensity in the cortex at the  medial " aspect of the right temporooccipital junction that could  represent a small recent infarct. There is no other evidence for  recent infarct. There is mild diffuse cerebral volume loss. There are  subtle patchy areas of decreased density in the cerebral white matter  bilaterally that are consistent with sequela of chronic small vessel  ischemic disease.    The ventricles and basal cisterns are within normal limits in  configuration given the degree of cerebral volume loss.  There is no  midline shift. There are no extra-axial fluid collections.    No intracranial hemorrhage or mass.    The visualized paranasal sinuses are well-aerated. There is no  mastoiditis. There are no fractures of the visualized bones.      Impression    IMPRESSION: Tiny focus of hypodensity at the right temporooccipital  junction that could represent a tiny recent infarct. Brain MRI may be  helpful for further evaluation. Diffuse cerebral volume loss and  cerebral white matter changes consistent with chronic small vessel  ischemic disease.     I concur with the preliminary report provided by overnight  Neuroradiologist (Dr. Garcia) from [facility] to JERALD FOREMANELY at 1:39 AM on 5/18/2017.    Radiation dose for this scan was reduced using automated exposure  control, adjustment of the mA and/or kV according to patient size, or  iterative reconstruction technique.    OXANA CARLSON MD   CTA Angiogram Head Neck    Narrative    CT ANGIOGRAM OF THE HEAD AND NECK WITHOUT AND WITH CONTRAST May 18,  2017 12:53 AM     HISTORY: Headache, visual disturbance.    TECHNIQUE: Precontrast localizing scans were followed by CT  angiography with an injection of 70mL [id number]-[id number] nonionic intravenous  contrast material with scans through the head and neck. Images were  transferred to a separate 3-D workstation where multiplanar  reformations and 3-D images were created. Estimates of carotid  stenoses are made relative to the distal internal carotid  no artery  diameters except as noted. Radiation dose for this scan was reduced  using automated exposure control, adjustment of the mA and/or kV  according to patient size, or iterative reconstruction technique.    COMPARISON: None.    FINDINGS:   Neck CTA: The common carotid arteries bilaterally are patent without  stenosis. There is minimal calcified atherosclerotic plaque at the  origins of the internal carotid arteries on both sides that does not  result in stenosis on either side. The cervical internal carotid  arteries bilaterally are tortuous but are patent without stenosis.  There is calcified atherosclerotic plaque at the origins of the  vertebral arteries on both sides resulting in mild narrowing of both  vertebral artery origins. The vertebral arteries bilaterally are  otherwise patent and unremarkable.    Head CTA: There is calcified atherosclerotic plaque of the  intracranial distal internal carotid arteries on both sides. There is  mild atherosclerotic narrowing of the supraclinoid distal internal  carotid arteries on both sides. There is mild irregularity in contour  without significant narrowing throughout the basilar artery suggesting  atherosclerotic disease. The bilateral anterior cerebral, bilateral  middle cerebral and left posterior cerebral arteries are patent and  unremarkable. There is moderate atherosclerotic narrowing of the P1  segment of the right posterior cerebral artery. There is severe  stenosis/subtotal occlusion of the mid P2 segment of the right  posterior cerebral artery. There is filling of two large branches of  the distal left posterior cerebral artery. The right posterior  communicating artery is patent but there are foci of narrowing within  it that are also likely atherosclerotic in nature.      Impression    IMPRESSION:  1. Focus of moderate narrowing of the P1 segment of the right  posterior cerebral artery. This may be atherosclerotic in nature.  2. Severe stenosis/subtotal  occlusion of the mid P2 segment of the  right posterior cerebral artery that may represent atherosclerotic  narrowing but could also represent thrombotic/embolic narrowing. There  is flow into the two large branches of the right posterior cerebral  artery.  3. Mild atherosclerotic narrowing of the supraclinoid distal internal  carotid arteries on both sides.  4. Calcified atherosclerotic plaque without significant narrowing at  the origins of the vertebral and internal carotid arteries  bilaterally.  5. Otherwise, normal neck and head CTA.    I concur with the preliminary report provided by overnight  Neuroradiologist (Dr. Garcia) from [facility] to Dr. Valdez at  1:39 AM on 5/18/2017.      OXANA CARLSON MD           Most Recent Lab Results In EPIC (For Non-EPIC Providers):    Most Recent 3 CBC's:  Recent Labs   Lab Test  05/17/17 2225   WBC  7.1   HGB  16.7   MCV  92   PLT  295      Most Recent 3 BMP's:  Recent Labs   Lab Test  05/19/17   0730  05/17/17 2225 02/01/17   0910   NA  142  140  141   POTASSIUM  4.1  3.8  5.0   CHLORIDE  110*  106  102   CO2  26  27   --    BUN  10  16  17  18   CR  0.79  0.79  0.94   ANIONGAP  6  7   --    LATRICIA  8.7  9.2  9.4   GLC  103*  118*  97     Most Recent 3 Troponin's:  Recent Labs   Lab Test  05/17/17 2225   TROPI  <0.015  The 99th percentile for upper reference range is 0.045 ug/L.  Troponin values in   the range of 0.045 - 0.120 ug/L may be associated with risks of adverse   clinical events.       Most Recent 3 INR's:  Recent Labs   Lab Test  05/17/17 2225   INR  1.03     Most Recent 2 LFT's:  Recent Labs   Lab Test  05/17/17 2225 02/01/17   0910   AST  18  19   ALT  21  16   ALKPHOS  53  54   BILITOTAL  0.5  0.4     Most Recent Cholesterol Panel:  Recent Labs   Lab Test  05/18/17   0820   CHOL  222*   LDL  141*   HDL  51   TRIG  151*     Most Recent 6 Bacteria Isolates From Any Culture (See EPIC Reports for Culture Details):No lab results found.  Most  Recent TSH, T4 and HgbA1c:   Recent Labs   Lab Test  05/18/17   0820   TSH  1.48   A1C  5.7            Cruz Vargas MD  Internal medicine Hospitalist:   Pager [phone]    5/19/2017

## 2024-02-23 NOTE — PATIENT INSTRUCTIONS
Preventive Care Advice   This is general advice given by our system to help you stay healthy. However, your care team may have specific advice just for you. Please talk to your care team about your preventive care needs.  Nutrition  Eat 5 or more servings of fruits and vegetables each day.  Try wheat bread, brown rice and whole grain pasta (instead of white bread, rice, and pasta).  Get enough calcium and vitamin D. Check the label on foods and aim for 100% of the RDA (recommended daily allowance).  Lifestyle  Exercise at least 150 minutes each week  (30 minutes a day, 5 days a week).  Do muscle strengthening activities 2 days a week. These help control your weight and prevent disease.  No smoking.  Wear sunscreen to prevent skin cancer.  Have a dental exam and cleaning every 6 months.  Yearly exams  See your health care team every year to talk about:  Any changes in your health.  Any medicines your care team has prescribed.  Preventive care, family planning, and ways to prevent chronic diseases.  Shots (vaccines)   HPV shots (up to age 26), if you've never had them before.  Hepatitis B shots (up to age 59), if you've never had them before.  COVID-19 shot: Get this shot when it's due.  Flu shot: Get a flu shot every year.  Tetanus shot: Get a tetanus shot every 10 years.  Pneumococcal, hepatitis A, and RSV shots: Ask your care team if you need these based on your risk.  Shingles shot (for age 50 and up)  General health tests  Diabetes screening:  Starting at age 35, Get screened for diabetes at least every 3 years.  If you are younger than age 35, ask your care team if you should be screened for diabetes.  Cholesterol test: At age 39, start having a cholesterol test every 5 years, or more often if advised.  Bone density scan (DEXA): At age 50, ask your care team if you should have this scan for osteoporosis (brittle bones).  Hepatitis C: Get tested at least once in your life.  STIs (sexually transmitted  infections)  Before age 24: Ask your care team if you should be screened for STIs.  After age 24: Get screened for STIs if you're at risk. You are at risk for STIs (including HIV) if:  You are sexually active with more than one person.  You don't use condoms every time.  You or a partner was diagnosed with a sexually transmitted infection.  If you are at risk for HIV, ask about PrEP medicine to prevent HIV.  Get tested for HIV at least once in your life, whether you are at risk for HIV or not.  Cancer screening tests  Cervical cancer screening: If you have a cervix, begin getting regular cervical cancer screening tests starting at age 21.  Breast cancer scan (mammogram): If you've ever had breasts, begin having regular mammograms starting at age 40. This is a scan to check for breast cancer.  Colon cancer screening: It is important to start screening for colon cancer at age 45.  Have a colonoscopy test every 10 years (or more often if you're at risk) Or, ask your provider about stool tests like a FIT test every year or Cologuard test every 3 years.  To learn more about your testing options, visit:   https://www.Route4Me/319108.pdf.  For help making a decision, visit:   https://bit.ly/vc27725.  Prostate cancer screening test: If you have a prostate, ask your care team if a prostate cancer screening test (PSA) at age 55 is right for you.  Lung cancer screening: If you are a current or former smoker ages 50 to 80, ask your care team if ongoing lung cancer screenings are right for you.  For informational purposes only. Not to replace the advice of your health care provider. Copyright   2023 [employer]. All rights reserved. Clinically reviewed by the [facility] Transitions Program. ulike 297404 - REV 01/24.

## 2024-02-27 ENCOUNTER — OFFICE VISIT (OUTPATIENT)
Dept: FAMILY MEDICINE | Facility: CLINIC | Age: 69
End: 2024-02-27

## 2024-02-27 VITALS
OXYGEN SATURATION: 98 % | BODY MASS INDEX: 24.74 KG/M2 | WEIGHT: 157.6 LBS | HEART RATE: 60 BPM | SYSTOLIC BLOOD PRESSURE: 128 MMHG | DIASTOLIC BLOOD PRESSURE: 80 MMHG | HEIGHT: 67 IN

## 2024-02-27 DIAGNOSIS — G31.9 CEREBRAL ATROPHY (H): ICD-10-CM

## 2024-02-27 DIAGNOSIS — Z00.00 ENCOUNTER FOR MEDICARE ANNUAL WELLNESS EXAM: Primary | ICD-10-CM

## 2024-02-27 DIAGNOSIS — I10 ESSENTIAL HYPERTENSION: ICD-10-CM

## 2024-02-27 DIAGNOSIS — Z86.73 HISTORY OF CVA (CEREBROVASCULAR ACCIDENT): ICD-10-CM

## 2024-02-27 DIAGNOSIS — E78.00 HYPERCHOLESTEREMIA: ICD-10-CM

## 2024-02-27 DIAGNOSIS — C61 PROSTATE CANCER (H): ICD-10-CM

## 2024-02-27 DIAGNOSIS — I69.30 LATE EFFECT OF CEREBROVASCULAR ACCIDENT (CVA): ICD-10-CM

## 2024-02-27 PROBLEM — I73.9 PERIPHERAL VASCULAR DISEASE (H): Status: RESOLVED | Noted: 2022-11-28 | Resolved: 2024-02-27

## 2024-02-27 LAB
% GRANULOCYTES: 79.5 % (ref 42.2–75.2)
ALBUMIN SERPL BCG-MCNC: 4.4 G/DL (ref 3.5–5.2)
ALP SERPL-CCNC: 50 U/L (ref 40–150)
ALT SERPL W P-5'-P-CCNC: 16 U/L (ref 0–70)
ANION GAP SERPL CALCULATED.3IONS-SCNC: 13 MMOL/L (ref 7–15)
AST SERPL W P-5'-P-CCNC: 20 U/L (ref 0–45)
BILIRUB SERPL-MCNC: 0.4 MG/DL
BUN SERPL-MCNC: 20.3 MG/DL (ref 8–23)
CALCIUM SERPL-MCNC: 9.9 MG/DL (ref 8.8–10.2)
CHLORIDE SERPL-SCNC: 104 MMOL/L (ref 98–107)
CHOLESTEROL: 229 MG/DL (ref 100–199)
CREAT SERPL-MCNC: 0.99 MG/DL (ref 0.67–1.17)
DEPRECATED HCO3 PLAS-SCNC: 25 MMOL/L (ref 22–29)
EGFRCR SERPLBLD CKD-EPI 2021: 82 ML/MIN/1.73M2
FASTING?: YES
GLUCOSE SERPL-MCNC: 99 MG/DL (ref 70–99)
HCT VFR BLD AUTO: 45.6 % (ref 39–51)
HDL (RMG): 47 MG/DL (ref 40–?)
HEMOGLOBIN: 15.2 G/DL (ref 13.4–17.5)
LDL CALCULATED (RMG): 166 MG/DL (ref 0–130)
LYMPHOCYTES NFR BLD AUTO: 15 % (ref 20.5–51.1)
MCH RBC QN AUTO: 29.8 PG (ref 27–31)
MCHC RBC AUTO-ENTMCNC: 33.3 G/DL (ref 33–37)
MCV RBC AUTO: 89.7 FL (ref 80–100)
MONOCYTES NFR BLD AUTO: 5.5 % (ref 1.7–9.3)
PLATELET # BLD AUTO: 313 K/UL (ref 140–450)
POTASSIUM SERPL-SCNC: 4.5 MMOL/L (ref 3.4–5.3)
PROT SERPL-MCNC: 8.1 G/DL (ref 6.4–8.3)
PSA SERPL DL<=0.01 NG/ML-MCNC: 8.73 NG/ML (ref 0–6.5)
RBC # BLD AUTO: 5.08 X10/CMM (ref 4.2–5.9)
SODIUM SERPL-SCNC: 142 MMOL/L (ref 135–145)
TRIGLYCERIDES (RMG): 79 MG/DL (ref 0–149)
WBC # BLD AUTO: 7.1 X10/CMM (ref 3.8–11)

## 2024-02-27 PROCEDURE — 99214 OFFICE O/P EST MOD 30 MIN: CPT | Mod: 25 | Performed by: FAMILY MEDICINE

## 2024-02-27 PROCEDURE — 80053 COMPREHEN METABOLIC PANEL: CPT | Mod: ORL | Performed by: FAMILY MEDICINE

## 2024-02-27 PROCEDURE — 80053 COMPREHEN METABOLIC PANEL: CPT | Performed by: FAMILY MEDICINE

## 2024-02-27 PROCEDURE — G0439 PPPS, SUBSEQ VISIT: HCPCS | Performed by: FAMILY MEDICINE

## 2024-02-27 PROCEDURE — 84153 ASSAY OF PSA TOTAL: CPT | Performed by: FAMILY MEDICINE

## 2024-02-27 PROCEDURE — 36415 COLL VENOUS BLD VENIPUNCTURE: CPT | Performed by: FAMILY MEDICINE

## 2024-02-27 PROCEDURE — 80061 LIPID PANEL: CPT | Mod: QW | Performed by: FAMILY MEDICINE

## 2024-02-27 PROCEDURE — 85025 COMPLETE CBC W/AUTO DIFF WBC: CPT | Performed by: FAMILY MEDICINE

## 2024-02-27 RX ORDER — PRAVASTATIN SODIUM 10 MG
10 TABLET ORAL DAILY
Qty: 90 TABLET | Refills: 3 | Status: SHIPPED | OUTPATIENT
Start: 2024-02-27

## 2024-02-27 RX ORDER — FENOFIBRATE 145 MG/1
145 TABLET, COATED ORAL DAILY
Qty: 90 TABLET | Refills: 3 | Status: SHIPPED | OUTPATIENT
Start: 2024-02-27

## 2024-02-27 SDOH — HEALTH STABILITY: PHYSICAL HEALTH: ON AVERAGE, HOW MANY DAYS PER WEEK DO YOU ENGAGE IN MODERATE TO STRENUOUS EXERCISE (LIKE A BRISK WALK)?: 4 DAYS

## 2024-02-27 SDOH — HEALTH STABILITY: PHYSICAL HEALTH: ON AVERAGE, HOW MANY MINUTES DO YOU ENGAGE IN EXERCISE AT THIS LEVEL?: 60 MIN

## 2024-02-27 ASSESSMENT — SOCIAL DETERMINANTS OF HEALTH (SDOH): HOW OFTEN DO YOU GET TOGETHER WITH FRIENDS OR RELATIVES?: ONCE A WEEK

## 2024-02-27 NOTE — PROGRESS NOTES
Preventive Care Visit  [facility]  Jarret Tay MD, Family Medicine  Feb 27, 2024    Assessment & Plan     Encounter for Medicare annual wellness exam    Hypercholesteremia  Now tolerating low dose pravastatin and we will refill and check levels and adjust pending results.    - Lipid Profile ([facility])  - fenofibrate (TRICOR) 145 MG tablet  Dispense: 90 tablet; Refill: 3  - pravastatin (PRAVACHOL) 10 MG tablet  Dispense: 90 tablet; Refill: 3    Essential hypertension  Reviewed his current HTN management. Discussed our goal for him is a systolic pressure at or below 128 and diastolic pressure at or below 83.  We today managed his prescriptions with refills ensured to ensure availabilty of current medications.  Discussed the importance for aggressive management of HTN to prevent vascular complications later.  Recommended lower fat, lower carbohydrate, and lower sodium (<2000 mg)diet. Required intervals for follow up on HTN, lab studies reviewed.    Strongly recommened he follow his blood pressures outside the clinic to ensure that BPs are remaining within guidelines,.  Instructed to contact me if the readings are not within guidelines on a regular basis so we can adjust treatment as needed.    - CBC with Diff/Plt ([facility])  - Comprehensive metabolic panel  - Comprehensive metabolic panel    Prostate cancer (H)  Urology notes reviewed, next follow up this month  - Prostate Specific Antigen Screen  - PSA tumor marker  - PSA tumor marker    History of CVA (cerebrovascular accident) in 2016  Resulting in   Vision loss in left eye as late effect of cerebrovascular accident (CVA)  No further symptoms on statin    Cerebral atrophy (H24)  Cognitively doing well  - pravastatin (PRAVACHOL) 10 MG tablet  Dispense: 90 tablet; Refill: 3      MED REC REQUIRED  Post Medication Reconciliation Status:     Counseling  Appropriate preventive services were discussed with this patient, including applicable screening as  appropriate for fall prevention, nutrition, physical activity, Tobacco-use cessation, weight loss and cognition.  Checklist reviewing preventive services available has been given to the patient.  Reviewed patient's diet, addressing concerns and/or questions.   He is at risk for psychosocial distress and has been provided with information to reduce risk.       FUTURE APPOINTMENTS:       - Follow-up for annual visit or as needed  Regular exercise    No follow-ups on file.    Rafal Wolfe is a 68 year old, presenting for the following:  Physical (Fasting/), Hearing Screening, Health Maintenance (Would like to get Covid vaccine/), and ER F/U (12/8/23 covid and flu A follow up )          Health Care Directive  Patient has a Health Care Directive on file  Advance care planning document is on file and is current.    HPI  Here for check up  And to follow up on htn,  Cerebreal atrophy        2/27/2024   General Health   How would you rate your overall physical health? Good   Feel stress (tense, anxious, or unable to sleep) Only a little   (!) STRESS CONCERN      2/27/2024   Nutrition   Diet: Regular (no restrictions)         2/27/2024   Exercise   Days per week of moderate/strenous exercise 4 days   Average minutes spent exercising at this level 60 min         2/27/2024   Social Factors   Frequency of gathering with friends or relatives Once a week   Worry food won't last until get money to buy more No   Food not last or not have enough money for food? No   Do you have housing?  Yes   Are you worried about losing your housing? No   Lack of transportation? No   Unable to get utilities (heat,electricity)? Yes   Want help with housing or utility concern? No   (!) FINANCIAL RESOURCE STRAIN CONCERN      2/27/2024   Fall Risk   Fallen 2 or more times in the past year? No   Trouble with walking or balance? No          2/27/2024   Activities of Daily Living- Home Safety   Needs help with the following daily activites None of  the above   Safety concerns in the home None of the above         2/27/2024   Dental   Dentist two times every year? Yes         2/27/2024   Hearing Screening   Hearing concerns? None of the above      2/27/2024   Hearing Screen Results    Right Ear - 500Hz/25dB Pass    Right Ear - 1000Hz/20dB Pass    Right Ear - 2000Hz/20dB Pass    Right Ear - 4000Hz/20dB Pass    Left Ear - 500Hz/25dB Pass    Left Ear - 1000Hz/20dB Pass    Left Ear - 2000Hz/20dB Pass    Left Ear - 4000Hz/20dB Pass            2/27/2024   Driving Risk Screening   Patient/family members have concerns about driving No         2/27/2024   General Alertness/Fatigue Screening   Have you been more tired than usual lately? No         2/27/2024   Urinary Incontinence Screening   Bothered by leaking urine in past 6 months No         2/27/2024   TB Screening   Were you born outside of [place]?  (!) YES             Today's PHQ-2 Score:       2/27/2024     7:57 AM   PHQ-2 ( 1999 Pfizer)   Q1: Little interest or pleasure in doing things 0   Q2: Feeling down, depressed or hopeless 0   PHQ-2 Score 0         2/27/2024   Substance Use   Alcohol more than 3/day or more than 7/wk No   Do you have a current opioid prescription? No   How severe/bad is pain from 1 to 10? 0/10 (No Pain)   Do you use any other substances recreationally? No     Social History     Tobacco Use    Smoking status: Never    Smokeless tobacco: Never   Substance Use Topics    Alcohol use: Yes     Alcohol/week: 1.0 - 3.0 standard drink of alcohol     Types: 1 - 3 Glasses of wine per week     Comment: 1-3 week    Drug use: No           2/27/2024   AAA Screening   Family history of Abdominal Aortic Aneurysm (AAA)? No   ASCVD Risk   The ASCVD Risk score (Marilee FOREMAN, et al., 2019) failed to calculate for the following reasons:    The patient has a prior MI or stroke diagnosis            Reviewed and updated as needed this visit by Provider   Tobacco  Allergies  Meds  Problems  Med Hx  Surg Hx   "Fam Hx            Lab work is in process  Current providers sharing in care for this patient include:  Patient Care Team:  Jarret Tay MD as PCP - General (Family Practice)  Jarret Tay MD as Assigned PCP    The following health maintenance items are reviewed in Epic and correct as of today:  Health Maintenance   Topic Date Due    RSV VACCINE (Pregnancy & 60+) (1 - 1-dose 60+ series) Never done    COVID-19 Vaccine (7 - 2023-24 season) 09/01/2023    EYE EXAM  09/21/2023    MEDICARE ANNUAL WELLNESS VISIT  02/27/2025    LIPID  02/27/2025    FALL RISK ASSESSMENT  02/27/2025    GLUCOSE  01/06/2026    COLORECTAL CANCER SCREENING  05/31/2028    ADVANCE CARE PLANNING  02/27/2029    DTAP/TDAP/TD IMMUNIZATION (3 - Td or Tdap) 01/11/2033    HEPATITIS C SCREENING  Completed    PHQ-2 (once per calendar year)  Completed    INFLUENZA VACCINE  Completed    Pneumococcal Vaccine: 65+ Years  Completed    ZOSTER IMMUNIZATION  Completed    IPV IMMUNIZATION  Aged Out    HPV IMMUNIZATION  Aged Out    MENINGITIS IMMUNIZATION  Aged Out    RSV MONOCLONAL ANTIBODY  Aged Out         Review of Systems  Constitutional, HEENT, cardiovascular, pulmonary, GI, , musculoskeletal, neuro, skin, endocrine and psych systems are negative, except as otherwise noted.     Objective    Exam  /80   Pulse 60   Ht 1.695 m (5' 6.75\")   Wt 71.5 kg (157 lb 9.6 oz)   SpO2 98%   BMI 24.87 kg/m     Estimated body mass index is 24.87 kg/m  as calculated from the following:    Height as of this encounter: 1.695 m (5' 6.75\").    Weight as of this encounter: 71.5 kg (157 lb 9.6 oz).    Physical Exam  GENERAL: alert and no distress  EYES: Eyes grossly normal to inspection, PERRL and conjunctivae and sclerae normal  HENT: ear canals and TM's normal, nose and mouth without ulcers or lesions  NECK: no adenopathy, no asymmetry, masses, or scars  RESP: lungs clear to auscultation - no rales, rhonchi or wheezes  CV: regular rate and rhythm, normal " S1 S2, no S3 or S4, no murmur, click or rub, no peripheral edema  ABDOMEN: soft, nontender, no hepatosplenomegaly, no masses and bowel sounds normal  MS: no gross musculoskeletal defects noted, no edema  SKIN: no suspicious lesions or rashes  NEURO: Normal strength and tone, mentation intact and speech normal  PSYCH: mentation appears normal, affect normal/bright        2/27/2024   Mini Cog   Clock Draw Score 2 Normal   3 Item Recall 3 objects recalled   Mini Cog Total Score 5            Signed Electronically by: Jarret Tay MD

## 2024-02-27 NOTE — LETTER
February 29, 2024      Aiden Valdez  [address]    [place] [address]              Dear Aiden,     I am writing to report that your included test results are as expected.  Lipids are up again.   Many labs contain some results that are slightly outside of the normal range, I have reviewed any of these results and they require no changes at this time.     Resulted Orders   CBC with Diff/Plt ([facility])   Result Value Ref Range    WBC x10/cmm 7.1 3.8 - 11.0 x10/cmm    % Lymphocytes 15.0 (A) 20.5 - 51.1 %    % Monocytes 5.5 1.7 - 9.3 %    % Granulocytes 79.5 (A) 42.2 - 75.2 %    RBC x10/cmm 5.08 4.2 - 5.9 x10/cmm    Hemoglobin 15.2 13.4 - 17.5 g/dl    Hematocrit 45.6 39 - 51 %    MCV 89.7 80 - 100 fL    MCH 29.8 27.0 - 31.0 pg    MCHC 33.3 33.0 - 37.0 g/dL    Platelet Count 313 140 - 450 K/uL   Lipid Profile ([facility])   Result Value Ref Range    Cholesterol 229 (A) 100 - 199 mg/dL    HDL 47 40 mg/dL    Triglycerides 79 0 - 149 mg/dL    LDL CALCULATED ([facility]) 166 (A) 0 - 130 mg/dL    Patient Fasting? Yes    Comprehensive metabolic panel   Result Value Ref Range    Sodium 142 135 - 145 mmol/L      Comment:      Reference intervals for this test were updated on 09/26/2023 to more accurately reflect our healthy population. There may be differences in the flagging of prior results with similar values performed with this method. Interpretation of those prior results can be made in the context of the updated reference intervals.     Potassium 4.5 3.4 - 5.3 mmol/L    Carbon Dioxide (CO2) 25 22 - 29 mmol/L    Anion Gap 13 7 - 15 mmol/L    Urea Nitrogen 20.3 8.0 - 23.0 mg/dL    Creatinine 0.99 0.67 - 1.17 mg/dL    GFR Estimate 82 >60 mL/min/1.73m2    Calcium 9.9 8.8 - 10.2 mg/dL    Chloride 104 98 - 107 mmol/L    Glucose 99 70 - 99 mg/dL    Alkaline Phosphatase 50 40 - 150 U/L      Comment:      Reference intervals for this test were updated on 11/14/2023 to more accurately reflect our healthy population. There  may be differences in the flagging of prior results with similar values performed with this method. Interpretation of those prior results can be made in the context of the updated reference intervals.    AST 20 0 - 45 U/L      Comment:      Reference intervals for this test were updated on 6/12/2023 to more accurately reflect our healthy population. There may be differences in the flagging of prior results with similar values performed with this method. Interpretation of those prior results can be made in the context of the updated reference intervals.    ALT 16 0 - 70 U/L      Comment:      Reference intervals for this test were updated on 6/12/2023 to more accurately reflect our healthy population. There may be differences in the flagging of prior results with similar values performed with this method. Interpretation of those prior results can be made in the context of the updated reference intervals.      Protein Total 8.1 6.4 - 8.3 g/dL    Albumin 4.4 3.5 - 5.2 g/dL    Bilirubin Total 0.4 <=1.2 mg/dL   PSA tumor marker   Result Value Ref Range    PSA Tumor Marker 8.73 (H) 0.00 - 6.50 ng/mL    Narrative    This result is obtained using the Roche Elecsys total PSA method on the axel [id number] immunoassay analyzer. Results obtained with different assay methods or kits cannot be used interchangeably.       If you have any questions or concerns, please call the clinic at the number listed above.       Sincerely,      Jarret Tay MD

## 2024-02-29 NOTE — RESULT ENCOUNTER NOTE
Dear Aiden,     I am writing to report that your included test results are as expected.  Lipids are up again.  Many labs contain some results that are slightly outside of the normal range, I have reviewed any of these results and they require no changes at this time.    Jarret Tay MD

## 2024-03-13 ENCOUNTER — TRANSFERRED RECORDS (OUTPATIENT)
Dept: FAMILY MEDICINE | Facility: CLINIC | Age: 69
End: 2024-03-13

## 2024-05-22 ENCOUNTER — OFFICE VISIT (OUTPATIENT)
Dept: FAMILY MEDICINE | Facility: CLINIC | Age: 69
End: 2024-05-22

## 2024-05-22 VITALS
OXYGEN SATURATION: 97 % | HEART RATE: 64 BPM | WEIGHT: 151.8 LBS | BODY MASS INDEX: 23.95 KG/M2 | SYSTOLIC BLOOD PRESSURE: 133 MMHG | DIASTOLIC BLOOD PRESSURE: 95 MMHG

## 2024-05-22 DIAGNOSIS — M25.511 ACUTE PAIN OF RIGHT SHOULDER: Primary | ICD-10-CM

## 2024-05-22 DIAGNOSIS — I10 ESSENTIAL HYPERTENSION: ICD-10-CM

## 2024-05-22 DIAGNOSIS — M19.011 PRIMARY OSTEOARTHRITIS OF RIGHT SHOULDER: ICD-10-CM

## 2024-05-22 PROCEDURE — 73020 X-RAY EXAM OF SHOULDER: CPT | Performed by: FAMILY MEDICINE

## 2024-05-22 PROCEDURE — G2211 COMPLEX E/M VISIT ADD ON: HCPCS | Performed by: FAMILY MEDICINE

## 2024-05-22 PROCEDURE — 99213 OFFICE O/P EST LOW 20 MIN: CPT | Performed by: FAMILY MEDICINE

## 2024-05-22 RX ORDER — FINASTERIDE 5 MG/1
TABLET, FILM COATED ORAL
COMMUNITY

## 2024-05-22 NOTE — PROGRESS NOTES
Problem(s) Oriented visit        SUBJECTIVE:                                                    Aiden Valdez is a 68 year old male who presents to clinic today for the following health issues :  Musculoskeletal Problem (Right shoulder wants to discuss PT ) and Derm Problem (Rash on left clavicle area , just noticed it over the weekend, no itchiness )    1. Acute pain of right shoulder  Slowly worsening over the past months now limited in ROM  - XR Shoulder Right G/E 3 Views; Future  - XR Shoulder Right G/E 3 Views  - Physical Therapy  Referral; Future         Problem list, Medication list, Allergies, and Medical/Social/Surgical histories reviewed in [facility] and updated as appropriate.   Additional history: as documented    ROS:  General and Resp. completed and negative except as noted above    Histories:   Patient Active Problem List   Diagnosis    Hypercholesteremia    Advanced directives, counseling/discussion    Family history of prostate cancer    Essential hypertension    Headache, unspecified headache type    History of CVA (cerebrovascular accident) in 2016    Raised prostate specific antigen    Vision loss in left eye as late effect of cerebrovascular accident (CVA)    Cerebral atrophy (H24)    Prostate cancer (H) Actively managed by Dr. Box at [facility]     Past Surgical History:   Procedure Laterality Date    EXTRACAPSULAR CATARACT EXTRATION WITH INTRAOCULAR LENS IMPLANT      PROSTATE BIOPSY, NEEDLE, SATURATION SAMPLING         Social History     Tobacco Use    Smoking status: Never    Smokeless tobacco: Never   Substance Use Topics    Alcohol use: Yes     Alcohol/week: 1.0 - 3.0 standard drink of alcohol     Types: 1 - 3 Glasses of wine per week     Comment: 1-3 week     Family History   Problem Relation Age of Onset    C.A.D. Mother     Cerebrovascular Disease Mother     Hypertension Mother     Respiratory Father     No Known Problems Brother     No Known Problems Brother     Prostate Cancer  Brother 73        Metastatic Cancer    No Known Problems Sister     No Known Problems Son     No Known Problems Daughter            OBJECTIVE:                                                    BP (!) 133/95   Pulse 64   Wt 68.9 kg (151 lb 12.8 oz)   SpO2 97%   BMI 23.95 kg/m    Body mass index is 23.95 kg/m .   APPEARANCE: = Relaxed and in no distress  Lips/Teeth/Gums = No lesions seen, good dentition, and gums seem healthy  Musculsktl: extremities normal- no gross deformities noted and decreased range of motion in the rright shoulder     ASSESSMENT/PLAN:                                                        Aiden was seen today for musculoskeletal problem and derm problem.    Diagnoses and all orders for this visit:    Acute pain of right shoulder  -     XR Shoulder Right G/E 3 Views; Future  -     XR Shoulder Right G/E 3 Views  -     Physical Therapy  Referral; Future    Primary osteoarthritis of right shoulder  -     Physical Therapy  Referral; Future    Essential hypertension        Regular exercise    The following health maintenance items are reviewed in Epic and correct as of today:  Health Maintenance   Topic Date Due    RSV VACCINE (Pregnancy & 60+) (1 - 1-dose 60+ series) Never done    COVID-19 Vaccine (7 - 2023-24 season) 09/01/2023    EYE EXAM  09/21/2023    MEDICARE ANNUAL WELLNESS VISIT  02/27/2025    LIPID  02/27/2025    FALL RISK ASSESSMENT  02/27/2025    GLUCOSE  02/27/2027    COLORECTAL CANCER SCREENING  05/31/2028    ADVANCE CARE PLANNING  02/27/2029    DTAP/TDAP/TD IMMUNIZATION (3 - Td or Tdap) 01/11/2033    HEPATITIS C SCREENING  Completed    PHQ-2 (once per calendar year)  Completed    INFLUENZA VACCINE  Completed    Pneumococcal Vaccine: 65+ Years  Completed    ZOSTER IMMUNIZATION  Completed    IPV IMMUNIZATION  Aged Out    HPV IMMUNIZATION  Aged Out    MENINGITIS IMMUNIZATION  Aged Out    RSV MONOCLONAL ANTIBODY  Aged Out       Jarret Tay MD  [facility]  GROUP  Family Practice  [facility]  [phone]    For any issues my office # is [phone]

## 2024-06-04 ENCOUNTER — TRANSFERRED RECORDS (OUTPATIENT)
Dept: FAMILY MEDICINE | Facility: CLINIC | Age: 69
End: 2024-06-04

## 2024-09-18 ENCOUNTER — TRANSFERRED RECORDS (OUTPATIENT)
Dept: FAMILY MEDICINE | Facility: CLINIC | Age: 69
End: 2024-09-18

## 2024-09-25 ENCOUNTER — TRANSFERRED RECORDS (OUTPATIENT)
Dept: FAMILY MEDICINE | Facility: CLINIC | Age: 69
End: 2024-09-25

## 2024-09-25 LAB — RETINOPATHY: NEGATIVE

## 2024-10-08 DIAGNOSIS — I10 ESSENTIAL HYPERTENSION: ICD-10-CM

## 2024-10-08 DIAGNOSIS — R21 RASH: ICD-10-CM

## 2024-10-08 RX ORDER — TRIAMCINOLONE ACETONIDE 1 MG/G
CREAM TOPICAL
Qty: 80 G | Refills: 0 | Status: SHIPPED | OUTPATIENT
Start: 2024-10-08

## 2024-10-08 RX ORDER — LISINOPRIL 5 MG/1
10 TABLET ORAL DAILY
Qty: 180 TABLET | Refills: 1 | Status: SHIPPED | OUTPATIENT
Start: 2024-10-08

## 2024-10-08 NOTE — TELEPHONE ENCOUNTER
Lisinopril 5mg--2 daily #180  Triamcinolone 0.1% cream -last ordered 1/4/22 #80gm    [facility] mail order    Last visit 05-22-24   Last CPX 02-27-24    BP Readings from Last 6 Encounters:   05/22/24 (!) 133/95   02/27/24 128/80   08/15/23 138/84   01/06/23 132/82   11/28/22 (!) 156/96   05/27/22 136/80      Component      Latest Ref Rng 2/27/2024  8:29 AM   Sodium      135 - 145 mmol/L 142    Potassium      3.4 - 5.3 mmol/L 4.5    Carbon Dioxide (CO2)      22 - 29 mmol/L 25    Anion Gap      7 - 15 mmol/L 13    Urea Nitrogen      8.0 - 23.0 mg/dL 20.3    Creatinine      0.67 - 1.17 mg/dL 0.99    GFR Estimate      >60 mL/min/1.73m2 82    Calcium      8.8 - 10.2 mg/dL 9.9    Chloride      98 - 107 mmol/L 104    Glucose      70 - 99 mg/dL 99    Alkaline Phosphatase      40 - 150 U/L 50    AST      0 - 45 U/L 20    ALT      0 - 70 U/L 16    Protein Total      6.4 - 8.3 g/dL 8.1    Albumin      3.5 - 5.2 g/dL 4.4    Bilirubin Total      <=1.2 mg/dL 0.4      Routed to Dr. Tay for review.  Kyleigh Taylor RN

## 2024-10-28 NOTE — IP AVS SNAPSHOT
MRN:[id number]                      After Visit Summary   5/17/2017    Aiden Valdez    MRN: [id number]           Thank you!     Thank you for choosing [facility] for your care. Our goal is always to provide you with excellent care. Hearing back from our patients is one way we can continue to improve our services. Please take a few minutes to complete the written survey that you may receive in the mail after you visit with us. Thank you!        Patient Information     Date Of Birth          1955        Designated Caregiver       Most Recent Value    Caregiver    Will someone help with your care after discharge? no      About your hospital stay     You were admitted on:  May 18, 2017 You last received care in the:  [facility]    You were discharged on:  May 19, 2017        Reason for your hospital stay       CVA                  Who to Call     For medical emergencies, please call 911.  For non-urgent questions about your medical care, please call your primary care provider or clinic, [phone]          Attending Provider     Provider Specialty    Andrew Silva MD Emergency Medicine    Marilin Gonzalez MD Internal Medicine    SamAlessandro ballard MD Internal Medicine       Primary Care Provider Office Phone # Fax #    Jarret Tay [phone]       [facility] [address] [place] [address]        After Care Instructions     Activity       Your activity upon discharge: activity as tolerated            Diet       Follow this diet upon discharge: Orders Placed This Encounter      Advance Diet as Tolerated: Regular Diet Adult                  Follow-up Appointments     Follow Up and recommended labs and tests       Follow up with primary care provider, Jarret Tay, within 7 days for hospital follow- up.  No follow up labs or test are needed.            Follow Up and recommended labs and tests        Attempted to reach patient in regards to results. Left voicemail.    Follow up with Dr Goyal in neurology clinic with MRA of the brain in 4 weeks.                  Additional Services     Occupational Therapy Referral       *This therapy referral will be filtered to a centralized scheduling office at [facility] and the patient will receive a call to schedule an appointment at a [facility] location most convenient for them. *     [facility] provides Occupational Therapy evaluation and treatment and many specialty services across the [facility] system.  If requesting a specialty program, please choose from the list below.    If you have not heard from the scheduling office within 2 business days, please call [phone] for all locations, with the exception of [place], please call [phone].     Treatment: Evaluation & Treatment  Special Instructions/Modalities:   Special Programs: Low Vision    Please be aware that coverage of these services is subject to the terms and limitations of your health insurance plan.  Call member services at your health plan with any benefit or coverage questions.      **Note to Provider:  If you are referring outside of [facility] for the therapy appointment, please list the name of the location in the  special instructions  above, print the referral and give to the patient to schedule the appointment.                  Future tests that were ordered for you     Cardiac Event Monitor - Peds/Adult           Cardiac Event Monitor - Peds/Adult                 Further instructions from your care team       Your risk factors for stroke or TIA (transient ischemic attack):    Your Risk Factors Your Results Normal Ranges   High blood pressure BP Readings from Last 1 Encounters:   05/19/17 133/89    Less than 120/80   Cholesterol              Total Lab Results   Component Value Date    CHOL 222 05/18/2017      Less than 150    Triglycerides   Lab Results   Component Value Date    TRIG 151 05/18/2017    Less than 150   LDL Lab  "Results   Component Value Date     05/18/2017       Less than 70   HDL Lab Results   Component Value Date    HDL 51 05/18/2017            Greater than 40 (men)  Greater than 50 (women)   Diabetes   Recent Labs  Lab 05/19/17  0730   *    Fasting blood glucose    Smoking/tobacco use  Quit smoking and tobacco   Overweight  Lose 1-2 pounds a week   Lack of exercise  30 minutes moderate activity each day   Other risk factors include carotid (neck) artery disease, atrial fibrillation and stress. You may be on new medicine to treat high blood pressure, cholesterol, diabetes or atrial fibrillation.    Understanding Stroke Booklet given to patient. Please refer to booklet for further information.    Stroke warning signs and symptoms - CALL 911 right away for:  - Sudden numbness or weakness in the face, arm or leg (often on one side of the body).  - Sudden confusion or trouble understanding what is going on.  - Sudden blurred or decreased vision in one or both eyes.  - Sudden trouble speaking, loss of balance, dizziness or problems with coordination.  - Sudden, severe headache for no reason.  - Fainting or seizures.  - Symptoms may go away then come back suddenly.      Pending Results     Date and Time Order Name Status Description    5/18/2017 0820 CRP cardiac risk In process             Statement of Approval     Ordered          05/19/17 1307  I have reviewed and agree with all the recommendations and orders detailed in this document.  EFFECTIVE NOW     Approved and electronically signed by:  Alessandro Vargas MD             Admission Information     Date & Time Provider Department Dept. Phone    5/17/2017 Alessandro Vargas MD [address] [facility] [phone]      Your Vitals Were     Blood Pressure Pulse Temperature Respirations Height Weight    133/89 57 97.6  F (36.4  C) (Oral) 16 1.702 m (5' 7\") 76.7 kg (169 lb)    Pulse Oximetry BMI (Body Mass Index)             " "   96% 26.47 kg/m2          [employer] Information     [employer] lets you send messages to your doctor, view your test results, renew your prescriptions, schedule appointments and more. To sign up, go to [email or web address] . Click on \"Log in\" on the left side of the screen, which will take you to the Welcome page. Then click on \"Sign up Now\" on the right side of the page.     You will be asked to enter the access code listed below, as well as some personal information. Please follow the directions to create your username and password.     Your access code is: [email or web address]  Expires: 2017  2:14 PM     Your access code will  in 90 days. If you need help or a new code, please call your [facility] clinic or [phone].        Care EveryWhere ID     This is your Care EveryWhere ID. This could be used by other organizations to access your [facility] medical records  [id number]           Review of your medicines      START taking        Dose / Directions    aspirin 325 MG EC tablet   Used for:  Cerebrovascular accident (CVA), unspecified mechanism (H)        Dose:  325 mg   Take 1 tablet (325 mg) by mouth daily   Quantity:  60 tablet   Refills:  0       cholecalciferol 2000 UNITS tablet   Used for:  Vitamin D deficiency        Dose:  2000 Units   Take 2,000 Units by mouth daily   Quantity:  30 tablet   Refills:  0         CONTINUE these medicines which may have CHANGED, or have new prescriptions. If we are uncertain of the size of tablets/capsules you have at home, strength may be listed as something that might have changed.        Dose / Directions    lisinopril 40 MG tablet   Commonly known as:  PRINIVIL/ZESTRIL   This may have changed:    - medication strength  - how much to take   Used for:  Cerebrovascular accident (CVA), unspecified mechanism (H)        Dose:  40 mg   Take 1 tablet (40 mg) by mouth daily   Quantity:  30 tablet   Refills:  0         CONTINUE these medicines which have NOT CHANGED        Dose " / Directions    fenofibrate 145 MG tablet   Used for:  Hypercholesteremia        Dose:  145 mg   Take 1 tablet (145 mg) by mouth daily   Quantity:  90 tablet   Refills:  3       FEXOFENADINE HCL PO        Dose:  180 mg   Take 180 mg by mouth daily   Refills:  0       hydrocortisone 2.5 % cream   Commonly known as:  PROCTOSOL HC   Used for:  Preventative health care        place rectally twice daily as directed.   Quantity:  28.35 g   Refills:  0       OPCON-A OP        Dose:  1-2 drop   Place 1-2 drops into both eyes daily as needed (allergies)   Refills:  0       PROBIOTIC ACIDOPHILUS PO   Indication:  Lower GI Symptoms        Dose:  100 mg   Take 100 mg by mouth 2 times daily as needed   Refills:  0       triamcinolone 0.1 % cream   Commonly known as:  KENALOG   Used for:  Encounter for medication refill        Apply sparingly to affected area three times daily as needed   Quantity:  80 g   Refills:  0            Where to get your medicines      These medications were sent to [facility] [facility] - [place], [place] - [address], [place] [address]     Phone:  [phone]     aspirin 325 MG EC tablet    cholecalciferol 2000 UNITS tablet    lisinopril 40 MG tablet                Protect others around you: Learn how to safely use, store and throw away your medicines at www.disposemymeds.org.             Medication List: This is a list of all your medications and when to take them. Check marks below indicate your daily home schedule. Keep this list as a reference.      Medications           Morning Afternoon Evening Bedtime As Needed    aspirin 325 MG EC tablet   Take 1 tablet (325 mg) by mouth daily   Last time this was given:  325 mg on 5/19/2017 11:13 AM   Next Dose Due:  5/20                                     cholecalciferol 2000 UNITS tablet   Take 2,000 Units by mouth daily   Last time this was given:  2,000 Units on 5/19/2017 11:56 AM   Next Dose Due:  5/20                                      fenofibrate 145 MG tablet   Take 1 tablet (145 mg) by mouth daily   Next Dose Due:  Resume home schedule                                   FEXOFENADINE HCL PO   Take 180 mg by mouth daily   Next Dose Due:  Resume home schedule                                   hydrocortisone 2.5 % cream   Commonly known as:  PROCTOSOL HC   place rectally twice daily as directed.   Next Dose Due:  Resume home schedule                                   lisinopril 40 MG tablet   Commonly known as:  PRINIVIL/ZESTRIL   Take 1 tablet (40 mg) by mouth daily   Last time this was given:  40 mg on 5/19/2017 11:56 AM   Next Dose Due:  5/20                                   OPCON-A OP   Place 1-2 drops into both eyes daily as needed (allergies)                                PROBIOTIC ACIDOPHILUS PO   Take 100 mg by mouth 2 times daily as needed   Next Dose Due:  Resume home schedule                                triamcinolone 0.1 % cream   Commonly known as:  KENALOG   Apply sparingly to affected area three times daily as needed   Next Dose Due:  Resume home schedule

## 2024-11-28 DIAGNOSIS — G31.9 CEREBRAL ATROPHY (H): ICD-10-CM

## 2024-11-28 DIAGNOSIS — E78.00 HYPERCHOLESTEREMIA: ICD-10-CM

## 2024-12-01 RX ORDER — FENOFIBRATE 145 MG/1
145 TABLET, COATED ORAL DAILY
Qty: 100 TABLET | Refills: 2 | Status: SHIPPED | OUTPATIENT
Start: 2024-12-01

## 2024-12-01 RX ORDER — PRAVASTATIN SODIUM 10 MG
10 TABLET ORAL DAILY
Qty: 100 TABLET | Refills: 2 | Status: SHIPPED | OUTPATIENT
Start: 2024-12-01

## 2024-12-02 DIAGNOSIS — I10 ESSENTIAL HYPERTENSION: ICD-10-CM

## 2024-12-03 RX ORDER — LISINOPRIL 5 MG/1
10 TABLET ORAL DAILY
Qty: 200 TABLET | Refills: 2 | Status: SHIPPED | OUTPATIENT
Start: 2024-12-03

## 2025-03-07 NOTE — PATIENT INSTRUCTIONS
Patient Education   Preventive Care Advice   This is general advice given by our system to help you stay healthy. However, your care team may have specific advice just for you. Please talk to your care team about your preventive care needs.  Nutrition  Eat 5 or more servings of fruits and vegetables each day.  Try wheat bread, brown rice and whole grain pasta (instead of white bread, rice, and pasta).  Get enough calcium and vitamin D. Check the label on foods and aim for 100% of the RDA (recommended daily allowance).  Lifestyle  Exercise at least 150 minutes each week  (30 minutes a day, 5 days a week).  Do muscle strengthening activities 2 days a week. These help control your weight and prevent disease.  No smoking.  Wear sunscreen to prevent skin cancer.  Have a dental exam and cleaning every 6 months.  Yearly exams  See your health care team every year to talk about:  Any changes in your health.  Any medicines your care team has prescribed.  Preventive care, family planning, and ways to prevent chronic diseases.  Shots (vaccines)   HPV shots (up to age 26), if you've never had them before.  Hepatitis B shots (up to age 59), if you've never had them before.  COVID-19 shot: Get this shot when it's due.  Flu shot: Get a flu shot every year.  Tetanus shot: Get a tetanus shot every 10 years.  Pneumococcal, hepatitis A, and RSV shots: Ask your care team if you need these based on your risk.  Shingles shot (for age 50 and up)  General health tests  Diabetes screening:  Starting at age 35, Get screened for diabetes at least every 3 years.  If you are younger than age 35, ask your care team if you should be screened for diabetes.  Cholesterol test: At age 39, start having a cholesterol test every 5 years, or more often if advised.  Bone density scan (DEXA): At age 50, ask your care team if you should have this scan for osteoporosis (brittle bones).  Hepatitis C: Get tested at least once in your life.  STIs (sexually  transmitted infections)  Before age 24: Ask your care team if you should be screened for STIs.  After age 24: Get screened for STIs if you're at risk. You are at risk for STIs (including HIV) if:  You are sexually active with more than one person.  You don't use condoms every time.  You or a partner was diagnosed with a sexually transmitted infection.  If you are at risk for HIV, ask about PrEP medicine to prevent HIV.  Get tested for HIV at least once in your life, whether you are at risk for HIV or not.  Cancer screening tests  Cervical cancer screening: If you have a cervix, begin getting regular cervical cancer screening tests starting at age 21.  Breast cancer scan (mammogram): If you've ever had breasts, begin having regular mammograms starting at age 40. This is a scan to check for breast cancer.  Colon cancer screening: It is important to start screening for colon cancer at age 45.  Have a colonoscopy test every 10 years (or more often if you're at risk) Or, ask your provider about stool tests like a FIT test every year or Cologuard test every 3 years.  To learn more about your testing options, visit:   .  For help making a decision, visit:   https://bit.ly/ec13884.  Prostate cancer screening test: If you have a prostate, ask your care team if a prostate cancer screening test (PSA) at age 55 is right for you.  Lung cancer screening: If you are a current or former smoker ages 50 to 80, ask your care team if ongoing lung cancer screenings are right for you.  For informational purposes only. Not to replace the advice of your health care provider. Copyright   2023 [employer]. All rights reserved. Clinically reviewed by the [facility] Transitions Program. [employer] 188457 - REV 01/24.

## 2025-03-10 ENCOUNTER — OFFICE VISIT (OUTPATIENT)
Dept: FAMILY MEDICINE | Facility: CLINIC | Age: 70
End: 2025-03-10

## 2025-03-10 VITALS
BODY MASS INDEX: 24.27 KG/M2 | DIASTOLIC BLOOD PRESSURE: 88 MMHG | HEART RATE: 59 BPM | HEIGHT: 66 IN | OXYGEN SATURATION: 98 % | SYSTOLIC BLOOD PRESSURE: 138 MMHG | WEIGHT: 151 LBS

## 2025-03-10 DIAGNOSIS — G31.9 CEREBRAL ATROPHY: ICD-10-CM

## 2025-03-10 DIAGNOSIS — Z00.00 ENCOUNTER FOR MEDICARE ANNUAL WELLNESS EXAM: ICD-10-CM

## 2025-03-10 DIAGNOSIS — I69.30 LATE EFFECT OF CEREBROVASCULAR ACCIDENT (CVA): ICD-10-CM

## 2025-03-10 DIAGNOSIS — E78.00 HYPERCHOLESTEREMIA: ICD-10-CM

## 2025-03-10 DIAGNOSIS — N52.9 VASCULOGENIC ERECTILE DYSFUNCTION, UNSPECIFIED VASCULOGENIC ERECTILE DYSFUNCTION TYPE: ICD-10-CM

## 2025-03-10 DIAGNOSIS — R21 RASH: ICD-10-CM

## 2025-03-10 DIAGNOSIS — F32.1 CURRENT MODERATE EPISODE OF MAJOR DEPRESSIVE DISORDER WITHOUT PRIOR EPISODE (H): ICD-10-CM

## 2025-03-10 DIAGNOSIS — I10 ESSENTIAL HYPERTENSION: ICD-10-CM

## 2025-03-10 DIAGNOSIS — C61 PROSTATE CANCER (H): ICD-10-CM

## 2025-03-10 DIAGNOSIS — Z76.0 ENCOUNTER FOR MEDICATION REFILL: ICD-10-CM

## 2025-03-10 LAB
% GRANULOCYTES: 63.1 % (ref 42.2–75.2)
ALBUMIN SERPL BCG-MCNC: 4.2 G/DL (ref 3.5–5.2)
ALP SERPL-CCNC: 47 U/L (ref 40–150)
ALT SERPL W P-5'-P-CCNC: 8 U/L (ref 0–70)
ANION GAP SERPL CALCULATED.3IONS-SCNC: 11 MMOL/L (ref 7–15)
AST SERPL W P-5'-P-CCNC: 17 U/L (ref 0–45)
BILIRUB SERPL-MCNC: 0.3 MG/DL
BUN SERPL-MCNC: 18.9 MG/DL (ref 8–23)
CALCIUM SERPL-MCNC: 9.9 MG/DL (ref 8.8–10.4)
CHLORIDE SERPL-SCNC: 101 MMOL/L (ref 98–107)
CHOLESTEROL: 157 MG/DL (ref 100–199)
CREAT SERPL-MCNC: 1.04 MG/DL (ref 0.67–1.17)
EGFRCR SERPLBLD CKD-EPI 2021: 77 ML/MIN/1.73M2
FASTING STATUS PATIENT QL REPORTED: YES
FASTING?: YES
GLUCOSE SERPL-MCNC: 102 MG/DL (ref 70–99)
HCO3 SERPL-SCNC: 27 MMOL/L (ref 22–29)
HCT VFR BLD AUTO: 43.8 % (ref 39–51)
HDL (RMG): 41 MG/DL (ref 40–?)
HEMOGLOBIN: 15 G/DL (ref 13.4–17.5)
LDL CALCULATED (RMG): 105 MG/DL (ref 0–130)
LYMPHOCYTES NFR BLD AUTO: 28.9 % (ref 20.5–51.1)
MCH RBC QN AUTO: 29.8 PG (ref 27–31)
MCHC RBC AUTO-ENTMCNC: 34.2 G/DL (ref 33–37)
MCV RBC AUTO: 87 FL (ref 80–100)
MONOCYTES NFR BLD AUTO: 8 % (ref 1.7–9.3)
PLATELET # BLD AUTO: 289 K/UL (ref 140–450)
POTASSIUM SERPL-SCNC: 4.4 MMOL/L (ref 3.4–5.3)
PROT SERPL-MCNC: 7.4 G/DL (ref 6.4–8.3)
PSA SERPL DL<=0.01 NG/ML-MCNC: 3.89 NG/ML (ref 0–6.5)
RBC # BLD AUTO: 5.04 X10/CMM (ref 4.2–5.9)
SODIUM SERPL-SCNC: 139 MMOL/L (ref 135–145)
TRIGLYCERIDES (RMG): 54 MG/DL (ref 0–149)
WBC # BLD AUTO: 5.1 X10/CMM (ref 3.8–11)

## 2025-03-10 PROCEDURE — 80061 LIPID PANEL: CPT | Mod: QW | Performed by: FAMILY MEDICINE

## 2025-03-10 PROCEDURE — 36415 COLL VENOUS BLD VENIPUNCTURE: CPT | Performed by: FAMILY MEDICINE

## 2025-03-10 PROCEDURE — 3075F SYST BP GE 130 - 139MM HG: CPT | Performed by: FAMILY MEDICINE

## 2025-03-10 PROCEDURE — 99214 OFFICE O/P EST MOD 30 MIN: CPT | Mod: 25 | Performed by: FAMILY MEDICINE

## 2025-03-10 PROCEDURE — 85025 COMPLETE CBC W/AUTO DIFF WBC: CPT | Performed by: FAMILY MEDICINE

## 2025-03-10 PROCEDURE — 3079F DIAST BP 80-89 MM HG: CPT | Performed by: FAMILY MEDICINE

## 2025-03-10 PROCEDURE — 84153 ASSAY OF PSA TOTAL: CPT | Mod: ORL | Performed by: FAMILY MEDICINE

## 2025-03-10 PROCEDURE — 80053 COMPREHEN METABOLIC PANEL: CPT | Mod: ORL | Performed by: FAMILY MEDICINE

## 2025-03-10 PROCEDURE — G0439 PPPS, SUBSEQ VISIT: HCPCS | Performed by: FAMILY MEDICINE

## 2025-03-10 RX ORDER — PRAVASTATIN SODIUM 10 MG
10 TABLET ORAL DAILY
Qty: 100 TABLET | Refills: 3 | Status: SHIPPED | OUTPATIENT
Start: 2025-03-10

## 2025-03-10 RX ORDER — KETOCONAZOLE 20 MG/ML
SHAMPOO, SUSPENSION TOPICAL DAILY PRN
Status: CANCELLED | OUTPATIENT
Start: 2025-03-10

## 2025-03-10 RX ORDER — KETOCONAZOLE 20 MG/ML
SHAMPOO, SUSPENSION TOPICAL PRN
Qty: 1290 ML | Refills: 3 | Status: SHIPPED | OUTPATIENT
Start: 2025-03-10 | End: 2025-03-10

## 2025-03-10 RX ORDER — SILDENAFIL 50 MG/1
50 TABLET, FILM COATED ORAL DAILY PRN
Qty: 30 TABLET | Refills: 1 | Status: SHIPPED | OUTPATIENT
Start: 2025-03-10

## 2025-03-10 RX ORDER — SERTRALINE HYDROCHLORIDE 25 MG/1
25 TABLET, FILM COATED ORAL DAILY
Qty: 90 TABLET | Refills: 2 | Status: SHIPPED | OUTPATIENT
Start: 2025-03-10

## 2025-03-10 RX ORDER — LISINOPRIL 5 MG/1
10 TABLET ORAL DAILY
Qty: 200 TABLET | Refills: 3 | Status: SHIPPED | OUTPATIENT
Start: 2025-03-10

## 2025-03-10 RX ORDER — TRIAMCINOLONE ACETONIDE 1 MG/G
CREAM TOPICAL
Qty: 80 G | Refills: 0 | Status: SHIPPED | OUTPATIENT
Start: 2025-03-10

## 2025-03-10 RX ORDER — FENOFIBRATE 145 MG/1
145 TABLET, COATED ORAL DAILY
Qty: 100 TABLET | Refills: 3 | Status: SHIPPED | OUTPATIENT
Start: 2025-03-10

## 2025-03-10 RX ORDER — SERTRALINE HYDROCHLORIDE 25 MG/1
25 TABLET, FILM COATED ORAL DAILY
Qty: 90 TABLET | Refills: 2 | Status: SHIPPED | OUTPATIENT
Start: 2025-03-10 | End: 2025-03-10

## 2025-03-10 SDOH — HEALTH STABILITY: PHYSICAL HEALTH: ON AVERAGE, HOW MANY DAYS PER WEEK DO YOU ENGAGE IN MODERATE TO STRENUOUS EXERCISE (LIKE A BRISK WALK)?: 4 DAYS

## 2025-03-10 ASSESSMENT — PATIENT HEALTH QUESTIONNAIRE - PHQ9
SUM OF ALL RESPONSES TO PHQ QUESTIONS 1-9: 16
SUM OF ALL RESPONSES TO PHQ QUESTIONS 1-9: 16
10. IF YOU CHECKED OFF ANY PROBLEMS, HOW DIFFICULT HAVE THESE PROBLEMS MADE IT FOR YOU TO DO YOUR WORK, TAKE CARE OF THINGS AT HOME, OR GET ALONG WITH OTHER PEOPLE: NOT DIFFICULT AT ALL

## 2025-03-10 ASSESSMENT — SOCIAL DETERMINANTS OF HEALTH (SDOH): HOW OFTEN DO YOU GET TOGETHER WITH FRIENDS OR RELATIVES?: ONCE A WEEK

## 2025-03-10 NOTE — PROGRESS NOTES
"Preventive Care Visit  [facility]  Jarret Tay MD, Family Medicine  Mar 10, 2025      Assessment & Plan     Encounter for Medicare annual wellness exam    - VENOUS COLLECTION    Prostate cancer (H) Actively managed by Dr. Box at [facility]  Check psa today    Hypercholesteremia  Has Hyperlipidemia   On listed medication  Last lab result are:  Cholesterol   Date Value Ref Range Status   02/27/2024 229 (A) 100 - 199 mg/dL Final   01/06/2023 187 100 - 199 mg/dL Final   01/24/2022 176 100 - 199 mg/dL Final     HDL Cholesterol   Date Value Ref Range Status   01/06/2023 57 >39 mg/dL Final   01/24/2022 47 >39 mg/dL Final     HDL   Date Value Ref Range Status   02/27/2024 47 >=40 mg/dL Final     LDL Cholesterol Calculated   Date Value Ref Range Status   01/06/2023 119 (H) 0 - 99 mg/dL Final   01/24/2022 112 (H) 0 - 99 mg/dL Final     LDL CALCULATED ([facility])   Date Value Ref Range Status   02/27/2024 166 (A) 0 - 130 mg/dL Final     Triglycerides   Date Value Ref Range Status   02/27/2024 79 0 - 149 mg/dL Final   01/06/2023 59 0 - 149 mg/dL Final   01/24/2022 90 0 - 149 mg/dL Final     No results found for: \"CHOLHDLRATIO\"  Will continue current medication or adjust based on lab results    - Lipid Profile ([facility])  - pravastatin (PRAVACHOL) 10 MG tablet  Dispense: 100 tablet; Refill: 3  - fenofibrate (TRICOR) 145 MG tablet  Dispense: 100 tablet; Refill: 3    Essential hypertension  Reviewed his current HTN management. Discussed our goal for him is a systolic pressure at or below 128 and diastolic pressure at or below 83.  We today managed his prescriptions with refills ensured to ensure availabilty of current medications.  Discussed the importance for aggressive management of HTN to prevent vascular complications later.  Recommended lower fat, lower carbohydrate, and lower sodium (<2000 mg)diet. Required intervals for follow up on HTN, lab studies reviewed.    Strongly recommened he follow his blood pressures " outside the clinic to ensure that BPs are remaining within guidelines,.  Instructed to contact me if the readings are not within guidelines on a regular basis so we can adjust treatment as needed.    - Comprehensive metabolic panel  - CBC with Diff/Plt ([facility])  - lisinopril (ZESTRIL) 5 MG tablet  Dispense: 200 tablet; Refill: 3    Vision loss in left eye as late effect of cerebrovascular accident (CVA)      Cerebral atrophy  Working to prevent further vascular   - pravastatin (PRAVACHOL) 10 MG tablet  Dispense: 100 tablet; Refill: 3    Rash      Encounter for medication refill  - ketoconazole (NIZORAL) 2 % external shampoo  Dispense: 1290 mL; Refill: 3  - triamcinolone (KENALOG) 0.1 % external cream  Dispense: 80 g; Refill: 0    Current moderate episode of major depressive disorder without prior episode (H)  Spoke at length about mood disorders including suspected pathophysiology, role of neurotransmitters, and treatment options including medication options ( especially SSRIs that treat cause of sx) and counselling.  Tolerating current meds. We discussed ongoing management of his  medications and how we will refill the medications now and in the future.        Patient has been advised of split billing requirements and indicates understanding: Yes        Counseling  Appropriate preventive services were addressed with this patient via screening, questionnaire, or discussion as appropriate for fall prevention, nutrition, physical activity, Tobacco-use cessation, social engagement, weight loss and cognition.  Checklist reviewing preventive services available has been given to the patient.  Reviewed patient's diet, addressing concerns and/or questions.   The patient's PHQ-9 score is consistent with moderate depression. He was provided with information regarding depression.       Work on weight loss    No follow-ups on file.    Rafal Wolfe is a 69 year old, presenting for the following:  Physical (Fasting -  physical.//No questions or concerns.) and Medication Request (Would like refills of triamcinolone cream and ketoconazole shampoo.)            HPI  Here for check up and to follow up on the above  Pos ed    Here to discuss the possibility of new depression. Has no previous history of depression.    Last PHQ-9 score on record=          3/10/2025     8:01 AM   PHQ-9 ([employer])   1.  Little interest or pleasure in doing things 2   2.  Feeling down, depressed, or hopeless 2   3.  Trouble falling or staying asleep, or sleeping too much 2   4.  Feeling tired or having little energy 2   5.  Poor appetite or overeating 2   6.  Feeling bad about yourself - or that you are a failure or have let yourself or your family down 2   7.  Trouble concentrating on things, such as reading the newspaper or watching television 2   8.  Moving or speaking so slowly that other people could have noticed. Or the opposite - being so fidgety or restless that you have been moving around a lot more than usual 2   9.  Thoughts that you would be better off dead, or of hurting yourself in some way 0   PHQ-9 Total Score 16    6.  Feeling bad about yourself 2   7.  Trouble concentrating 2   8.  Moving slowly or restless 2   9.  Suicidal or self-harm thoughts 0   1.  Little interest or pleasure in doing things More than half the days   2.  Feeling down, depressed, or hopeless More than half the days   3.  Trouble falling or staying asleep, or sleeping too much More than half the days   4.  Feeling tired or having little energy More than half the days   5.  Poor appetite or overeating More than half the days   6.  Feeling bad about yourself More than half the days   7.  Trouble concentrating More than half the days   8.  Moving slowly or restless More than half the days   9.  Suicidal or self-harm thoughts Not at all   PHQ-9 via [facility] TOTAL SCORE-----> 16 (Moderately severe depression)   Difficulty at work, home, or with people Not difficult at all        Patient-reported       Current Life stressors: cworking with     Not Suicidal and willing to make promise to call if that would change.          Advance Care Planning  Patient has a Health Care Directive on file    Hearing screen  Left ear:    500 HZ: Pass  1000 HZ: Pass  2000 HZ: Pass  4000 HZ: Pass    Right ear:  500 HZ: Pass  1000 HZ: Pass  2000 HZ: Pass  4000 HZ: Pass          3/10/2025   General Health   How would you rate your overall physical health? Good   Feel stress (tense, anxious, or unable to sleep) Only a little   (!) STRESS CONCERN      3/10/2025   Nutrition   Diet: Regular (no restrictions)    Low salt    Low fat/cholesterol       Multiple values from one day are sorted in reverse-chronological order         3/10/2025   Exercise   Days per week of moderate/strenous exercise 4 days         3/10/2025   Social Factors   Frequency of gathering with friends or relatives Once a week   Worry food won't last until get money to buy more No   Food not last or not have enough money for food? No   Do you have housing? (Housing is defined as stable permanent housing and does not include staying ouside in a car, in a tent, in an abandoned building, in an overnight shelter, or couch-surfing.) No   Are you worried about losing your housing? No   Lack of transportation? No   Unable to get utilities (heat,electricity)? No   Want help with housing or utility concern? No   (!) HOUSING CONCERN PRESENT      3/10/2025   Fall Risk   Fallen 2 or more times in the past year? No   Trouble with walking or balance? No          3/10/2025   Activities of Daily Living- Home Safety   Needs help with the following daily activites None of the above   Safety concerns in the home None of the above         3/10/2025   Dental   Dentist two times every year? Yes         3/10/2025   Hearing Screening   Hearing concerns? None of the above         3/10/2025   Driving Risk Screening   Patient/family members have concerns about driving No          3/10/2025   General Alertness/Fatigue Screening   Have you been more tired than usual lately? No         3/10/2025   Urinary Incontinence Screening   Bothered by leaking urine in past 6 months No         2/27/2024   TB Screening   Were you born outside of the [place]? (!) YES             Today's PHQ-2 Score:       3/10/2025     8:01 AM   PHQ-2 ( 1999 [employer])   Q1: Little interest or pleasure in doing things 2   Q2: Feeling down, depressed or hopeless 2   PHQ-2 Score 4    Q1: Little interest or pleasure in doing things More than half the days   Q2: Feeling down, depressed or hopeless More than half the days   PHQ-2 Score 4       Patient-reported         3/10/2025   Substance Use   Alcohol more than 3/day or more than 7/wk No   Do you have a current opioid prescription? No   How severe/bad is pain from 1 to 10? 1/10   Do you use any other substances recreationally? No     Social History     Tobacco Use    Smoking status: Never    Smokeless tobacco: Never   Substance Use Topics    Alcohol use: Yes     Alcohol/week: 1.0 - 3.0 standard drink of alcohol     Types: 1 - 3 Glasses of wine per week     Comment: 1-3 week    Drug use: No           3/10/2025   AAA Screening   Family history of Abdominal Aortic Aneurysm (AAA)? No   ASCVD Risk   The ASCVD Risk score (Marilee FOREMAN, et al., 2019) failed to calculate for the following reasons:    Risk score cannot be calculated because patient has a medical history suggesting prior/existing ASCVD            Reviewed and updated as needed this visit by Provider                    Lab work is in process  Current providers sharing in care for this patient include:  Patient Care Team:  Jarret Tay MD as PCP - General (Family Practice)  Jarret Tay MD as Assigned PCP    The following health maintenance items are reviewed in Epic and correct as of today:  Health Maintenance   Topic Date Due    EYE EXAM  09/21/2023    INFLUENZA VACCINE (1) 09/01/2024    COVID-19  "Vaccine (7 - 2024-25 season) 09/01/2024    PHQ-2 (once per calendar year)  01/01/2025    BMP  02/27/2025    LIPID  02/27/2025    MEDICARE ANNUAL WELLNESS VISIT  03/10/2026    FALL RISK ASSESSMENT  03/10/2026    GLUCOSE  02/27/2027    COLORECTAL CANCER SCREENING  05/31/2028    RSV VACCINE (1 - 1-dose 75+ series) 11/14/2028    ADVANCE CARE PLANNING  02/27/2029    DTAP/TDAP/TD IMMUNIZATION (3 - Td or Tdap) 01/11/2033    HEPATITIS C SCREENING  Completed    Pneumococcal Vaccine: 50+ Years  Completed    ZOSTER IMMUNIZATION  Completed    HPV IMMUNIZATION  Aged Out    MENINGITIS IMMUNIZATION  Aged Out         Review of Systems  Constitutional, HEENT, cardiovascular, pulmonary, GI, , musculoskeletal, neuro, skin, endocrine and psych systems are negative, except as otherwise noted.     Objective    Exam  There were no vitals taken for this visit.   Estimated body mass index is 23.95 kg/m  as calculated from the following:    Height as of 2/27/24: 1.695 m (5' 6.75\").    Weight as of 5/22/24: 68.9 kg (151 lb 12.8 oz).    Physical Exam  GENERAL: alert and no distress  EYES: Eyes grossly normal to inspection, PERRL and conjunctivae and sclerae normal  HENT: ear canals and TM's normal, nose and mouth without ulcers or lesions  NECK: no adenopathy, no asymmetry, masses, or scars  RESP: lungs clear to auscultation - no rales, rhonchi or wheezes  CV: regular rate and rhythm, normal S1 S2, no S3 or S4, no murmur, click or rub, no peripheral edema  ABDOMEN: soft, nontender, no hepatosplenomegaly, no masses and bowel sounds normal  MS: no gross musculoskeletal defects noted, no edema  SKIN: no suspicious lesions or rashes  NEURO: Normal strength and tone, mentation intact and speech normal  PSYCH: mentation appears normal, affect normal/bright                 2/27/2024     7:43 AM 3/10/2025     7:54 AM   MINI COG   Clock Draw Score 2 Normal 2 Normal   3 Item Recall 3 objects recalled 3 objects recalled   Mini Cog Total Score 5 5 "         Signed Electronically by: Jarret Tay MD    Answers submitted by the patient for this visit:  Patient Health Questionnaire (Submitted on 3/10/2025)  If you checked off any problems, how difficult have these problems made it for you to do your work, take care of things at home, or get along with other people?: Not difficult at all  PHQ9 TOTAL SCORE: 16

## 2025-03-10 NOTE — LETTER
March 11, 2025      Aiden Valdez  [address]    [place] [address]              Dear Aiden,     I am writing to report that your included test results are as expected.    Many labs contain some results that are slightly outside of the normal range, I have reviewed any of these results and they require no changes at this time     Resulted Orders   Lipid Profile ([facility])   Result Value Ref Range    Cholesterol 157 100 - 199 mg/dL    HDL 41 >=40 mg/dL    Triglycerides 54 0 - 149 mg/dL    LDL CALCULATED ([facility]) 105 0 - 130 mg/dL    Patient Fasting? Yes    CBC with Diff/Plt ([facility])   Result Value Ref Range    WBC x10/cmm 5.1 3.8 - 11.0 x10/cmm    % Lymphocytes 28.9 20.5 - 51.1 %    % Monocytes 8.0 1.7 - 9.3 %    % Granulocytes 63.1 42.2 - 75.2 %    RBC x10/cmm 5.04 4.2 - 5.9 x10/cmm    Hemoglobin 15.0 13.4 - 17.5 g/dl    Hematocrit 43.8 39 - 51 %    MCV 87.0 80 - 100 fL    MCH 29.8 27.0 - 31.0 pg    MCHC 34.2 33.0 - 37.0 g/dL    Platelet Count 289 140 - 450 K/uL   Comprehensive metabolic panel   Result Value Ref Range    Sodium 139 135 - 145 mmol/L    Potassium 4.4 3.4 - 5.3 mmol/L    Carbon Dioxide (CO2) 27 22 - 29 mmol/L    Anion Gap 11 7 - 15 mmol/L    Urea Nitrogen 18.9 8.0 - 23.0 mg/dL    Creatinine 1.04 0.67 - 1.17 mg/dL    GFR Estimate 77 >60 mL/min/1.73m2      Comment:      eGFR calculated using 2021 CKD-EPI equation.    Calcium 9.9 8.8 - 10.4 mg/dL    Chloride 101 98 - 107 mmol/L    Glucose 102 (H) 70 - 99 mg/dL    Alkaline Phosphatase 47 40 - 150 U/L    AST 17 0 - 45 U/L    ALT 8 0 - 70 U/L    Protein Total 7.4 6.4 - 8.3 g/dL    Albumin 4.2 3.5 - 5.2 g/dL    Bilirubin Total 0.3 <=1.2 mg/dL    Patient Fasting > 8hrs? Yes    Prostate Specific Antigen Screen   Result Value Ref Range    Prostate Specific Antigen Screen 3.89 0.00 - 6.50 ng/mL    Narrative    This result is obtained using the Roche Elecsys total PSA method on the axel e801 immunoassay analyzer, which is an ultrasensitive method.  Results obtained with different assay methods or kits cannot be used interchangeably.  This test is intended for initial prostate cancer screening. PSA values exceeding the age-specific limits are suspicious for prostate disease, but additional testing, such as prostate biopsy, is needed to diagnose prostate pathology. The American Cancer Society recommends annual examination with digital rectal examination and serum PSA beginning at age 50 and for men with a life expectancy of at least 10 years after detection of prostate cancer. For men in high-risk groups, such as  Americans or men with a first-degree relative diagnosed at a younger age, testing should begin at a younger age. It is generally recommended that information be provided to patients about the benefits and limitations of testing and treatment so they can make informed decisions.       If you have any questions or concerns, please call the clinic at the number listed above.       Sincerely,      Jarret Tay MD    Electronically signed

## 2025-03-11 RX ORDER — KETOCONAZOLE 20 MG/ML
SHAMPOO, SUSPENSION TOPICAL
Qty: 120 ML | Refills: 3 | Status: SHIPPED | OUTPATIENT
Start: 2025-03-11

## 2025-03-20 ENCOUNTER — TRANSFERRED RECORDS (OUTPATIENT)
Dept: FAMILY MEDICINE | Facility: CLINIC | Age: 70
End: 2025-03-20

## 2025-04-01 ENCOUNTER — TRANSFERRED RECORDS (OUTPATIENT)
Dept: FAMILY MEDICINE | Facility: CLINIC | Age: 70
End: 2025-04-01

## 2025-04-09 ENCOUNTER — OFFICE VISIT (OUTPATIENT)
Dept: FAMILY MEDICINE | Facility: CLINIC | Age: 70
End: 2025-04-09

## 2025-04-09 VITALS
SYSTOLIC BLOOD PRESSURE: 136 MMHG | BODY MASS INDEX: 24.22 KG/M2 | DIASTOLIC BLOOD PRESSURE: 82 MMHG | OXYGEN SATURATION: 98 % | WEIGHT: 149.6 LBS | HEART RATE: 75 BPM

## 2025-04-09 DIAGNOSIS — F32.2 CURRENT SEVERE EPISODE OF MAJOR DEPRESSIVE DISORDER WITHOUT PSYCHOTIC FEATURES WITHOUT PRIOR EPISODE (H): Primary | ICD-10-CM

## 2025-04-09 PROCEDURE — 3079F DIAST BP 80-89 MM HG: CPT | Performed by: FAMILY MEDICINE

## 2025-04-09 PROCEDURE — 3075F SYST BP GE 130 - 139MM HG: CPT | Performed by: FAMILY MEDICINE

## 2025-04-09 PROCEDURE — 99213 OFFICE O/P EST LOW 20 MIN: CPT | Performed by: FAMILY MEDICINE

## 2025-04-09 PROCEDURE — G2211 COMPLEX E/M VISIT ADD ON: HCPCS | Performed by: FAMILY MEDICINE

## 2025-04-09 ASSESSMENT — ANXIETY QUESTIONNAIRES
GAD7 TOTAL SCORE: 0
1. FEELING NERVOUS, ANXIOUS, OR ON EDGE: NOT AT ALL
3. WORRYING TOO MUCH ABOUT DIFFERENT THINGS: NOT AT ALL
2. NOT BEING ABLE TO STOP OR CONTROL WORRYING: NOT AT ALL
GAD7 TOTAL SCORE: 0
5. BEING SO RESTLESS THAT IT IS HARD TO SIT STILL: NOT AT ALL
7. FEELING AFRAID AS IF SOMETHING AWFUL MIGHT HAPPEN: NOT AT ALL
IF YOU CHECKED OFF ANY PROBLEMS ON THIS QUESTIONNAIRE, HOW DIFFICULT HAVE THESE PROBLEMS MADE IT FOR YOU TO DO YOUR WORK, TAKE CARE OF THINGS AT HOME, OR GET ALONG WITH OTHER PEOPLE: NOT DIFFICULT AT ALL
6. BECOMING EASILY ANNOYED OR IRRITABLE: NOT AT ALL

## 2025-04-09 ASSESSMENT — PATIENT HEALTH QUESTIONNAIRE - PHQ9
SUM OF ALL RESPONSES TO PHQ QUESTIONS 1-9: 0
5. POOR APPETITE OR OVEREATING: NOT AT ALL

## 2025-04-09 NOTE — PROGRESS NOTES
Depression:  Has known history of depression.  Has been on medication for this.  The patient does not report any significant side effects of this medication.  The prior symptoms leading to the original diagnosis and decision to start medication therapy are better.     Appetite is stable.  Sleeping patterns are stable.  No reported thoughts of suicide or homicide.    Last 3 PHQ9 results:      3/10/2025     8:01 AM 4/9/2025     8:02 AM   PHQ-9 SCORE   PHQ-9 Total Score MyChart 16 (Moderately severe depression)    PHQ-9 Total Score 16  0       Patient-reported     ROS:  General and Resp. completed and negative except as noted above    Histories: reviewed    OBJECTIVE:                                                    /82   Pulse 75   Wt 67.9 kg (149 lb 9.6 oz)   SpO2 98%   BMI 24.22 kg/m    Body mass index is 24.22 kg/m .   APPEARANCE: = Relaxed and in no distress  Heart Rate, Rhythm, & sounds (no Murm)  = Regular rate and rhythm with no S3, S4, or murmur appreciated.  Recent/Remote Memory = Alert and Oriented x 3  NEURO = CN II-XII are tested and no deficits found  Mood/Affect = Cooperative and interested     ASSESSMENT/PLAN:                                                    Quality gaps reviewed    Aiden was seen today for recheck medication.    Diagnoses and all orders for this visit:    Current severe episode of major depressive disorder without psychotic features without prior episode (H)    Tolerating Depression medication with a good response to this treatment.  PHQ-9 score:        4/9/2025     8:02 AM   PHQ   PHQ-9 Total Score 0   Q9: Thoughts of better off dead/self-harm past 2 weeks Not at all    today and we agreed that we will continue current treatment. We discussed ongoing management of his medications and how we will refill the medications now and in the future.   Next scheduled follow up in 3 months.  We breifly reviewed any questions he had about mood disorders including suspected  pathophysiology, role of neurotransmitters, and treatment options including medication options ( especially SSRIs that treat cause of sx) and counselling.    Has cut exercise down to an hour a day.  Work on weight loss    Health maintenance items are reviewed in Epic and correct as of today:    Jarret Tay MD  [facility]  [phone]    For any issues my office # is [phone]    The longitudinal plan of care for the diagnosis(es)/condition(s) as documented were addressed during this visit. Due to the added complexity in care, I will continue to support Aiden in the subsequent management and with ongoing continuity of care.

## 2025-04-11 ENCOUNTER — TRANSFERRED RECORDS (OUTPATIENT)
Dept: FAMILY MEDICINE | Facility: CLINIC | Age: 70
End: 2025-04-11

## 2025-07-10 ENCOUNTER — OFFICE VISIT (OUTPATIENT)
Dept: FAMILY MEDICINE | Facility: CLINIC | Age: 70
End: 2025-07-10

## 2025-07-10 VITALS
WEIGHT: 160 LBS | OXYGEN SATURATION: 96 % | SYSTOLIC BLOOD PRESSURE: 146 MMHG | BODY MASS INDEX: 25.9 KG/M2 | DIASTOLIC BLOOD PRESSURE: 75 MMHG | HEART RATE: 80 BPM

## 2025-07-10 DIAGNOSIS — F32.1 CURRENT MODERATE EPISODE OF MAJOR DEPRESSIVE DISORDER WITHOUT PRIOR EPISODE (H): ICD-10-CM

## 2025-07-10 DIAGNOSIS — C61 PROSTATE CANCER (H): Primary | ICD-10-CM

## 2025-07-10 DIAGNOSIS — I10 ESSENTIAL HYPERTENSION: ICD-10-CM

## 2025-07-10 RX ORDER — TADALAFIL 20 MG/1
20 TABLET ORAL EVERY 24 HOURS
Qty: 30 TABLET | Refills: 3 | Status: SHIPPED | OUTPATIENT
Start: 2025-07-10

## 2025-07-10 RX ORDER — CETIRIZINE HYDROCHLORIDE 10 MG/1
10 TABLET ORAL DAILY
COMMUNITY

## 2025-07-10 RX ORDER — FAMOTIDINE 20 MG
1000 TABLET ORAL DAILY
COMMUNITY

## 2025-07-10 NOTE — PROGRESS NOTES
Here to follow up on below issues.  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    Diagnoses and all orders for this visit:    Prostate cancer (H) Actively managed by Dr. Box at [facility]  -     tadalafil (ADCIRCA/CIALIS) 20 MG tablet; Take 1 tablet (20 mg) by mouth every 24 hours.    Essential hypertension  Reviewed his current HTN management. Discussed our goal for him is a systolic pressure at or below 128 and diastolic pressure at or below 83.  We today managed his prescriptions with refills ensured to ensure availabilty of current medications.  Discussed the importance for aggressive management of HTN to prevent vascular complications later.  Recommended lower fat, lower carbohydrate, and lower sodium (<2000 mg)diet. Required intervals for follow up on HTN, lab studies reviewed.    Strongly recommened he follow his blood pressures outside the clinic to ensure that BPs are remaining within guidelines,.  Instructed to contact me if the readings are not within guidelines on a regular basis so we can adjust treatment as needed.    Discussed erectile dysfunction in detail including a review of the physiology that produces erections.  Reviewed typical causes of impotence such as medication side effect, diabetes, neuropathies, drugs/alcohol, underlying mood disorder, relationship issues, neurogenic causes, hypogonadism.  Discussed typical medical evaluation including physical exam findings, labs (thyroid, testosterone, BMP, etc.)  Treatment will include modifying any causative features if able, stopping nay substance use, treating low testosterone levels, and consideration of medications  (Viagra/Levitra/Cialis).     Discussed the risks and benefits of these meds.  Discussed the potential side effects of Viagra/Levitra/Cialis including dizziness, headache, vision changes, syncope, GI upset/dyspepsia, hypotension.  Recommended avoiding taking with large meal to improve absorption.  Avoid taking with alcohol.  Never take nitroglycerin containing compounds when on these medications.    WIll start with a lower dose and titrate upward as needed, aiming for the lowest effective dose.  If effective, they will call for a prescription provided the medication if effective and there are no side effects.     -     tadalafil (ADCIRCA/CIALIS) 20 MG tablet; Take 1 tablet (20 mg) by mouth every 24 hours.    Current moderate episode of major depressive disorder without prior episode (H)  -     DEPRESSION ACTION PLAN (DAP)      Jarret Tay MD   [facility]  [phone]              Answers submitted by the patient for this visit:  General Questionnaire (Submitted on 7/10/2025)  Chief Complaint: Chronic problems general questions HPI Form  What is the reason for your visit today? : follow up  How many days per week do you miss taking your medication?: 1  Questionnaire about: Chronic problems general questions HPI Form (Submitted on 7/10/2025)  Chief Complaint: Chronic problems general questions HPI Form

## 2025-09-03 ENCOUNTER — OFFICE VISIT (OUTPATIENT)
Age: 70
End: 2025-09-03

## 2025-09-03 VITALS
HEART RATE: 82 BPM | OXYGEN SATURATION: 99 % | DIASTOLIC BLOOD PRESSURE: 92 MMHG | WEIGHT: 167.4 LBS | SYSTOLIC BLOOD PRESSURE: 144 MMHG | BODY MASS INDEX: 27.1 KG/M2

## 2025-09-03 DIAGNOSIS — Z23 NEED FOR VACCINATION: ICD-10-CM

## 2025-09-03 DIAGNOSIS — S90.211A CONTUSION OF RIGHT GREAT TOE WITH DAMAGE TO NAIL, INITIAL ENCOUNTER: Primary | ICD-10-CM

## 2025-09-03 DIAGNOSIS — I10 ESSENTIAL HYPERTENSION: ICD-10-CM

## 2025-09-03 PROCEDURE — 3080F DIAST BP >= 90 MM HG: CPT | Performed by: FAMILY MEDICINE

## 2025-09-03 PROCEDURE — G0008 ADMIN INFLUENZA VIRUS VAC: HCPCS | Performed by: FAMILY MEDICINE

## 2025-09-03 PROCEDURE — 99213 OFFICE O/P EST LOW 20 MIN: CPT | Mod: 25 | Performed by: FAMILY MEDICINE

## 2025-09-03 PROCEDURE — 91320 SARSCV2 VAC 30MCG TRS-SUC IM: CPT | Performed by: FAMILY MEDICINE

## 2025-09-03 PROCEDURE — 90480 ADMN SARSCOV2 VAC 1/ONLY CMP: CPT | Performed by: FAMILY MEDICINE

## 2025-09-03 PROCEDURE — 3077F SYST BP >= 140 MM HG: CPT | Performed by: FAMILY MEDICINE

## 2025-09-03 PROCEDURE — 90653 IIV ADJUVANT VACCINE IM: CPT | Performed by: FAMILY MEDICINE

## 2025-09-03 RX ORDER — DESONIDE 0.5 MG/G
CREAM TOPICAL
COMMUNITY

## 2025-09-03 ASSESSMENT — PATIENT HEALTH QUESTIONNAIRE - PHQ9
SUM OF ALL RESPONSES TO PHQ QUESTIONS 1-9: 0
SUM OF ALL RESPONSES TO PHQ QUESTIONS 1-9: 0
10. IF YOU CHECKED OFF ANY PROBLEMS, HOW DIFFICULT HAVE THESE PROBLEMS MADE IT FOR YOU TO DO YOUR WORK, TAKE CARE OF THINGS AT HOME, OR GET ALONG WITH OTHER PEOPLE: NOT DIFFICULT AT ALL